# Patient Record
Sex: FEMALE | Race: WHITE | NOT HISPANIC OR LATINO
[De-identification: names, ages, dates, MRNs, and addresses within clinical notes are randomized per-mention and may not be internally consistent; named-entity substitution may affect disease eponyms.]

---

## 2018-12-20 ENCOUNTER — LABORATORY RESULT (OUTPATIENT)
Age: 28
End: 2018-12-20

## 2018-12-27 PROBLEM — E55.9 VITAMIN D INSUFFICIENCY: Status: ACTIVE | Noted: 2018-12-27

## 2018-12-27 PROBLEM — E03.9 SUBCLINICAL HYPOTHYROIDISM: Status: ACTIVE | Noted: 2018-12-27

## 2018-12-28 ENCOUNTER — APPOINTMENT (OUTPATIENT)
Dept: INTERNAL MEDICINE | Facility: CLINIC | Age: 28
End: 2018-12-28
Payer: COMMERCIAL

## 2018-12-28 VITALS
WEIGHT: 191 LBS | SYSTOLIC BLOOD PRESSURE: 110 MMHG | BODY MASS INDEX: 30.7 KG/M2 | OXYGEN SATURATION: 98 % | HEART RATE: 79 BPM | TEMPERATURE: 98.3 F | DIASTOLIC BLOOD PRESSURE: 80 MMHG | HEIGHT: 66.14 IN

## 2018-12-28 DIAGNOSIS — E03.9 HYPOTHYROIDISM, UNSPECIFIED: ICD-10-CM

## 2018-12-28 DIAGNOSIS — E55.9 VITAMIN D DEFICIENCY, UNSPECIFIED: ICD-10-CM

## 2018-12-28 DIAGNOSIS — Z00.00 ENCOUNTER FOR GENERAL ADULT MEDICAL EXAMINATION W/OUT ABNORMAL FINDINGS: ICD-10-CM

## 2018-12-28 DIAGNOSIS — B00.1 HERPESVIRAL VESICULAR DERMATITIS: ICD-10-CM

## 2018-12-28 PROCEDURE — 99385 PREV VISIT NEW AGE 18-39: CPT

## 2018-12-28 PROCEDURE — 99214 OFFICE O/P EST MOD 30 MIN: CPT | Mod: 25

## 2018-12-31 PROBLEM — Z00.00 ENCOUNTER FOR PREVENTIVE HEALTH EXAMINATION: Noted: 2018-10-19

## 2019-01-09 PROBLEM — Z00.00 ENCOUNTER FOR PREVENTIVE HEALTH EXAMINATION: Status: ACTIVE | Noted: 2019-01-09

## 2019-01-22 ENCOUNTER — APPOINTMENT (OUTPATIENT)
Dept: INTERNAL MEDICINE | Facility: CLINIC | Age: 29
End: 2019-01-22

## 2019-08-16 ENCOUNTER — CLINICAL ADVICE (OUTPATIENT)
Age: 29
End: 2019-08-16

## 2019-10-25 ENCOUNTER — RESULT REVIEW (OUTPATIENT)
Age: 29
End: 2019-10-25

## 2019-12-16 ENCOUNTER — MEDICATION RENEWAL (OUTPATIENT)
Age: 29
End: 2019-12-16

## 2019-12-20 ENCOUNTER — MEDICATION RENEWAL (OUTPATIENT)
Age: 29
End: 2019-12-20

## 2020-02-12 ENCOUNTER — TRANSCRIPTION ENCOUNTER (OUTPATIENT)
Age: 30
End: 2020-02-12

## 2020-04-10 ENCOUNTER — APPOINTMENT (OUTPATIENT)
Dept: ANTEPARTUM | Facility: CLINIC | Age: 30
End: 2020-04-10
Payer: COMMERCIAL

## 2020-04-10 ENCOUNTER — ASOB RESULT (OUTPATIENT)
Age: 30
End: 2020-04-10

## 2020-04-10 PROCEDURE — 76811 OB US DETAILED SNGL FETUS: CPT

## 2020-07-07 ENCOUNTER — TRANSCRIPTION ENCOUNTER (OUTPATIENT)
Age: 30
End: 2020-07-07

## 2020-07-08 ENCOUNTER — TRANSCRIPTION ENCOUNTER (OUTPATIENT)
Age: 30
End: 2020-07-08

## 2020-08-23 ENCOUNTER — INPATIENT (INPATIENT)
Facility: HOSPITAL | Age: 30
LOS: 0 days | Discharge: HOME | End: 2020-08-24
Attending: OBSTETRICS & GYNECOLOGY | Admitting: OBSTETRICS & GYNECOLOGY
Payer: COMMERCIAL

## 2020-08-23 VITALS — OXYGEN SATURATION: 99 %

## 2020-08-23 DIAGNOSIS — Z3A.00 WEEKS OF GESTATION OF PREGNANCY NOT SPECIFIED: ICD-10-CM

## 2020-08-23 DIAGNOSIS — O26.899 OTHER SPECIFIED PREGNANCY RELATED CONDITIONS, UNSPECIFIED TRIMESTER: ICD-10-CM

## 2020-08-23 DIAGNOSIS — Z34.80 ENCOUNTER FOR SUPERVISION OF OTHER NORMAL PREGNANCY, UNSPECIFIED TRIMESTER: ICD-10-CM

## 2020-08-23 LAB
BASOPHILS # BLD AUTO: 0.04 K/UL — SIGNIFICANT CHANGE UP (ref 0–0.2)
BASOPHILS NFR BLD AUTO: 0.3 % — SIGNIFICANT CHANGE UP (ref 0–2)
BLD GP AB SCN SERPL QL: NEGATIVE — SIGNIFICANT CHANGE UP
EOSINOPHIL # BLD AUTO: 0.15 K/UL — SIGNIFICANT CHANGE UP (ref 0–0.5)
EOSINOPHIL NFR BLD AUTO: 1.2 % — SIGNIFICANT CHANGE UP (ref 0–6)
HCT VFR BLD CALC: 39.4 % — SIGNIFICANT CHANGE UP (ref 34.5–45)
HGB BLD-MCNC: 13.3 G/DL — SIGNIFICANT CHANGE UP (ref 11.5–15.5)
IMM GRANULOCYTES NFR BLD AUTO: 0.6 % — SIGNIFICANT CHANGE UP (ref 0–1.5)
LYMPHOCYTES # BLD AUTO: 17.6 % — SIGNIFICANT CHANGE UP (ref 13–44)
LYMPHOCYTES # BLD AUTO: 2.14 K/UL — SIGNIFICANT CHANGE UP (ref 1–3.3)
MCHC RBC-ENTMCNC: 30.6 PG — SIGNIFICANT CHANGE UP (ref 27–34)
MCHC RBC-ENTMCNC: 33.8 GM/DL — SIGNIFICANT CHANGE UP (ref 32–36)
MCV RBC AUTO: 90.8 FL — SIGNIFICANT CHANGE UP (ref 80–100)
MONOCYTES # BLD AUTO: 0.74 K/UL — SIGNIFICANT CHANGE UP (ref 0–0.9)
MONOCYTES NFR BLD AUTO: 6.1 % — SIGNIFICANT CHANGE UP (ref 2–14)
NEUTROPHILS # BLD AUTO: 9.04 K/UL — HIGH (ref 1.8–7.4)
NEUTROPHILS NFR BLD AUTO: 74.2 % — SIGNIFICANT CHANGE UP (ref 43–77)
NRBC # BLD: 0 /100 WBCS — SIGNIFICANT CHANGE UP (ref 0–0)
PLATELET # BLD AUTO: 222 K/UL — SIGNIFICANT CHANGE UP (ref 150–400)
RBC # BLD: 4.34 M/UL — SIGNIFICANT CHANGE UP (ref 3.8–5.2)
RBC # FLD: 12.8 % — SIGNIFICANT CHANGE UP (ref 10.3–14.5)
RH IG SCN BLD-IMP: POSITIVE — SIGNIFICANT CHANGE UP
SARS-COV-2 IGG SERPL QL IA: NEGATIVE — SIGNIFICANT CHANGE UP
SARS-COV-2 IGM SERPL IA-ACNC: 0.09 INDEX — SIGNIFICANT CHANGE UP
SARS-COV-2 RNA SPEC QL NAA+PROBE: SIGNIFICANT CHANGE UP
WBC # BLD: 12.18 K/UL — HIGH (ref 3.8–10.5)
WBC # FLD AUTO: 12.18 K/UL — HIGH (ref 3.8–10.5)

## 2020-08-23 RX ORDER — OXYTOCIN 10 UNIT/ML
2 VIAL (ML) INJECTION
Qty: 30 | Refills: 0 | Status: DISCONTINUED | OUTPATIENT
Start: 2020-08-23 | End: 2020-08-24

## 2020-08-23 RX ORDER — SODIUM CHLORIDE 9 MG/ML
1000 INJECTION, SOLUTION INTRAVENOUS
Refills: 0 | Status: DISCONTINUED | OUTPATIENT
Start: 2020-08-23 | End: 2020-08-23

## 2020-08-23 RX ORDER — OXYTOCIN 10 UNIT/ML
10 VIAL (ML) INJECTION ONCE
Refills: 0 | Status: COMPLETED | OUTPATIENT
Start: 2020-08-23 | End: 2020-08-23

## 2020-08-23 RX ORDER — CITRIC ACID/SODIUM CITRATE 300-500 MG
15 SOLUTION, ORAL ORAL EVERY 6 HOURS
Refills: 0 | Status: DISCONTINUED | OUTPATIENT
Start: 2020-08-23 | End: 2020-08-23

## 2020-08-23 RX ORDER — OXYCODONE HYDROCHLORIDE 5 MG/1
5 TABLET ORAL
Refills: 0 | Status: DISCONTINUED | OUTPATIENT
Start: 2020-08-23 | End: 2020-08-24

## 2020-08-23 RX ORDER — IBUPROFEN 200 MG
600 TABLET ORAL EVERY 6 HOURS
Refills: 0 | Status: COMPLETED | OUTPATIENT
Start: 2020-08-23 | End: 2021-07-22

## 2020-08-23 RX ORDER — OXYTOCIN 10 UNIT/ML
333.33 VIAL (ML) INJECTION
Qty: 20 | Refills: 0 | Status: DISCONTINUED | OUTPATIENT
Start: 2020-08-23 | End: 2020-08-24

## 2020-08-23 RX ORDER — SODIUM CHLORIDE 9 MG/ML
1000 INJECTION, SOLUTION INTRAVENOUS
Refills: 0 | Status: DISCONTINUED | OUTPATIENT
Start: 2020-08-23 | End: 2020-08-24

## 2020-08-23 RX ORDER — OXYTOCIN 10 UNIT/ML
2 VIAL (ML) INJECTION
Qty: 30 | Refills: 0 | Status: DISCONTINUED | OUTPATIENT
Start: 2020-08-23 | End: 2020-08-23

## 2020-08-23 RX ORDER — IBUPROFEN 200 MG
600 TABLET ORAL EVERY 6 HOURS
Refills: 0 | Status: DISCONTINUED | OUTPATIENT
Start: 2020-08-23 | End: 2020-08-24

## 2020-08-23 RX ORDER — SODIUM CHLORIDE 9 MG/ML
1000 INJECTION, SOLUTION INTRAVENOUS ONCE
Refills: 0 | Status: COMPLETED | OUTPATIENT
Start: 2020-08-23 | End: 2020-08-23

## 2020-08-23 RX ORDER — ACETAMINOPHEN 500 MG
975 TABLET ORAL
Refills: 0 | Status: DISCONTINUED | OUTPATIENT
Start: 2020-08-23 | End: 2020-08-24

## 2020-08-23 RX ORDER — OXYCODONE HYDROCHLORIDE 5 MG/1
5 TABLET ORAL ONCE
Refills: 0 | Status: DISCONTINUED | OUTPATIENT
Start: 2020-08-23 | End: 2020-08-24

## 2020-08-23 RX ADMIN — Medication 15 MILLILITER(S): at 12:25

## 2020-08-23 RX ADMIN — SODIUM CHLORIDE 125 MILLILITER(S): 9 INJECTION, SOLUTION INTRAVENOUS at 11:49

## 2020-08-23 RX ADMIN — SODIUM CHLORIDE 2000 MILLILITER(S): 9 INJECTION, SOLUTION INTRAVENOUS at 08:19

## 2020-08-23 RX ADMIN — Medication 975 MILLIGRAM(S): at 23:55

## 2020-08-23 RX ADMIN — SODIUM CHLORIDE 125 MILLILITER(S): 9 INJECTION, SOLUTION INTRAVENOUS at 08:19

## 2020-08-23 RX ADMIN — Medication 10 UNIT(S): at 18:01

## 2020-08-23 RX ADMIN — SODIUM CHLORIDE 125 MILLILITER(S): 9 INJECTION, SOLUTION INTRAVENOUS at 08:46

## 2020-08-23 RX ADMIN — Medication 2 MILLIUNIT(S)/MIN: at 11:46

## 2020-08-23 RX ADMIN — Medication 600 MILLIGRAM(S): at 20:05

## 2020-08-24 ENCOUNTER — TRANSCRIPTION ENCOUNTER (OUTPATIENT)
Age: 30
End: 2020-08-24

## 2020-08-24 VITALS
DIASTOLIC BLOOD PRESSURE: 68 MMHG | SYSTOLIC BLOOD PRESSURE: 109 MMHG | TEMPERATURE: 98 F | OXYGEN SATURATION: 66 % | RESPIRATION RATE: 18 BRPM | HEART RATE: 66 BPM

## 2020-08-24 LAB
T PALLIDUM AB TITR SER: NEGATIVE — SIGNIFICANT CHANGE UP

## 2020-08-24 PROCEDURE — 86901 BLOOD TYPING SEROLOGIC RH(D): CPT

## 2020-08-24 PROCEDURE — 59050 FETAL MONITOR W/REPORT: CPT

## 2020-08-24 PROCEDURE — 86769 SARS-COV-2 COVID-19 ANTIBODY: CPT

## 2020-08-24 PROCEDURE — 86900 BLOOD TYPING SEROLOGIC ABO: CPT

## 2020-08-24 PROCEDURE — 86850 RBC ANTIBODY SCREEN: CPT

## 2020-08-24 PROCEDURE — 59025 FETAL NON-STRESS TEST: CPT

## 2020-08-24 PROCEDURE — G0463: CPT

## 2020-08-24 PROCEDURE — 85027 COMPLETE CBC AUTOMATED: CPT

## 2020-08-24 PROCEDURE — 86780 TREPONEMA PALLIDUM: CPT

## 2020-08-24 RX ORDER — OXYTOCIN 10 UNIT/ML
333.33 VIAL (ML) INJECTION
Qty: 20 | Refills: 0 | Status: DISCONTINUED | OUTPATIENT
Start: 2020-08-24 | End: 2020-08-24

## 2020-08-24 RX ORDER — SODIUM CHLORIDE 9 MG/ML
1000 INJECTION, SOLUTION INTRAVENOUS
Refills: 0 | Status: DISCONTINUED | OUTPATIENT
Start: 2020-08-24 | End: 2020-08-24

## 2020-08-24 RX ORDER — SIMETHICONE 80 MG/1
80 TABLET, CHEWABLE ORAL EVERY 4 HOURS
Refills: 0 | Status: DISCONTINUED | OUTPATIENT
Start: 2020-08-24 | End: 2020-08-24

## 2020-08-24 RX ORDER — TETANUS TOXOID, REDUCED DIPHTHERIA TOXOID AND ACELLULAR PERTUSSIS VACCINE, ADSORBED 5; 2.5; 8; 8; 2.5 [IU]/.5ML; [IU]/.5ML; UG/.5ML; UG/.5ML; UG/.5ML
0.5 SUSPENSION INTRAMUSCULAR ONCE
Refills: 0 | Status: DISCONTINUED | OUTPATIENT
Start: 2020-08-24 | End: 2020-08-24

## 2020-08-24 RX ORDER — IBUPROFEN 200 MG
1 TABLET ORAL
Qty: 0 | Refills: 0 | DISCHARGE
Start: 2020-08-24

## 2020-08-24 RX ORDER — LANOLIN
1 OINTMENT (GRAM) TOPICAL EVERY 6 HOURS
Refills: 0 | Status: DISCONTINUED | OUTPATIENT
Start: 2020-08-24 | End: 2020-08-24

## 2020-08-24 RX ORDER — SENNA PLUS 8.6 MG/1
1 TABLET ORAL ONCE
Refills: 0 | Status: COMPLETED | OUTPATIENT
Start: 2020-08-24 | End: 2020-08-24

## 2020-08-24 RX ORDER — ACETAMINOPHEN 500 MG
3 TABLET ORAL
Qty: 0 | Refills: 0 | DISCHARGE
Start: 2020-08-24

## 2020-08-24 RX ORDER — MAGNESIUM HYDROXIDE 400 MG/1
30 TABLET, CHEWABLE ORAL
Refills: 0 | Status: DISCONTINUED | OUTPATIENT
Start: 2020-08-24 | End: 2020-08-24

## 2020-08-24 RX ORDER — DIPHENHYDRAMINE HCL 50 MG
25 CAPSULE ORAL EVERY 6 HOURS
Refills: 0 | Status: DISCONTINUED | OUTPATIENT
Start: 2020-08-24 | End: 2020-08-24

## 2020-08-24 RX ADMIN — Medication 975 MILLIGRAM(S): at 12:29

## 2020-08-24 RX ADMIN — Medication 975 MILLIGRAM(S): at 06:45

## 2020-08-24 RX ADMIN — Medication 975 MILLIGRAM(S): at 13:00

## 2020-08-24 RX ADMIN — Medication 975 MILLIGRAM(S): at 00:30

## 2020-08-24 RX ADMIN — Medication 600 MILLIGRAM(S): at 09:17

## 2020-08-24 RX ADMIN — Medication 600 MILLIGRAM(S): at 09:50

## 2020-08-24 RX ADMIN — Medication 600 MILLIGRAM(S): at 03:55

## 2020-08-24 RX ADMIN — Medication 975 MILLIGRAM(S): at 17:20

## 2020-08-24 RX ADMIN — Medication 975 MILLIGRAM(S): at 06:12

## 2020-08-24 RX ADMIN — Medication 600 MILLIGRAM(S): at 16:00

## 2020-08-24 RX ADMIN — Medication 600 MILLIGRAM(S): at 03:15

## 2020-08-24 RX ADMIN — Medication 600 MILLIGRAM(S): at 15:28

## 2020-08-24 RX ADMIN — SENNA PLUS 1 TABLET(S): 8.6 TABLET ORAL at 17:20

## 2020-08-24 NOTE — DISCHARGE NOTE OB - CARE PLAN
Principal Discharge DX:	 (normal spontaneous vaginal delivery)  Goal:	D/C HOME  Assessment and plan of treatment:	PPV 6 WEEKS

## 2020-08-24 NOTE — DISCHARGE NOTE OB - CARE PROVIDER_API CALL
Lenny Rowan  OBSTETRICS AND GYNECOLOGY  [address], [address]  [place] [address]  Phone: [phone]  Fax: [phone]  Follow Up Time:

## 2020-08-24 NOTE — DISCHARGE NOTE OB - PATIENT PORTAL LINK FT
You can access the FollowMyHealth Patient Portal offered by [facility] by registering at the following website: [email or web address]. By joining [employer]’s FollowMyHealth portal, you will also be able to view your health information using other applications (apps) compatible with our system.

## 2020-08-24 NOTE — PROGRESS NOTE ADULT - SUBJECTIVE AND OBJECTIVE BOX
PA Postpartum Note- PPD#1    Prenatal Labs  Blood type: O Positive  Rubella IgG:   RPR: Negative        S:Patient w/o complaints, pain is controlled.    Pt is OOB, tolerating PO, voiding. Lochia WNL.     O:  Vital Signs Last 24 Hrs  T(C): 36.7 (24 Aug 2020 06:09), Max: 37.1 (23 Aug 2020 20:32)  T(F): 98.1 (24 Aug 2020 06:09), Max: 98.8 (23 Aug 2020 20:32)  HR: 78 (24 Aug 2020 06:09) (72 - 96)  BP: 97/61 (24 Aug 2020 06:09) (97/61 - 118/66)  BP(mean): --  RR: 18 (24 Aug 2020 06:09) (18 - 18)  SpO2: 98% (24 Aug 2020 06:09) (96% - 99%)     Gen: NAD  Abdomen: Soft, nontender, non-distended, fundus firm.  Vaginal: Lochia WNL,    Ext: Neg edema, Neg calf tenderness    LABS:    Hemoglobin: 13.3 g/dL (08-23 @ 09:03)      Hematocrit: 39.4 % (08-23 @ 09:03)

## 2020-09-29 ENCOUNTER — RESULT REVIEW (OUTPATIENT)
Age: 30
End: 2020-09-29

## 2020-10-13 ENCOUNTER — TRANSCRIPTION ENCOUNTER (OUTPATIENT)
Age: 30
End: 2020-10-13

## 2021-04-27 ENCOUNTER — TRANSCRIPTION ENCOUNTER (OUTPATIENT)
Age: 31
End: 2021-04-27

## 2021-06-09 ENCOUNTER — NON-APPOINTMENT (OUTPATIENT)
Age: 31
End: 2021-06-09

## 2021-06-09 ENCOUNTER — APPOINTMENT (OUTPATIENT)
Dept: ORTHOPEDIC SURGERY | Facility: CLINIC | Age: 31
End: 2021-06-09
Payer: COMMERCIAL

## 2021-06-09 VITALS
HEART RATE: 83 BPM | DIASTOLIC BLOOD PRESSURE: 76 MMHG | HEIGHT: 65 IN | WEIGHT: 170 LBS | SYSTOLIC BLOOD PRESSURE: 109 MMHG | BODY MASS INDEX: 28.32 KG/M2

## 2021-06-09 DIAGNOSIS — Z82.49 FAMILY HISTORY OF ISCHEMIC HEART DISEASE AND OTHER DISEASES OF THE CIRCULATORY SYSTEM: ICD-10-CM

## 2021-06-09 DIAGNOSIS — Z83.3 FAMILY HISTORY OF DIABETES MELLITUS: ICD-10-CM

## 2021-06-09 DIAGNOSIS — Z80.9 FAMILY HISTORY OF MALIGNANT NEOPLASM, UNSPECIFIED: ICD-10-CM

## 2021-06-09 DIAGNOSIS — M65.4 RADIAL STYLOID TENOSYNOVITIS [DE QUERVAIN]: ICD-10-CM

## 2021-06-09 DIAGNOSIS — Z72.89 OTHER PROBLEMS RELATED TO LIFESTYLE: ICD-10-CM

## 2021-06-09 DIAGNOSIS — Z78.9 OTHER SPECIFIED HEALTH STATUS: ICD-10-CM

## 2021-06-09 PROCEDURE — 99072 ADDL SUPL MATRL&STAF TM PHE: CPT

## 2021-06-09 PROCEDURE — 73100 X-RAY EXAM OF WRIST: CPT | Mod: 50

## 2021-06-09 PROCEDURE — 99203 OFFICE O/P NEW LOW 30 MIN: CPT

## 2021-06-10 ENCOUNTER — NON-APPOINTMENT (OUTPATIENT)
Age: 31
End: 2021-06-10

## 2021-06-30 ENCOUNTER — APPOINTMENT (OUTPATIENT)
Dept: ORTHOPEDIC SURGERY | Facility: CLINIC | Age: 31
End: 2021-06-30

## 2021-07-24 ENCOUNTER — TRANSCRIPTION ENCOUNTER (OUTPATIENT)
Age: 31
End: 2021-07-24

## 2021-10-28 ENCOUNTER — RESULT REVIEW (OUTPATIENT)
Age: 31
End: 2021-10-28

## 2021-10-29 ENCOUNTER — OUTPATIENT (OUTPATIENT)
Dept: OUTPATIENT SERVICES | Facility: HOSPITAL | Age: 31
LOS: 1 days | End: 2021-10-29

## 2021-10-29 DIAGNOSIS — Z00.8 ENCOUNTER FOR OTHER GENERAL EXAMINATION: ICD-10-CM

## 2021-11-03 ENCOUNTER — APPOINTMENT (OUTPATIENT)
Dept: ULTRASOUND IMAGING | Facility: CLINIC | Age: 31
End: 2021-11-03
Payer: COMMERCIAL

## 2021-11-03 ENCOUNTER — APPOINTMENT (OUTPATIENT)
Dept: MAMMOGRAPHY | Facility: CLINIC | Age: 31
End: 2021-11-03

## 2021-11-03 ENCOUNTER — OUTPATIENT (OUTPATIENT)
Dept: OUTPATIENT SERVICES | Facility: HOSPITAL | Age: 31
LOS: 1 days | End: 2021-11-03
Payer: COMMERCIAL

## 2021-11-03 DIAGNOSIS — Z00.8 ENCOUNTER FOR OTHER GENERAL EXAMINATION: ICD-10-CM

## 2021-11-03 PROCEDURE — 77066 DX MAMMO INCL CAD BI: CPT

## 2021-11-03 PROCEDURE — 76641 ULTRASOUND BREAST COMPLETE: CPT

## 2021-11-03 PROCEDURE — 76641 ULTRASOUND BREAST COMPLETE: CPT | Mod: 26,50

## 2021-11-03 PROCEDURE — G0279: CPT | Mod: 26

## 2021-11-03 PROCEDURE — G0279: CPT

## 2021-11-03 PROCEDURE — 77066 DX MAMMO INCL CAD BI: CPT | Mod: 26

## 2021-11-21 ENCOUNTER — TRANSCRIPTION ENCOUNTER (OUTPATIENT)
Age: 31
End: 2021-11-21

## 2021-12-07 ENCOUNTER — NON-APPOINTMENT (OUTPATIENT)
Age: 31
End: 2021-12-07

## 2021-12-08 ENCOUNTER — NON-APPOINTMENT (OUTPATIENT)
Age: 31
End: 2021-12-08

## 2021-12-08 ENCOUNTER — APPOINTMENT (OUTPATIENT)
Dept: SURGICAL ONCOLOGY | Facility: CLINIC | Age: 31
End: 2021-12-08
Payer: COMMERCIAL

## 2021-12-08 VITALS
SYSTOLIC BLOOD PRESSURE: 115 MMHG | WEIGHT: 170 LBS | DIASTOLIC BLOOD PRESSURE: 80 MMHG | HEIGHT: 65 IN | OXYGEN SATURATION: 98 % | HEART RATE: 66 BPM | TEMPERATURE: 97 F | BODY MASS INDEX: 28.32 KG/M2 | RESPIRATION RATE: 15 BRPM

## 2021-12-08 PROCEDURE — 99204 OFFICE O/P NEW MOD 45 MIN: CPT

## 2022-01-19 ENCOUNTER — APPOINTMENT (OUTPATIENT)
Dept: SURGICAL ONCOLOGY | Facility: CLINIC | Age: 32
End: 2022-01-19

## 2022-05-13 ENCOUNTER — APPOINTMENT (OUTPATIENT)
Dept: ULTRASOUND IMAGING | Facility: CLINIC | Age: 32
End: 2022-05-13
Payer: COMMERCIAL

## 2022-05-13 ENCOUNTER — OUTPATIENT (OUTPATIENT)
Dept: OUTPATIENT SERVICES | Facility: HOSPITAL | Age: 32
LOS: 1 days | End: 2022-05-13
Payer: COMMERCIAL

## 2022-05-13 DIAGNOSIS — Z00.8 ENCOUNTER FOR OTHER GENERAL EXAMINATION: ICD-10-CM

## 2022-05-13 PROCEDURE — 76641 ULTRASOUND BREAST COMPLETE: CPT | Mod: 26,50

## 2022-05-13 PROCEDURE — 76641 ULTRASOUND BREAST COMPLETE: CPT

## 2022-05-14 ENCOUNTER — RESULT REVIEW (OUTPATIENT)
Age: 32
End: 2022-05-14

## 2022-05-14 ENCOUNTER — APPOINTMENT (OUTPATIENT)
Dept: ULTRASOUND IMAGING | Facility: CLINIC | Age: 32
End: 2022-05-14
Payer: COMMERCIAL

## 2022-05-14 ENCOUNTER — OUTPATIENT (OUTPATIENT)
Dept: OUTPATIENT SERVICES | Facility: HOSPITAL | Age: 32
LOS: 1 days | End: 2022-05-14
Payer: COMMERCIAL

## 2022-05-14 DIAGNOSIS — Z00.8 ENCOUNTER FOR OTHER GENERAL EXAMINATION: ICD-10-CM

## 2022-05-14 DIAGNOSIS — R92.2 INCONCLUSIVE MAMMOGRAM: ICD-10-CM

## 2022-05-14 PROCEDURE — 88360 TUMOR IMMUNOHISTOCHEM/MANUAL: CPT | Mod: 26

## 2022-05-14 PROCEDURE — 19084 BX BREAST ADD LESION US IMAG: CPT | Mod: RT

## 2022-05-14 PROCEDURE — 76942 ECHO GUIDE FOR BIOPSY: CPT | Mod: 26

## 2022-05-14 PROCEDURE — 88305 TISSUE EXAM BY PATHOLOGIST: CPT

## 2022-05-14 PROCEDURE — 38505 NEEDLE BIOPSY LYMPH NODES: CPT | Mod: RT

## 2022-05-14 PROCEDURE — 19083 BX BREAST 1ST LESION US IMAG: CPT | Mod: RT

## 2022-05-14 PROCEDURE — 88305 TISSUE EXAM BY PATHOLOGIST: CPT | Mod: 26

## 2022-05-14 PROCEDURE — A4648: CPT

## 2022-05-14 PROCEDURE — 88360 TUMOR IMMUNOHISTOCHEM/MANUAL: CPT

## 2022-05-14 PROCEDURE — 38505 NEEDLE BIOPSY LYMPH NODES: CPT

## 2022-05-14 PROCEDURE — 19084 BX BREAST ADD LESION US IMAG: CPT

## 2022-05-14 PROCEDURE — 76942 ECHO GUIDE FOR BIOPSY: CPT

## 2022-05-14 PROCEDURE — 19083 BX BREAST 1ST LESION US IMAG: CPT

## 2022-05-18 ENCOUNTER — APPOINTMENT (OUTPATIENT)
Dept: SURGICAL ONCOLOGY | Facility: CLINIC | Age: 32
End: 2022-05-18
Payer: COMMERCIAL

## 2022-05-18 VITALS
DIASTOLIC BLOOD PRESSURE: 77 MMHG | HEIGHT: 65 IN | BODY MASS INDEX: 31.65 KG/M2 | RESPIRATION RATE: 16 BRPM | HEART RATE: 90 BPM | TEMPERATURE: 97.7 F | SYSTOLIC BLOOD PRESSURE: 114 MMHG | OXYGEN SATURATION: 100 % | WEIGHT: 190 LBS

## 2022-05-18 PROCEDURE — 99215 OFFICE O/P EST HI 40 MIN: CPT

## 2022-05-19 ENCOUNTER — APPOINTMENT (OUTPATIENT)
Dept: ULTRASOUND IMAGING | Facility: CLINIC | Age: 32
End: 2022-05-19
Payer: COMMERCIAL

## 2022-05-19 ENCOUNTER — APPOINTMENT (OUTPATIENT)
Dept: RADIOLOGY | Facility: CLINIC | Age: 32
End: 2022-05-19
Payer: COMMERCIAL

## 2022-05-19 PROCEDURE — 71046 X-RAY EXAM CHEST 2 VIEWS: CPT

## 2022-05-19 PROCEDURE — 76700 US EXAM ABDOM COMPLETE: CPT

## 2022-05-23 ENCOUNTER — OUTPATIENT (OUTPATIENT)
Dept: OUTPATIENT SERVICES | Facility: HOSPITAL | Age: 32
LOS: 1 days | Discharge: HOME | End: 2022-05-23

## 2022-05-23 DIAGNOSIS — C50.919 MALIGNANT NEOPLASM OF UNSPECIFIED SITE OF UNSPECIFIED FEMALE BREAST: ICD-10-CM

## 2022-05-24 ENCOUNTER — NON-APPOINTMENT (OUTPATIENT)
Age: 32
End: 2022-05-24

## 2022-05-24 RX ORDER — NORETHINDRONE ACETATE AND ETHINYL ESTRADIOL AND FERROUS FUMARATE 1.5-30(21)
1.5-3 KIT ORAL DAILY
Qty: 9 | Refills: 3 | Status: DISCONTINUED | COMMUNITY
Start: 2018-12-28 | End: 2022-05-24

## 2022-05-25 ENCOUNTER — NON-APPOINTMENT (OUTPATIENT)
Age: 32
End: 2022-05-25

## 2022-05-25 ENCOUNTER — APPOINTMENT (OUTPATIENT)
Dept: HEMATOLOGY ONCOLOGY | Facility: CLINIC | Age: 32
End: 2022-05-25
Payer: COMMERCIAL

## 2022-05-25 VITALS
HEART RATE: 65 BPM | HEIGHT: 65 IN | RESPIRATION RATE: 14 BRPM | OXYGEN SATURATION: 98 % | DIASTOLIC BLOOD PRESSURE: 74 MMHG | BODY MASS INDEX: 33.79 KG/M2 | SYSTOLIC BLOOD PRESSURE: 115 MMHG | WEIGHT: 202.83 LBS | TEMPERATURE: 97.2 F

## 2022-05-25 PROCEDURE — 99205 OFFICE O/P NEW HI 60 MIN: CPT

## 2022-05-26 ENCOUNTER — APPOINTMENT (OUTPATIENT)
Dept: PLASTIC SURGERY | Facility: CLINIC | Age: 32
End: 2022-05-26
Payer: COMMERCIAL

## 2022-05-26 ENCOUNTER — NON-APPOINTMENT (OUTPATIENT)
Age: 32
End: 2022-05-26

## 2022-05-26 VITALS
OXYGEN SATURATION: 97 % | HEIGHT: 65 IN | BODY MASS INDEX: 31.65 KG/M2 | HEART RATE: 87 BPM | DIASTOLIC BLOOD PRESSURE: 77 MMHG | SYSTOLIC BLOOD PRESSURE: 114 MMHG | WEIGHT: 190 LBS | TEMPERATURE: 98.5 F

## 2022-05-26 PROCEDURE — 99204 OFFICE O/P NEW MOD 45 MIN: CPT

## 2022-06-01 ENCOUNTER — ASOB RESULT (OUTPATIENT)
Age: 32
End: 2022-06-01

## 2022-06-01 ENCOUNTER — APPOINTMENT (OUTPATIENT)
Dept: MATERNAL FETAL MEDICINE | Facility: CLINIC | Age: 32
End: 2022-06-01
Payer: COMMERCIAL

## 2022-06-01 VITALS
BODY MASS INDEX: 33.99 KG/M2 | DIASTOLIC BLOOD PRESSURE: 76 MMHG | WEIGHT: 204 LBS | HEIGHT: 65 IN | HEART RATE: 76 BPM | SYSTOLIC BLOOD PRESSURE: 111 MMHG | OXYGEN SATURATION: 98 %

## 2022-06-01 PROCEDURE — 99205 OFFICE O/P NEW HI 60 MIN: CPT

## 2022-06-03 ENCOUNTER — NON-APPOINTMENT (OUTPATIENT)
Age: 32
End: 2022-06-03

## 2022-06-06 ENCOUNTER — OUTPATIENT (OUTPATIENT)
Dept: OUTPATIENT SERVICES | Facility: HOSPITAL | Age: 32
LOS: 1 days | End: 2022-06-06

## 2022-06-06 VITALS
OXYGEN SATURATION: 99 % | HEIGHT: 65 IN | RESPIRATION RATE: 16 BRPM | HEART RATE: 85 BPM | WEIGHT: 201.94 LBS | TEMPERATURE: 98 F | DIASTOLIC BLOOD PRESSURE: 75 MMHG | SYSTOLIC BLOOD PRESSURE: 112 MMHG

## 2022-06-06 DIAGNOSIS — Z90.89 ACQUIRED ABSENCE OF OTHER ORGANS: Chronic | ICD-10-CM

## 2022-06-06 DIAGNOSIS — C50.911 MALIGNANT NEOPLASM OF UNSPECIFIED SITE OF RIGHT FEMALE BREAST: ICD-10-CM

## 2022-06-06 LAB
ANION GAP SERPL CALC-SCNC: 16 MMOL/L — HIGH (ref 7–14)
BUN SERPL-MCNC: 6 MG/DL — LOW (ref 7–23)
CALCIUM SERPL-MCNC: 9.4 MG/DL — SIGNIFICANT CHANGE UP (ref 8.4–10.5)
CHLORIDE SERPL-SCNC: 98 MMOL/L — SIGNIFICANT CHANGE UP (ref 98–107)
CO2 SERPL-SCNC: 22 MMOL/L — SIGNIFICANT CHANGE UP (ref 22–31)
CREAT SERPL-MCNC: 0.45 MG/DL — LOW (ref 0.5–1.3)
EGFR: 130 ML/MIN/1.73M2 — SIGNIFICANT CHANGE UP
GLUCOSE SERPL-MCNC: 90 MG/DL — SIGNIFICANT CHANGE UP (ref 70–99)
HCT VFR BLD CALC: 38.4 % — SIGNIFICANT CHANGE UP (ref 34.5–45)
HGB BLD-MCNC: 13.2 G/DL — SIGNIFICANT CHANGE UP (ref 11.5–15.5)
MCHC RBC-ENTMCNC: 30.9 PG — SIGNIFICANT CHANGE UP (ref 27–34)
MCHC RBC-ENTMCNC: 34.4 GM/DL — SIGNIFICANT CHANGE UP (ref 32–36)
MCV RBC AUTO: 89.9 FL — SIGNIFICANT CHANGE UP (ref 80–100)
NRBC # BLD: 0 /100 WBCS — SIGNIFICANT CHANGE UP
NRBC # FLD: 0 K/UL — SIGNIFICANT CHANGE UP
PLATELET # BLD AUTO: 336 K/UL — SIGNIFICANT CHANGE UP (ref 150–400)
POTASSIUM SERPL-MCNC: 3.7 MMOL/L — SIGNIFICANT CHANGE UP (ref 3.5–5.3)
POTASSIUM SERPL-SCNC: 3.7 MMOL/L — SIGNIFICANT CHANGE UP (ref 3.5–5.3)
RBC # BLD: 4.27 M/UL — SIGNIFICANT CHANGE UP (ref 3.8–5.2)
RBC # FLD: 12.1 % — SIGNIFICANT CHANGE UP (ref 10.3–14.5)
SODIUM SERPL-SCNC: 136 MMOL/L — SIGNIFICANT CHANGE UP (ref 135–145)
WBC # BLD: 12 K/UL — HIGH (ref 3.8–10.5)
WBC # FLD AUTO: 12 K/UL — HIGH (ref 3.8–10.5)

## 2022-06-06 RX ORDER — SODIUM CHLORIDE 9 MG/ML
1000 INJECTION, SOLUTION INTRAVENOUS
Refills: 0 | Status: DISCONTINUED | OUTPATIENT
Start: 2022-06-21 | End: 2022-06-22

## 2022-06-06 NOTE — H&P PST ADULT - HISTORY OF PRESENT ILLNESS
32 yo female with preop dx. malignant neoplasm unspecified site right female breast presents to pre surgical testing.  Pt is currently pregnant 11 weeks, ANDI 12/27/22.  Pt reports she experienced right breast pain 3 weeks ago, s/p US biopsy, revealing right breast malignancy.  Pt is scheduled for right modified radical mastectomy magseed localization with reconstruction.   32 yo female with preop dx. malignant neoplasm unspecified site right female breast presents to pre surgical testing.  Pt is currently pregnant 11 weeks, ANDI 12/27/22.  Pt reports she experienced right breast pain 3 weeks ago, s/p US biopsy, revealing right breast malignancy.  Pt is scheduled for right modified radical mastectomy magseed localization with reconstruction(pt states she is having tissue expander placement).

## 2022-06-06 NOTE — H&P PST ADULT - PROBLEM SELECTOR PLAN 1
Pt is scheduled for right modified radical mastectomy magseed localization with reconstruction on 6/21/22.  Verbal and written pre op instructions reviewed with patient and pt able to verbalize understanding.   Verbal and written instructions given with chlorhexidine wash, pt able to verbalize understanding via teach back method.  Pt instructed to follow surgeon's guidelines regarding COVID testing preop. Pt is scheduled for right modified radical mastectomy magseed localization with reconstruction on 6/21/22.  Verbal and written pre op instructions reviewed with patient and pt able to verbalize understanding.   Verbal and written instructions given with chlorhexidine wash, pt able to verbalize understanding via teach back method.  Pt instructed to follow surgeon's guidelines regarding COVID testing preop.  OR management team notified of pt's pregnancy status.

## 2022-06-13 ENCOUNTER — NON-APPOINTMENT (OUTPATIENT)
Age: 32
End: 2022-06-13

## 2022-06-14 ENCOUNTER — OUTPATIENT (OUTPATIENT)
Dept: OUTPATIENT SERVICES | Facility: HOSPITAL | Age: 32
LOS: 1 days | End: 2022-06-14
Payer: COMMERCIAL

## 2022-06-14 ENCOUNTER — ASOB RESULT (OUTPATIENT)
Age: 32
End: 2022-06-14

## 2022-06-14 ENCOUNTER — RESULT REVIEW (OUTPATIENT)
Age: 32
End: 2022-06-14

## 2022-06-14 ENCOUNTER — APPOINTMENT (OUTPATIENT)
Dept: ANTEPARTUM | Facility: CLINIC | Age: 32
End: 2022-06-14
Payer: COMMERCIAL

## 2022-06-14 DIAGNOSIS — Z90.89 ACQUIRED ABSENCE OF OTHER ORGANS: Chronic | ICD-10-CM

## 2022-06-14 DIAGNOSIS — Z00.8 ENCOUNTER FOR OTHER GENERAL EXAMINATION: ICD-10-CM

## 2022-06-14 PROCEDURE — 76882 US LMTD JT/FCL EVL NVASC XTR: CPT | Mod: 26,RT

## 2022-06-14 PROCEDURE — 76813 OB US NUCHAL MEAS 1 GEST: CPT

## 2022-06-14 PROCEDURE — 99212 OFFICE O/P EST SF 10 MIN: CPT | Mod: 25

## 2022-06-14 PROCEDURE — 76801 OB US < 14 WKS SINGLE FETUS: CPT

## 2022-06-14 PROCEDURE — 76882 US LMTD JT/FCL EVL NVASC XTR: CPT

## 2022-06-17 ENCOUNTER — RESULT REVIEW (OUTPATIENT)
Age: 32
End: 2022-06-17

## 2022-06-17 ENCOUNTER — APPOINTMENT (OUTPATIENT)
Dept: ULTRASOUND IMAGING | Facility: IMAGING CENTER | Age: 32
End: 2022-06-17
Payer: COMMERCIAL

## 2022-06-17 ENCOUNTER — OUTPATIENT (OUTPATIENT)
Dept: OUTPATIENT SERVICES | Facility: HOSPITAL | Age: 32
LOS: 1 days | End: 2022-06-17
Payer: COMMERCIAL

## 2022-06-17 DIAGNOSIS — Z90.89 ACQUIRED ABSENCE OF OTHER ORGANS: Chronic | ICD-10-CM

## 2022-06-17 DIAGNOSIS — Z00.8 ENCOUNTER FOR OTHER GENERAL EXAMINATION: ICD-10-CM

## 2022-06-17 PROCEDURE — 19285 PERQ DEV BREAST 1ST US IMAG: CPT | Mod: RT

## 2022-06-17 PROCEDURE — 19286 PERQ DEV BREAST ADD US IMAG: CPT | Mod: RT

## 2022-06-17 PROCEDURE — C1739: CPT

## 2022-06-17 PROCEDURE — 19286 PERQ DEV BREAST ADD US IMAG: CPT

## 2022-06-17 PROCEDURE — 19285 PERQ DEV BREAST 1ST US IMAG: CPT

## 2022-06-20 ENCOUNTER — TRANSCRIPTION ENCOUNTER (OUTPATIENT)
Age: 32
End: 2022-06-20

## 2022-06-20 NOTE — ASU PATIENT PROFILE, ADULT - FALL HARM RISK - UNIVERSAL INTERVENTIONS
Bed in lowest position, wheels locked, appropriate side rails in place/Call bell, personal items and telephone in reach/Instruct patient to call for assistance before getting out of bed or chair/Non-slip footwear when patient is out of bed/[place] to call system/Physically safe environment - no spills, clutter or unnecessary equipment/Purposeful Proactive Rounding/Room/bathroom lighting operational, light cord in reach

## 2022-06-21 ENCOUNTER — NON-APPOINTMENT (OUTPATIENT)
Age: 32
End: 2022-06-21

## 2022-06-21 ENCOUNTER — INPATIENT (INPATIENT)
Facility: HOSPITAL | Age: 32
LOS: 0 days | Discharge: HOME | End: 2022-06-22
Attending: SPECIALIST | Admitting: SPECIALIST
Payer: COMMERCIAL

## 2022-06-21 ENCOUNTER — RESULT REVIEW (OUTPATIENT)
Age: 32
End: 2022-06-21

## 2022-06-21 ENCOUNTER — APPOINTMENT (OUTPATIENT)
Dept: SURGICAL ONCOLOGY | Facility: HOSPITAL | Age: 32
End: 2022-06-21

## 2022-06-21 ENCOUNTER — APPOINTMENT (OUTPATIENT)
Dept: PLASTIC SURGERY | Facility: HOSPITAL | Age: 32
End: 2022-06-21

## 2022-06-21 VITALS
SYSTOLIC BLOOD PRESSURE: 106 MMHG | HEART RATE: 81 BPM | RESPIRATION RATE: 18 BRPM | HEIGHT: 65 IN | WEIGHT: 201.94 LBS | DIASTOLIC BLOOD PRESSURE: 61 MMHG | OXYGEN SATURATION: 98 % | TEMPERATURE: 99 F

## 2022-06-21 DIAGNOSIS — Z90.89 ACQUIRED ABSENCE OF OTHER ORGANS: Chronic | ICD-10-CM

## 2022-06-21 DIAGNOSIS — C50.911 MALIGNANT NEOPLASM OF UNSPECIFIED SITE OF RIGHT FEMALE BREAST: ICD-10-CM

## 2022-06-21 PROCEDURE — 19357 TISS XPNDR PLMT BRST RCNSTJ: CPT | Mod: RT

## 2022-06-21 PROCEDURE — 76098 X-RAY EXAM SURGICAL SPECIMEN: CPT | Mod: 26

## 2022-06-21 PROCEDURE — 13102 CMPLX RPR TRUNK ADDL 5CM/<: CPT

## 2022-06-21 PROCEDURE — 88309 TISSUE EXAM BY PATHOLOGIST: CPT | Mod: 26

## 2022-06-21 PROCEDURE — 88307 TISSUE EXAM BY PATHOLOGIST: CPT | Mod: 26

## 2022-06-21 PROCEDURE — 19307 MAST MOD RAD: CPT

## 2022-06-21 PROCEDURE — 13101 CMPLX RPR TRUNK 2.6-7.5 CM: CPT | Mod: 59

## 2022-06-21 PROCEDURE — 88360 TUMOR IMMUNOHISTOCHEM/MANUAL: CPT | Mod: 26

## 2022-06-21 DEVICE — LIGATING CLIPS WECK HORIZON SMALL-WIDE (RED) 24: Type: IMPLANTABLE DEVICE | Status: FUNCTIONAL

## 2022-06-21 DEVICE — IMPLANTABLE DEVICE: Type: IMPLANTABLE DEVICE | Status: FUNCTIONAL

## 2022-06-21 DEVICE — LIGATING CLIPS WECK HORIZON MEDIUM (BLUE) 24: Type: IMPLANTABLE DEVICE | Status: FUNCTIONAL

## 2022-06-21 RX ORDER — CEFAZOLIN SODIUM 1 G
1000 VIAL (EA) INJECTION EVERY 8 HOURS
Refills: 0 | Status: DISCONTINUED | OUTPATIENT
Start: 2022-06-21 | End: 2022-06-22

## 2022-06-21 RX ORDER — ONDANSETRON 8 MG/1
4 TABLET, FILM COATED ORAL ONCE
Refills: 0 | Status: DISCONTINUED | OUTPATIENT
Start: 2022-06-21 | End: 2022-06-21

## 2022-06-21 RX ORDER — OXYCODONE HYDROCHLORIDE 5 MG/1
10 TABLET ORAL EVERY 4 HOURS
Refills: 0 | Status: DISCONTINUED | OUTPATIENT
Start: 2022-06-21 | End: 2022-06-22

## 2022-06-21 RX ORDER — OXYCODONE HYDROCHLORIDE 5 MG/1
5 TABLET ORAL EVERY 4 HOURS
Refills: 0 | Status: DISCONTINUED | OUTPATIENT
Start: 2022-06-21 | End: 2022-06-22

## 2022-06-21 RX ORDER — HEPARIN SODIUM 5000 [USP'U]/ML
5000 INJECTION INTRAVENOUS; SUBCUTANEOUS EVERY 8 HOURS
Refills: 0 | Status: DISCONTINUED | OUTPATIENT
Start: 2022-06-21 | End: 2022-06-21

## 2022-06-21 RX ORDER — ACETAMINOPHEN 500 MG
1000 TABLET ORAL EVERY 6 HOURS
Refills: 0 | Status: DISCONTINUED | OUTPATIENT
Start: 2022-06-21 | End: 2022-06-22

## 2022-06-21 RX ORDER — FENTANYL CITRATE 50 UG/ML
50 INJECTION INTRAVENOUS
Refills: 0 | Status: DISCONTINUED | OUTPATIENT
Start: 2022-06-21 | End: 2022-06-21

## 2022-06-21 RX ORDER — CEFAZOLIN SODIUM 1 G
1000 VIAL (EA) INJECTION EVERY 8 HOURS
Refills: 0 | Status: DISCONTINUED | OUTPATIENT
Start: 2022-06-21 | End: 2022-06-21

## 2022-06-21 RX ORDER — ACETAMINOPHEN 500 MG
915 TABLET ORAL EVERY 6 HOURS
Refills: 0 | Status: DISCONTINUED | OUTPATIENT
Start: 2022-06-21 | End: 2022-06-21

## 2022-06-21 RX ORDER — ACETAMINOPHEN 500 MG
975 TABLET ORAL EVERY 6 HOURS
Refills: 0 | Status: DISCONTINUED | OUTPATIENT
Start: 2022-06-21 | End: 2022-06-21

## 2022-06-21 RX ORDER — OXYCODONE HYDROCHLORIDE 5 MG/1
5 TABLET ORAL ONCE
Refills: 0 | Status: DISCONTINUED | OUTPATIENT
Start: 2022-06-21 | End: 2022-06-22

## 2022-06-21 RX ORDER — ACETAMINOPHEN 500 MG
1000 TABLET ORAL ONCE
Refills: 0 | Status: COMPLETED | OUTPATIENT
Start: 2022-06-21 | End: 2022-06-21

## 2022-06-21 RX ORDER — HEPARIN SODIUM 5000 [USP'U]/ML
5000 INJECTION INTRAVENOUS; SUBCUTANEOUS EVERY 8 HOURS
Refills: 0 | Status: DISCONTINUED | OUTPATIENT
Start: 2022-06-21 | End: 2022-06-22

## 2022-06-21 RX ADMIN — Medication 100 MILLIGRAM(S): at 21:47

## 2022-06-21 RX ADMIN — Medication 1000 MILLIGRAM(S): at 15:00

## 2022-06-21 RX ADMIN — FENTANYL CITRATE 50 MICROGRAM(S): 50 INJECTION INTRAVENOUS at 13:26

## 2022-06-21 RX ADMIN — Medication 400 MILLIGRAM(S): at 20:14

## 2022-06-21 RX ADMIN — FENTANYL CITRATE 50 MICROGRAM(S): 50 INJECTION INTRAVENOUS at 13:04

## 2022-06-21 RX ADMIN — HEPARIN SODIUM 5000 UNIT(S): 5000 INJECTION INTRAVENOUS; SUBCUTANEOUS at 21:47

## 2022-06-21 RX ADMIN — Medication 400 MILLIGRAM(S): at 14:30

## 2022-06-21 RX ADMIN — OXYCODONE HYDROCHLORIDE 5 MILLIGRAM(S): 5 TABLET ORAL at 16:04

## 2022-06-21 NOTE — CHART NOTE - NSCHARTNOTEFT_GEN_A_CORE
Post Operative Note  Patient: TAMERA RUTLEDGE 31y (1990) Female   MRN: [id number]  Location: [place] [address] A  Visit: 06-21-22 Inpatient  Date: 06-21-22 @ 17:27    Procedure: S/P right modified mastectomy, multple positive lymph nodes, s/p axillary LN dissection, tissue exander filled w/ air, mary x2.     Subjective: Patient seen and examined post operatively. Reports pain as controlled. Denies nausea, vomiting, fever, chills, chest pain, SOB, cough.      Objective:  Vitals: T(F): 99.7 (06-21-22 @ 17:24), Max: 99.7 (06-21-22 @ 17:24)  HR: 52 (06-21-22 @ 17:24)  BP: 101/63 (06-21-22 @ 17:24) (90/76 - 114/72)  RR: 18 (06-21-22 @ 17:24)  SpO2: 98% (06-21-22 @ 17:24)  Vent Settings:     In:   06-21-22 @ 07:01  -  06-21-22 @ 17:27  --------------------------------------------------------  IN: 320 mL      IV Fluids: lactated ringers. 1000 milliLiter(s) (30 mL/Hr) IV Continuous <Continuous>      Out:   06-21-22 @ 07:01  -  06-21-22 @ 17:27  --------------------------------------------------------  OUT: 105 mL      EBL:     Voided Urine:   06-21-22 @ 07:01  -  06-21-22 @ 17:27  --------------------------------------------------------  OUT: 105 mL      06-21-22 @ 07:01  -  06-21-22 @ 17:27  --------------------------------------------------------  OUT: 104 mL        Physical Examination:  General: NAD, resting comfortably in bed  HEENT: Normocephalic atraumatic  CW: R breast with tissue expander, no ST swelling or ecchymosis, incision c/d/i, mary drain x2 with s/s output.  Respiratory: Nonlabored respirations, normal CW expansion.  Cardio: S1S2, regular rate and rhythm.  Abdomen: soft, nontender, nondistended.  Vascular: extremities are warm and well perfused.     Imaging:  No post-op imaging studies    Assessment:  31yFemale 13w4d GA patient S/P right modified mastectomy, multple positive lymph nodes, s/p axillary LN dissection, tissue exander filled w/ air, mary x2.     Plan:  - Pain control PRN  - Diet: regular   - Activity: oob/ambulate as tolerated  - DVT ppx: SCD's &     Date/Time: 06-21-22 @ 17:27 Post Operative Note  Patient: TAMERA RUTLEDGE 31y (1990) Female   MRN: [id number]  Location: [place] [address] A  Visit: 06-21-22 Inpatient  Date: 06-21-22 @ 17:27    Procedure: S/P right modified mastectomy, multple positive lymph nodes, s/p axillary LN dissection, tissue exander filled w/ air, mary x2.     Subjective: Patient seen and examined post operatively. Reports pain as controlled. Denies nausea, vomiting, fever, chills, chest pain, SOB, cough.      Objective:  Vitals: T(F): 99.7 (06-21-22 @ 17:24), Max: 99.7 (06-21-22 @ 17:24)  HR: 52 (06-21-22 @ 17:24)  BP: 101/63 (06-21-22 @ 17:24) (90/76 - 114/72)  RR: 18 (06-21-22 @ 17:24)  SpO2: 98% (06-21-22 @ 17:24)  Vent Settings:     In:   06-21-22 @ 07:01  -  06-21-22 @ 17:27  --------------------------------------------------------  IN: 320 mL      IV Fluids: lactated ringers. 1000 milliLiter(s) (30 mL/Hr) IV Continuous <Continuous>      Out:   06-21-22 @ 07:01  -  06-21-22 @ 17:27  --------------------------------------------------------  OUT: 105 mL      EBL:     Voided Urine:   06-21-22 @ 07:01  -  06-21-22 @ 17:27  --------------------------------------------------------  OUT: 105 mL      06-21-22 @ 07:01  -  06-21-22 @ 17:27  --------------------------------------------------------  OUT: 104 mL        Physical Examination:  General: NAD, resting comfortably in bed  HEENT: Normocephalic atraumatic  CW: R breast with tissue expander, axillary fullness, soft, ecchymosis, incision c/d/i, mary drain x2 with s/s output.  Respiratory: Nonlabored respirations, normal CW expansion  Vascular: extremities are warm and well perfused.     Imaging:  No post-op imaging studies    Assessment:  31yFemale 13w4d GA patient S/P right modified mastectomy, multiple positive lymph nodes, s/p axillary LN dissection, tissue expander filled w/ air, mary x2.     Plan:  - Pain control PRN  - Diet: regular   - Activity: oob/ambulate as tolerated  - DVT ppx: SCD's &     Date/Time: 06-21-22 @ 17:27

## 2022-06-21 NOTE — BRIEF OPERATIVE NOTE - OPERATION/FINDINGS
s/p R mastectomy and axillary dissection with surg onc, insertion of R TE.
Right sided radical mastectomy with axillary lymph node dissection. Specimen marked and sent to pathology. Tissue expander filled with air and sutured in place. Skin closed in multilayered fashion without tension. . JPx2

## 2022-06-21 NOTE — PATIENT PROFILE ADULT - FALL HARM RISK - UNIVERSAL INTERVENTIONS
Bed in lowest position, wheels locked, appropriate side rails in place/Call bell, personal items and telephone in reach/Instruct patient to call for assistance before getting out of bed or chair/Non-slip footwear when patient is out of bed/[place] to call system/Physically safe environment - no spills, clutter or unnecessary equipment/Purposeful Proactive Rounding/Room/bathroom lighting operational, light cord in reach

## 2022-06-21 NOTE — CHART NOTE - NSCHARTNOTEFT_GEN_A_CORE
Gabriella Lui is a 42yo @ 13w4d GA presenting for her scheduled right mastectomy. FH check performed preop - 158bpm.       Zo Reyes MD  OBGYN PGY4

## 2022-06-21 NOTE — BRIEF OPERATIVE NOTE - NSICDXBRIEFPREOP_GEN_ALL_CORE_FT
PRE-OP DIAGNOSIS:  Malignant neoplasm of unspecified site of right female breast 21-Jun-2022 12:46:09  Karis Davison  
PRE-OP DIAGNOSIS:  Malignant neoplasm of unspecified site of right female breast 21-Jun-2022 12:46:09  Karis Davison

## 2022-06-21 NOTE — CHART NOTE - NSCHARTNOTEFT_GEN_A_CORE
Patient seen and evaluated for postoperative FHR check s/p R mastectomy, axillary dissection, R breast tissue expander today. FHR 150bpm, gross fetal movement confirmed on bedside transabdominal ultrasound. No concerns. Patient instructed to f/u with her private OBGYN as scheduled.    Yuko Aguilar PGY3

## 2022-06-21 NOTE — BRIEF OPERATIVE NOTE - NSICDXBRIEFPROCEDURE_GEN_ALL_CORE_FT
PROCEDURES:  Insertion of tissue expander into right breast 21-Jun-2022 12:46:44  Karis Davison  
PROCEDURES:  Modified radical mastectomy of right breast 21-Jun-2022 12:58:17  Willian Chairez

## 2022-06-22 ENCOUNTER — TRANSCRIPTION ENCOUNTER (OUTPATIENT)
Age: 32
End: 2022-06-22

## 2022-06-22 VITALS
HEART RATE: 97 BPM | TEMPERATURE: 98 F | RESPIRATION RATE: 18 BRPM | OXYGEN SATURATION: 97 % | SYSTOLIC BLOOD PRESSURE: 106 MMHG | DIASTOLIC BLOOD PRESSURE: 57 MMHG

## 2022-06-22 LAB
ANION GAP SERPL CALC-SCNC: 11 MMOL/L — SIGNIFICANT CHANGE UP (ref 7–14)
BUN SERPL-MCNC: 3 MG/DL — LOW (ref 7–23)
CALCIUM SERPL-MCNC: 9 MG/DL — SIGNIFICANT CHANGE UP (ref 8.4–10.5)
CHLORIDE SERPL-SCNC: 104 MMOL/L — SIGNIFICANT CHANGE UP (ref 98–107)
CO2 SERPL-SCNC: 21 MMOL/L — LOW (ref 22–31)
CREAT SERPL-MCNC: 0.41 MG/DL — LOW (ref 0.5–1.3)
EGFR: 133 ML/MIN/1.73M2 — SIGNIFICANT CHANGE UP
GLUCOSE SERPL-MCNC: 95 MG/DL — SIGNIFICANT CHANGE UP (ref 70–99)
HCT VFR BLD CALC: 36.3 % — SIGNIFICANT CHANGE UP (ref 34.5–45)
HGB BLD-MCNC: 12.2 G/DL — SIGNIFICANT CHANGE UP (ref 11.5–15.5)
MAGNESIUM SERPL-MCNC: 2 MG/DL — SIGNIFICANT CHANGE UP (ref 1.6–2.6)
MCHC RBC-ENTMCNC: 30.7 PG — SIGNIFICANT CHANGE UP (ref 27–34)
MCHC RBC-ENTMCNC: 33.6 GM/DL — SIGNIFICANT CHANGE UP (ref 32–36)
MCV RBC AUTO: 91.2 FL — SIGNIFICANT CHANGE UP (ref 80–100)
NRBC # BLD: 0 /100 WBCS — SIGNIFICANT CHANGE UP
NRBC # FLD: 0 K/UL — SIGNIFICANT CHANGE UP
PHOSPHATE SERPL-MCNC: 2.9 MG/DL — SIGNIFICANT CHANGE UP (ref 2.5–4.5)
PLATELET # BLD AUTO: 273 K/UL — SIGNIFICANT CHANGE UP (ref 150–400)
POTASSIUM SERPL-MCNC: 3.4 MMOL/L — LOW (ref 3.5–5.3)
POTASSIUM SERPL-SCNC: 3.4 MMOL/L — LOW (ref 3.5–5.3)
RBC # BLD: 3.98 M/UL — SIGNIFICANT CHANGE UP (ref 3.8–5.2)
RBC # FLD: 12 % — SIGNIFICANT CHANGE UP (ref 10.3–14.5)
SODIUM SERPL-SCNC: 136 MMOL/L — SIGNIFICANT CHANGE UP (ref 135–145)
WBC # BLD: 12.27 K/UL — HIGH (ref 3.8–10.5)
WBC # FLD AUTO: 12.27 K/UL — HIGH (ref 3.8–10.5)

## 2022-06-22 RX ORDER — ACETAMINOPHEN 500 MG
975 TABLET ORAL EVERY 6 HOURS
Refills: 0 | Status: DISCONTINUED | OUTPATIENT
Start: 2022-06-22 | End: 2022-06-22

## 2022-06-22 RX ORDER — CEPHALEXIN 500 MG
1 CAPSULE ORAL
Qty: 28 | Refills: 0
Start: 2022-06-22 | End: 2022-06-28

## 2022-06-22 RX ORDER — CEPHALEXIN 500 MG
1 CAPSULE ORAL
Qty: 14 | Refills: 0
Start: 2022-06-22 | End: 2022-06-28

## 2022-06-22 RX ORDER — ACETAMINOPHEN 500 MG
2 TABLET ORAL
Qty: 0 | Refills: 0 | DISCHARGE
Start: 2022-06-22

## 2022-06-22 RX ORDER — SODIUM,POTASSIUM PHOSPHATES 278-250MG
2 POWDER IN PACKET (EA) ORAL ONCE
Refills: 0 | Status: COMPLETED | OUTPATIENT
Start: 2022-06-22 | End: 2022-06-22

## 2022-06-22 RX ORDER — ACETAMINOPHEN 500 MG
1000 TABLET ORAL ONCE
Refills: 0 | Status: COMPLETED | OUTPATIENT
Start: 2022-06-22 | End: 2022-06-22

## 2022-06-22 RX ORDER — ACETAMINOPHEN 500 MG
3 TABLET ORAL
Qty: 0 | Refills: 0 | DISCHARGE
Start: 2022-06-22

## 2022-06-22 RX ADMIN — Medication 400 MILLIGRAM(S): at 01:56

## 2022-06-22 RX ADMIN — Medication 400 MILLIGRAM(S): at 07:59

## 2022-06-22 RX ADMIN — HEPARIN SODIUM 5000 UNIT(S): 5000 INJECTION INTRAVENOUS; SUBCUTANEOUS at 04:59

## 2022-06-22 RX ADMIN — Medication 1000 MILLIGRAM(S): at 08:20

## 2022-06-22 RX ADMIN — Medication 2 PACKET(S): at 10:20

## 2022-06-22 RX ADMIN — Medication 100 MILLIGRAM(S): at 04:59

## 2022-06-22 NOTE — DISCHARGE NOTE PROVIDER - PROVIDER TOKENS
PROVIDER:[TOKEN:[[id number]],FOLLOWUP:[1 week]] PROVIDER:[TOKEN:[[id number]],FOLLOWUP:[2 weeks]],PROVIDER:[TOKEN:[[id number]],FOLLOWUP:[1 week]]

## 2022-06-22 NOTE — DISCHARGE NOTE PROVIDER - NSDCFUADDINST_GEN_ALL_CORE_FT
PAIN CONTROL: Please take Tylenol 975 mg every 6 hours as needed for pain. Please DO NOT take Ibuprofen or NSAIDs during pregnancy as this may harm the fetus. If you have any other questions regarding pain control, please call your surgeons office or OBGYN.   HÉCTOR drain: You will be discharged with 2 HÉCTOR drains from the right breast. Please empty 2x a day or as needed. Document quantity and color of output and bring to your follow up visit. Do not remove the drain by yourself. It will be removed in the outpatient surgical office by a provider when the time is appropriate.  WOUND CARE:  Please keep incisions clean and dry. Please do not Scrub or rub incisions. Do not use lotion or powder on incisions.   BATHING: You may shower and/or sponge bathe. You may use warm soapy water in the shower and rinse, pat dry.  ACTIVITY: No heavy lifting or straining. Otherwise, you may return to your usual level of physical activity. If you are taking narcotic pain medication DO NOT drive a car, operate machinery or make important decisions.  DIET: Return to your usual diet.  NOTIFY YOUR SURGEON IF YOU HAVE: any bleeding that does not stop, any pus draining from your wound(s), any fever (over 100.4 F) persistent nausea/vomiting, or if your pain is not controlled on your discharge pain medications, unable to urinate.  Please follow up with your primary care physician in one week regarding your hospitalization, bring copies of your discharge paperwork.  Please follow up with your surgeon, Dr. Gross. PAIN CONTROL: Please take Tylenol 975 mg every 6 hours as needed for pain. Please DO NOT take Ibuprofen or NSAIDs during pregnancy as this may harm the fetus. If you have any other questions regarding pain control, please call your surgeons office or OBGYN.   HÉCTOR drain: You will be discharged with 2 HÉCTOR drains from the right breast. Please empty 2x a day or as needed. Document quantity and color of output and bring to your follow up visit. Do not remove the drain by yourself. It will be removed in the outpatient surgical office by a provider when the time is appropriate.  WOUND CARE:  Please keep incisions clean and dry. Please do not Scrub or rub incisions. Do not use lotion or powder on incisions.   BATHING: You may shower and/or sponge bathe. You may use warm soapy water in the shower and rinse, pat dry.  ACTIVITY: No heavy lifting or straining. Otherwise, you may return to your usual level of physical activity. If you are taking narcotic pain medication DO NOT drive a car, operate machinery or make important decisions.  DIET: Return to your usual diet.  NOTIFY YOUR SURGEON IF YOU HAVE: any bleeding that does not stop, any pus draining from your wound(s), any fever (over 100.4 F) persistent nausea/vomiting, or if your pain is not controlled on your discharge pain medications, unable to urinate.  Please follow up with your primary care physician in one week regarding your hospitalization, bring copies of your discharge paperwork.  Please follow up with your surgeon, Dr. Gross and Dr. Hugh Siddiqui.

## 2022-06-22 NOTE — PROGRESS NOTE ADULT - ASSESSMENT
31F 13w4d GA patient S/P right modified mastectomy, multiple positive lymph nodes, s/p axillary LN dissection, tissue expander filled w/ air, mary x2.     Plan:  - Pain control PRN  - Diet: regular   - Activity: oob/ambulate as tolerated  - DVT ppx: SCD's & SQH  - Abx: ancef  - Discharge: home today, drain teaching    D team surgery  [id number]     31F 13w4d GA patient S/P right modified mastectomy, multiple positive lymph nodes, s/p axillary LN dissection, tissue expander filled w/ air, mary x2.     Plan:  - Pain control with Tylenol; no NSAIDs during pregnancy  - Diet: regular   - Activity: oob/ambulate as tolerated  - DVT ppx: SCD's & SQH  - Discharge: home today, drain teaching    D team surgery  [id number]

## 2022-06-22 NOTE — PROGRESS NOTE ADULT - SUBJECTIVE AND OBJECTIVE BOX
Pain controlled  AVSS  Skin flaps viable, JPs serosang    POD1  Stable Probable D/C home  F/U Dr. Siddiqui next week  F/U Dr. Gross 2 weeks  HÉCTOR teaching   Tylenol only for pain mgmt

## 2022-06-22 NOTE — DISCHARGE NOTE PROVIDER - NSDCCPCAREPLAN_GEN_ALL_CORE_FT
PRINCIPAL DISCHARGE DIAGNOSIS  Diagnosis: Malignant neoplasm of unspecified site of unspecified female breast  Assessment and Plan of Treatment:

## 2022-06-22 NOTE — DISCHARGE NOTE NURSING/CASE MANAGEMENT/SOCIAL WORK - PATIENT PORTAL LINK FT
You can access the FollowMyHealth Patient Portal offered by [facility] by registering at the following website: [email or web address]. By joining [employer]’s FollowMyHealth portal, you will also be able to view your health information using other applications (apps) compatible with our system.

## 2022-06-22 NOTE — DISCHARGE NOTE PROVIDER - NSDCMRMEDTOKEN_GEN_ALL_CORE_FT
acetaminophen 325 mg oral tablet: 3 tab(s) orally every 6 hours  cephalexin 500 mg oral tablet: 1 tab(s) orally every 6 hours   prenatal vitamin: 1 tab(s) orally once a day. last dose 6/13/22.  Probiotic Formula oral capsule: 1 cap(s) orally once a day  Xyzal: 1 tab(s) orally once a day

## 2022-06-22 NOTE — DISCHARGE NOTE PROVIDER - CARE PROVIDER_API CALL
Shahzad Gross)  Surgery  [address] [place] [address]  Phone: [phone]  Fax: [phone]  Follow Up Time: 1 week   Shahzad Gross)  Surgery  [address] [place] [address]  Phone: [phone]  Fax: [phone]  Follow Up Time: 2 weeks    Hugh Siddiqui)  ColonRectal Surgery; Plastic Surgery; Surgery; Surgery of the Hand  [address] [place] [address], [facility] [address]  [place] [address]  Phone: [phone]  Fax: [phone]  Follow Up Time: 1 week

## 2022-06-22 NOTE — DISCHARGE NOTE PROVIDER - NSDCFUSCHEDAPPT_GEN_ALL_CORE_FT
[facility]  PLASTICSUR [address]  Scheduled Appointment: 06/29/2022    [facility]  ANTEPARTUM [address]  Scheduled Appointment: 07/11/2022    [facility]  MATERNAL [address]  Scheduled Appointment: 07/11/2022    Amy Jason  [facility]  Candice Corcoran  Scheduled Appointment: 07/11/2022

## 2022-06-22 NOTE — PROGRESS NOTE ADULT - SUBJECTIVE AND OBJECTIVE BOX
SURGERY DAILY PROGRESS NOTE:     SUBJECTIVE/ROS: No acute events overnight. Patient seen and examined bedside on AM rounds.    OBJECTIVE:  Vital Signs Last 24 Hrs  T(C): 37 (22 Jun 2022 00:10), Max: 37.6 (21 Jun 2022 17:24)  T(F): 98.6 (22 Jun 2022 00:10), Max: 99.7 (21 Jun 2022 17:24)  HR: 87 (22 Jun 2022 00:10) (52 - 99)  BP: 100/57 (22 Jun 2022 00:10) (90/76 - 114/72)  BP(mean): 75 (21 Jun 2022 16:00) (73 - 87)  RR: 18 (22 Jun 2022 00:10) (12 - 20)  SpO2: 98% (22 Jun 2022 00:10) (93% - 100%)    PHYSICAL EXAM:  General: NAD, resting comfortably in bed  HEENT: Normocephalic atraumatic  CW: R breast with tissue expander, axillary fullness, soft, ecchymosis, incision c/d/i, mary drain x2 with s/s output.  Respiratory: Nonlabored respirations, normal CW expansion  Vascular: extremities are warm and well perfused.          I&O's Detail    21 Jun 2022 07:01  -  22 Jun 2022 00:56  --------------------------------------------------------  IN:    IV PiggyBack: 100 mL    Lactated Ringers: 60 mL    Oral Fluid: 220 mL  Total IN: 380 mL    OUT:    Bulb (mL): 97 mL    Bulb (mL): 57 mL    Voided (mL): 501 mL  Total OUT: 655 mL    Total NET: -275 mL          IMAGING:               SURGERY DAILY PROGRESS NOTE:     SUBJECTIVE/ROS: No acute events overnight. Patient seen and examined bedside on AM rounds. Patient is pain controlled, ambulating, voiding, and tolerating diet. Denies fever, chills, N/V, chest pain, abdominal pain, SOB    OBJECTIVE:  Vital Signs Last 24 Hrs  ICU Vital Signs Last 24 Hrs  T(C): 36.9 (22 Jun 2022 08:00), Max: 37.6 (21 Jun 2022 17:24)  T(F): 98.5 (22 Jun 2022 08:00), Max: 99.7 (21 Jun 2022 17:24)  HR: 97 (22 Jun 2022 08:00) (52 - 99)  BP: 106/57 (22 Jun 2022 08:00) (90/76 - 114/72)  BP(mean): 75 (21 Jun 2022 16:00) (73 - 87)  ABP: --  ABP(mean): --  RR: 18 (22 Jun 2022 08:00) (12 - 20)  SpO2: 97% (22 Jun 2022 08:00) (93% - 100%)    INS AND OUTS  I&O's Detail    21 Jun 2022 07:01  -  22 Jun 2022 07:00  --------------------------------------------------------  IN:    IV PiggyBack: 100 mL    Lactated Ringers: 300 mL    Oral Fluid: 220 mL  Total IN: 620 mL    OUT:    Bulb (mL): 87 mL    Bulb (mL): 117 mL    Voided (mL): 1401 mL  Total OUT: 1605 mL    Total NET: -985 mL      22 Jun 2022 07:01  -  22 Jun 2022 08:38  --------------------------------------------------------  IN:    IV PiggyBack: 100 mL    Oral Fluid: 120 mL  Total IN: 220 mL    OUT:    Bulb (mL): 0 mL    Bulb (mL): 0 mL  Total OUT: 0 mL    Total NET: 220 mL        PHYSICAL EXAM:  General: NAD, resting comfortably in bed  HEENT: Normocephalic atraumatic  CW: R breast with tissue expander, axillary fullness, soft, incision c/d/i, mary drain x2 with s/s output. No palpable hematoma  Respiratory: Nonlabored respirations, normal CW expansion  Vascular: extremities are warm and well perfused.       IMAGING:

## 2022-06-22 NOTE — DISCHARGE NOTE PROVIDER - CARE PROVIDERS DIRECT ADDRESSES
[email or web address]

## 2022-06-22 NOTE — DISCHARGE NOTE PROVIDER - HOSPITAL COURSE
31F 13w4d GA patient S/P right modified mastectomy, multiple positive lymph nodes, s/p axillary LN dissection, tissue expander filled w/ air, mary x2. Fetal heart rate was documented pre and postoperatively. Patient tolerated operation well and there were no post-operative complications identified. Patient remained hemodynamically stable in the PACU and transferred to the surgical floor. Diet was restarted and advanced as tolerated. Pain control was transitioned from IV to PO pain meds. At this time, patient is currently ambulating, voiding, tolerating a regular diet. Pain well controlled on PO pain meds. Patient has been deemed stable for discharge home with follow up as an outpatient.

## 2022-06-22 NOTE — DISCHARGE NOTE NURSING/CASE MANAGEMENT/SOCIAL WORK - NSDCPEFALRISK_GEN_ALL_CORE
For information on Fall & Injury Prevention, visit: [email or web address] [place]  [email or web address] [place]  https://www.cdc.gov/steadi/patient.html

## 2022-06-22 NOTE — DISCHARGE NOTE PROVIDER - NSDCFUADDAPPT_GEN_ALL_CORE_FT
Please follow up with Dr Gross in 1 week after discharge from hospital.  Please follow up with scheduled OBGYN at 16 weeks gestation. If you notice any vaginal bleeding or severe abdominal pain, please seek help immediately.

## 2022-06-23 ENCOUNTER — NON-APPOINTMENT (OUTPATIENT)
Age: 32
End: 2022-06-23

## 2022-06-29 ENCOUNTER — APPOINTMENT (OUTPATIENT)
Dept: PLASTIC SURGERY | Facility: CLINIC | Age: 32
End: 2022-06-29
Payer: COMMERCIAL

## 2022-06-29 VITALS
OXYGEN SATURATION: 99 % | WEIGHT: 200 LBS | HEART RATE: 88 BPM | TEMPERATURE: 98.1 F | BODY MASS INDEX: 33.32 KG/M2 | HEIGHT: 65 IN | SYSTOLIC BLOOD PRESSURE: 116 MMHG | DIASTOLIC BLOOD PRESSURE: 75 MMHG

## 2022-06-29 PROCEDURE — 99024 POSTOP FOLLOW-UP VISIT: CPT

## 2022-07-05 ENCOUNTER — NON-APPOINTMENT (OUTPATIENT)
Age: 32
End: 2022-07-05

## 2022-07-05 ENCOUNTER — APPOINTMENT (OUTPATIENT)
Dept: PLASTIC SURGERY | Facility: CLINIC | Age: 32
End: 2022-07-05

## 2022-07-05 LAB
SURGICAL PATHOLOGY STUDY: SIGNIFICANT CHANGE UP

## 2022-07-06 ENCOUNTER — APPOINTMENT (OUTPATIENT)
Dept: SURGICAL ONCOLOGY | Facility: CLINIC | Age: 32
End: 2022-07-06

## 2022-07-06 VITALS
HEART RATE: 88 BPM | TEMPERATURE: 98.3 F | OXYGEN SATURATION: 100 % | WEIGHT: 200 LBS | HEIGHT: 65 IN | RESPIRATION RATE: 15 BRPM | DIASTOLIC BLOOD PRESSURE: 73 MMHG | SYSTOLIC BLOOD PRESSURE: 107 MMHG | BODY MASS INDEX: 33.32 KG/M2

## 2022-07-06 PROCEDURE — 99024 POSTOP FOLLOW-UP VISIT: CPT

## 2022-07-07 ENCOUNTER — APPOINTMENT (OUTPATIENT)
Dept: PLASTIC SURGERY | Facility: CLINIC | Age: 32
End: 2022-07-07

## 2022-07-07 VITALS
BODY MASS INDEX: 33.32 KG/M2 | SYSTOLIC BLOOD PRESSURE: 115 MMHG | TEMPERATURE: 98.3 F | HEIGHT: 65 IN | DIASTOLIC BLOOD PRESSURE: 77 MMHG | OXYGEN SATURATION: 99 % | HEART RATE: 97 BPM | WEIGHT: 200 LBS

## 2022-07-07 PROCEDURE — 99024 POSTOP FOLLOW-UP VISIT: CPT

## 2022-07-11 ENCOUNTER — ASOB RESULT (OUTPATIENT)
Age: 32
End: 2022-07-11

## 2022-07-11 ENCOUNTER — RESULT REVIEW (OUTPATIENT)
Age: 32
End: 2022-07-11

## 2022-07-11 ENCOUNTER — NON-APPOINTMENT (OUTPATIENT)
Age: 32
End: 2022-07-11

## 2022-07-11 ENCOUNTER — APPOINTMENT (OUTPATIENT)
Dept: ANTEPARTUM | Facility: CLINIC | Age: 32
End: 2022-07-11

## 2022-07-11 ENCOUNTER — APPOINTMENT (OUTPATIENT)
Dept: HEMATOLOGY ONCOLOGY | Facility: CLINIC | Age: 32
End: 2022-07-11

## 2022-07-11 ENCOUNTER — APPOINTMENT (OUTPATIENT)
Dept: MATERNAL FETAL MEDICINE | Facility: CLINIC | Age: 32
End: 2022-07-11

## 2022-07-11 VITALS
BODY MASS INDEX: 33.28 KG/M2 | SYSTOLIC BLOOD PRESSURE: 114 MMHG | WEIGHT: 199.74 LBS | TEMPERATURE: 98.1 F | HEIGHT: 65 IN | HEART RATE: 112 BPM | RESPIRATION RATE: 16 BRPM | OXYGEN SATURATION: 99 % | DIASTOLIC BLOOD PRESSURE: 78 MMHG

## 2022-07-11 VITALS
BODY MASS INDEX: 33.15 KG/M2 | OXYGEN SATURATION: 97 % | DIASTOLIC BLOOD PRESSURE: 75 MMHG | HEART RATE: 101 BPM | WEIGHT: 199 LBS | SYSTOLIC BLOOD PRESSURE: 116 MMHG | HEIGHT: 65 IN

## 2022-07-11 LAB
BASOPHILS # BLD AUTO: 0.02 K/UL — SIGNIFICANT CHANGE UP (ref 0–0.2)
BASOPHILS NFR BLD AUTO: 0.2 % — SIGNIFICANT CHANGE UP (ref 0–2)
EOSINOPHIL # BLD AUTO: 0.16 K/UL — SIGNIFICANT CHANGE UP (ref 0–0.5)
EOSINOPHIL NFR BLD AUTO: 1.2 % — SIGNIFICANT CHANGE UP (ref 0–6)
HCT VFR BLD CALC: 36.5 % — SIGNIFICANT CHANGE UP (ref 34.5–45)
HGB BLD-MCNC: 12.3 G/DL — SIGNIFICANT CHANGE UP (ref 11.5–15.5)
IMM GRANULOCYTES NFR BLD AUTO: 0.7 % — SIGNIFICANT CHANGE UP (ref 0–1.5)
LYMPHOCYTES # BLD AUTO: 23 % — SIGNIFICANT CHANGE UP (ref 13–44)
LYMPHOCYTES # BLD AUTO: 3.06 K/UL — SIGNIFICANT CHANGE UP (ref 1–3.3)
MCHC RBC-ENTMCNC: 30 PG — SIGNIFICANT CHANGE UP (ref 27–34)
MCHC RBC-ENTMCNC: 33.7 G/DL — SIGNIFICANT CHANGE UP (ref 32–36)
MCV RBC AUTO: 89 FL — SIGNIFICANT CHANGE UP (ref 80–100)
MONOCYTES # BLD AUTO: 0.56 K/UL — SIGNIFICANT CHANGE UP (ref 0–0.9)
MONOCYTES NFR BLD AUTO: 4.2 % — SIGNIFICANT CHANGE UP (ref 2–14)
NEUTROPHILS # BLD AUTO: 9.41 K/UL — HIGH (ref 1.8–7.4)
NEUTROPHILS NFR BLD AUTO: 70.7 % — SIGNIFICANT CHANGE UP (ref 43–77)
NRBC # BLD: 0 /100 WBCS — SIGNIFICANT CHANGE UP (ref 0–0)
PLATELET # BLD AUTO: 319 K/UL — SIGNIFICANT CHANGE UP (ref 150–400)
RBC # BLD: 4.1 M/UL — SIGNIFICANT CHANGE UP (ref 3.8–5.2)
RBC # FLD: 11.9 % — SIGNIFICANT CHANGE UP (ref 10.3–14.5)
WBC # BLD: 13.3 K/UL — HIGH (ref 3.8–10.5)
WBC # FLD AUTO: 13.3 K/UL — HIGH (ref 3.8–10.5)

## 2022-07-11 PROCEDURE — 99215 OFFICE O/P EST HI 40 MIN: CPT

## 2022-07-11 PROCEDURE — 99214 OFFICE O/P EST MOD 30 MIN: CPT | Mod: 25

## 2022-07-11 PROCEDURE — 93010 ELECTROCARDIOGRAM REPORT: CPT

## 2022-07-11 PROCEDURE — 76805 OB US >/= 14 WKS SNGL FETUS: CPT

## 2022-07-12 LAB
ALBUMIN SERPL ELPH-MCNC: 4.3 G/DL
ALP BLD-CCNC: 67 U/L
ALT SERPL-CCNC: 15 U/L
ANION GAP SERPL CALC-SCNC: 13 MMOL/L
APTT BLD: 28.6 SEC
AST SERPL-CCNC: 18 U/L
BILIRUB SERPL-MCNC: 0.2 MG/DL
BUN SERPL-MCNC: 6 MG/DL
CALCIUM SERPL-MCNC: 9.8 MG/DL
CHLORIDE SERPL-SCNC: 105 MMOL/L
CO2 SERPL-SCNC: 22 MMOL/L
CREAT SERPL-MCNC: 0.48 MG/DL
EGFR: 128 ML/MIN/1.73M2
GLUCOSE SERPL-MCNC: 93 MG/DL
HAV IGM SER QL: NONREACTIVE
HBV CORE IGM SER QL: NONREACTIVE
HBV SURFACE AG SER QL: NONREACTIVE
HCV AB SER QL: NONREACTIVE
HCV S/CO RATIO: 0.09 S/CO
INR PPP: 1.08 RATIO
POTASSIUM SERPL-SCNC: 3.9 MMOL/L
PROT SERPL-MCNC: 6.4 G/DL
PT BLD: 12.5 SEC
SODIUM SERPL-SCNC: 140 MMOL/L

## 2022-07-14 ENCOUNTER — APPOINTMENT (OUTPATIENT)
Dept: PLASTIC SURGERY | Facility: CLINIC | Age: 32
End: 2022-07-14

## 2022-07-14 VITALS — HEIGHT: 65 IN | WEIGHT: 199 LBS | BODY MASS INDEX: 33.15 KG/M2 | TEMPERATURE: 98.4 F

## 2022-07-14 PROCEDURE — 99024 POSTOP FOLLOW-UP VISIT: CPT

## 2022-07-21 ENCOUNTER — APPOINTMENT (OUTPATIENT)
Dept: PLASTIC SURGERY | Facility: CLINIC | Age: 32
End: 2022-07-21

## 2022-07-21 VITALS
TEMPERATURE: 98.7 F | DIASTOLIC BLOOD PRESSURE: 63 MMHG | HEIGHT: 65 IN | WEIGHT: 199 LBS | SYSTOLIC BLOOD PRESSURE: 133 MMHG | OXYGEN SATURATION: 99 % | BODY MASS INDEX: 33.15 KG/M2 | HEART RATE: 88 BPM

## 2022-07-21 PROCEDURE — 99024 POSTOP FOLLOW-UP VISIT: CPT

## 2022-07-22 ENCOUNTER — RESULT REVIEW (OUTPATIENT)
Age: 32
End: 2022-07-22

## 2022-07-22 ENCOUNTER — OUTPATIENT (OUTPATIENT)
Dept: OUTPATIENT SERVICES | Facility: HOSPITAL | Age: 32
LOS: 1 days | Discharge: HOME | End: 2022-07-22

## 2022-07-22 ENCOUNTER — APPOINTMENT (OUTPATIENT)
Dept: HEMATOLOGY ONCOLOGY | Facility: CLINIC | Age: 32
End: 2022-07-22

## 2022-07-22 DIAGNOSIS — C50.919 MALIGNANT NEOPLASM OF UNSPECIFIED SITE OF UNSPECIFIED FEMALE BREAST: ICD-10-CM

## 2022-07-22 DIAGNOSIS — Z90.89 ACQUIRED ABSENCE OF OTHER ORGANS: Chronic | ICD-10-CM

## 2022-07-22 LAB
BASOPHILS # BLD AUTO: 0.02 K/UL — SIGNIFICANT CHANGE UP (ref 0–0.2)
BASOPHILS NFR BLD AUTO: 0.1 % — SIGNIFICANT CHANGE UP (ref 0–2)
EOSINOPHIL # BLD AUTO: 0.3 K/UL — SIGNIFICANT CHANGE UP (ref 0–0.5)
EOSINOPHIL NFR BLD AUTO: 2.1 % — SIGNIFICANT CHANGE UP (ref 0–6)
HCT VFR BLD CALC: 35.9 % — SIGNIFICANT CHANGE UP (ref 34.5–45)
HGB BLD-MCNC: 12.6 G/DL — SIGNIFICANT CHANGE UP (ref 11.5–15.5)
IMM GRANULOCYTES NFR BLD AUTO: 0.5 % — SIGNIFICANT CHANGE UP (ref 0–1.5)
LYMPHOCYTES # BLD AUTO: 22.9 % — SIGNIFICANT CHANGE UP (ref 13–44)
LYMPHOCYTES # BLD AUTO: 3.35 K/UL — HIGH (ref 1–3.3)
MCHC RBC-ENTMCNC: 30.5 PG — SIGNIFICANT CHANGE UP (ref 27–34)
MCHC RBC-ENTMCNC: 35.1 G/DL — SIGNIFICANT CHANGE UP (ref 32–36)
MCV RBC AUTO: 86.9 FL — SIGNIFICANT CHANGE UP (ref 80–100)
MONOCYTES # BLD AUTO: 0.72 K/UL — SIGNIFICANT CHANGE UP (ref 0–0.9)
MONOCYTES NFR BLD AUTO: 4.9 % — SIGNIFICANT CHANGE UP (ref 2–14)
NEUTROPHILS # BLD AUTO: 10.16 K/UL — HIGH (ref 1.8–7.4)
NEUTROPHILS NFR BLD AUTO: 69.5 % — SIGNIFICANT CHANGE UP (ref 43–77)
NRBC # BLD: 0 /100 WBCS — SIGNIFICANT CHANGE UP (ref 0–0)
PLATELET # BLD AUTO: 290 K/UL — SIGNIFICANT CHANGE UP (ref 150–400)
RBC # BLD: 4.13 M/UL — SIGNIFICANT CHANGE UP (ref 3.8–5.2)
RBC # FLD: 12.2 % — SIGNIFICANT CHANGE UP (ref 10.3–14.5)
WBC # BLD: 14.63 K/UL — HIGH (ref 3.8–10.5)
WBC # FLD AUTO: 14.63 K/UL — HIGH (ref 3.8–10.5)

## 2022-07-25 ENCOUNTER — RESULT REVIEW (OUTPATIENT)
Age: 32
End: 2022-07-25

## 2022-07-25 ENCOUNTER — OUTPATIENT (OUTPATIENT)
Dept: OUTPATIENT SERVICES | Facility: HOSPITAL | Age: 32
LOS: 1 days | End: 2022-07-25
Payer: COMMERCIAL

## 2022-07-25 ENCOUNTER — TRANSCRIPTION ENCOUNTER (OUTPATIENT)
Age: 32
End: 2022-07-25

## 2022-07-25 VITALS
DIASTOLIC BLOOD PRESSURE: 68 MMHG | SYSTOLIC BLOOD PRESSURE: 106 MMHG | HEART RATE: 94 BPM | OXYGEN SATURATION: 97 % | RESPIRATION RATE: 20 BRPM

## 2022-07-25 VITALS
DIASTOLIC BLOOD PRESSURE: 66 MMHG | WEIGHT: 199.96 LBS | HEIGHT: 65 IN | HEART RATE: 84 BPM | OXYGEN SATURATION: 99 % | SYSTOLIC BLOOD PRESSURE: 101 MMHG | TEMPERATURE: 98 F | RESPIRATION RATE: 16 BRPM

## 2022-07-25 DIAGNOSIS — Z90.89 ACQUIRED ABSENCE OF OTHER ORGANS: Chronic | ICD-10-CM

## 2022-07-25 DIAGNOSIS — C50.911 MALIGNANT NEOPLASM OF UNSPECIFIED SITE OF RIGHT FEMALE BREAST: ICD-10-CM

## 2022-07-25 PROCEDURE — 36561 INSERT TUNNELED CV CATH: CPT

## 2022-07-25 PROCEDURE — 77001 FLUOROGUIDE FOR VEIN DEVICE: CPT | Mod: 26

## 2022-07-25 PROCEDURE — 77001 FLUOROGUIDE FOR VEIN DEVICE: CPT

## 2022-07-25 PROCEDURE — C1769: CPT

## 2022-07-25 PROCEDURE — C1894: CPT

## 2022-07-25 PROCEDURE — C1788: CPT

## 2022-07-25 PROCEDURE — 76937 US GUIDE VASCULAR ACCESS: CPT | Mod: 26

## 2022-07-25 PROCEDURE — 76937 US GUIDE VASCULAR ACCESS: CPT

## 2022-07-25 NOTE — PROGRESS NOTE ADULT - SUBJECTIVE AND OBJECTIVE BOX
IR Post Procedure Note    Diagnosis: R Breast Ca    Procedure: Left Chest Port    : Reid Soler MD    Contrast: None    Anesthesia: 1% Lidocaine Subcutaneous    Estimated Blood Loss: Less than 10cc    Specimens: None    Complications: No Immediate Complications    Anticoagulation: Resume in 24 Hours    Findings & Plan: Left IJ Chest port placed under US and fluoroscopic guidance. Incision closed w absorbable sutures and dermabond. Tip at cavoatrial junction, flushed w heparin, functions well and OK to use.

## 2022-07-25 NOTE — ASU DISCHARGE PLAN (ADULT/PEDIATRIC) - ASU DC SPECIAL INSTRUCTIONSFT
Chest Port Placement    Discharge Instructions  - You have had a chest port implanted in your chest.   - The port is ready for use.  - You may shower in 48 hours. No soaking or swimming for 2 weeks or until the site is completely healed.  - Keep the area covered and dry for the next 7 days. It may be removed by a chemotherapy nurse as needed for treatment.  - Do not perform any heavy lifting or put tension on the area for the next week or until the site is healed.  - The skin glue (Dermabond) on your skin will act as a scab, allow it to fall off on its own over the next 1-3 weeks.  - You may resume your normal diet.  - You may resume your normal medications however you should wait 48 hours before restarting aspirin, plavix, or blood thinners.  - It is normal to experience some pain over the site for the next few days. You may take apply ice to the area (20 minutes on, 20 minutes off) and take Tylenol for that pain. Do not take more frequently than every 6 hours and do not exceed more than 3000mg of Tylenol in a 24 hour period.    - You were given conscious sedation which may make you drowsy, therefore you need someone to stay with you until the morning following the procedure.  - Do not drive, engage in heavy lifting or strenuous activity, or drink any alcoholic beverages for the next 24 hours.   - You may resume normal activity in 24 hours.    Notify your primary physician and/or Interventional Radiology IMMEDIATELY if you experience any of the following       - Fever of 101F or 38C       - Chills or Rigors/ Shakes       - Swelling and/or Redness in the area around the port       - Worsening Pain       - Blood soaked bandages or worsening bleeding       - Lightheadedness and/or dizziness upon standing       - Chest Pain/ Tightness       - Shortness of Breath       - Difficulty walking    If you have a problem that you believe requires IMMEDIATE attention, please go to your NEAREST Emergency Room. If you believe your problem can safely wait until you speak to a physician, please call Interventional Radiology for any concerns.    During Normal Weekday Business Hours- You can contact the Interventional Radiology department during normal business hours via telephone.  During Evenings and Weekends- If you need to contact Interventional Radiology during off hours, do so by calling the hospital and requesting to be connected to the Interventional Radiologist on call.

## 2022-07-25 NOTE — ASU DISCHARGE PLAN (ADULT/PEDIATRIC) - NURSING INSTRUCTIONS
Please feel free to contact us at [phone] if any problems arise. After 6PM, Monday through Friday, on weekends and on holidays, please call [phone] and ask for the radiology resident on call to be paged.

## 2022-07-25 NOTE — CHART NOTE - NSCHARTNOTEFT_GEN_A_CORE
R1 GYN Fetal Heart Tones    30yo at 17w6d s/p L IJ chest port. FH check performed post-op. FHR: 145.    Wanda Mata, PGY1

## 2022-07-28 ENCOUNTER — APPOINTMENT (OUTPATIENT)
Dept: PLASTIC SURGERY | Facility: CLINIC | Age: 32
End: 2022-07-28

## 2022-07-28 VITALS
DIASTOLIC BLOOD PRESSURE: 78 MMHG | HEART RATE: 98 BPM | WEIGHT: 199 LBS | SYSTOLIC BLOOD PRESSURE: 112 MMHG | BODY MASS INDEX: 33.15 KG/M2 | TEMPERATURE: 98 F | HEIGHT: 65 IN | OXYGEN SATURATION: 98 %

## 2022-07-28 LAB
BASOPHILS # BLD AUTO: 0.04 K/UL
BASOPHILS NFR BLD AUTO: 0.3 %
EOSINOPHIL # BLD AUTO: 0.23 K/UL
EOSINOPHIL NFR BLD AUTO: 1.7 %
HCT VFR BLD CALC: 39.2 %
HGB BLD-MCNC: 12 G/DL
IMM GRANULOCYTES NFR BLD AUTO: 0.4 %
LYMPHOCYTES # BLD AUTO: 2.99 K/UL
LYMPHOCYTES NFR BLD AUTO: 22.2 %
MAN DIFF?: NORMAL
MCHC RBC-ENTMCNC: 29.9 PG
MCHC RBC-ENTMCNC: 30.6 GM/DL
MCV RBC AUTO: 97.5 FL
MONOCYTES # BLD AUTO: 0.71 K/UL
MONOCYTES NFR BLD AUTO: 5.3 %
NEUTROPHILS # BLD AUTO: 9.42 K/UL
NEUTROPHILS NFR BLD AUTO: 70.1 %
PLATELET # BLD AUTO: 303 K/UL
RBC # BLD: 4.02 M/UL
RBC # FLD: 13.1 %
WBC # FLD AUTO: 13.45 K/UL

## 2022-07-28 PROCEDURE — 99024 POSTOP FOLLOW-UP VISIT: CPT

## 2022-07-28 NOTE — PATIENT PROFILE ADULT - NSPROMEDSBROUGHTTOHOSP_GEN_A_NUR
Refill Decision Note   Africa Jennings  is requesting a refill authorization.  Brief Assessment and Rationale for Refill:  Quick Discontinue    -Medication-Related Problems Identified: Requires labs  Medication Therapy Plan:       Medication Reconciliation Completed: No   Comments:     Provider Staff:     Action is required for this patient.   Please see care gap opportunities below in Care Due Message.     Thanks!  [facility]     Appointments      Date Provider   Last Visit   11/12/2021 Brandi Thompson MD   Next Visit   7/26/2022 Brandi Thompson MD     Note composed:10:32 PM 07/27/2022           Note composed:10:32 PM 07/27/2022               no

## 2022-07-29 ENCOUNTER — APPOINTMENT (OUTPATIENT)
Dept: CV DIAGNOSITCS | Facility: HOSPITAL | Age: 32
End: 2022-07-29

## 2022-07-29 ENCOUNTER — OUTPATIENT (OUTPATIENT)
Dept: OUTPATIENT SERVICES | Facility: HOSPITAL | Age: 32
LOS: 1 days | End: 2022-07-29
Payer: COMMERCIAL

## 2022-07-29 DIAGNOSIS — Z90.89 ACQUIRED ABSENCE OF OTHER ORGANS: Chronic | ICD-10-CM

## 2022-07-29 DIAGNOSIS — C50.911 MALIGNANT NEOPLASM OF UNSPECIFIED SITE OF RIGHT FEMALE BREAST: ICD-10-CM

## 2022-07-29 PROCEDURE — 93306 TTE W/DOPPLER COMPLETE: CPT | Mod: 26

## 2022-07-29 PROCEDURE — 93356 MYOCRD STRAIN IMG SPCKL TRCK: CPT

## 2022-07-29 PROCEDURE — 93306 TTE W/DOPPLER COMPLETE: CPT

## 2022-07-29 PROCEDURE — 76376 3D RENDER W/INTRP POSTPROCES: CPT

## 2022-08-01 RX ORDER — LIDOCAINE AND PRILOCAINE 25; 25 MG/G; MG/G
2.5-2.5 CREAM TOPICAL
Qty: 1 | Refills: 1 | Status: ACTIVE | COMMUNITY
Start: 2022-08-01 | End: 1900-01-01

## 2022-08-02 LAB
MISCELLANEOUS TEST: NORMAL
PROC NAME: NORMAL

## 2022-08-03 ENCOUNTER — RESULT REVIEW (OUTPATIENT)
Age: 32
End: 2022-08-03

## 2022-08-03 ENCOUNTER — APPOINTMENT (OUTPATIENT)
Dept: HEMATOLOGY ONCOLOGY | Facility: CLINIC | Age: 32
End: 2022-08-03

## 2022-08-03 ENCOUNTER — NON-APPOINTMENT (OUTPATIENT)
Age: 32
End: 2022-08-03

## 2022-08-03 ENCOUNTER — APPOINTMENT (OUTPATIENT)
Dept: INFUSION THERAPY | Facility: HOSPITAL | Age: 32
End: 2022-08-03

## 2022-08-03 VITALS
SYSTOLIC BLOOD PRESSURE: 121 MMHG | TEMPERATURE: 98.1 F | HEIGHT: 65 IN | OXYGEN SATURATION: 99 % | HEART RATE: 104 BPM | RESPIRATION RATE: 16 BRPM | WEIGHT: 202.8 LBS | BODY MASS INDEX: 33.79 KG/M2 | DIASTOLIC BLOOD PRESSURE: 80 MMHG

## 2022-08-03 DIAGNOSIS — C50.911 MALIGNANT NEOPLASM OF UNSPECIFIED SITE OF RIGHT FEMALE BREAST: ICD-10-CM

## 2022-08-03 DIAGNOSIS — K12.31 ORAL MUCOSITIS (ULCERATIVE) DUE TO ANTINEOPLASTIC THERAPY: ICD-10-CM

## 2022-08-03 DIAGNOSIS — Z45.2 ENCOUNTER FOR ADJUSTMENT AND MANAGEMENT OF VASCULAR ACCESS DEVICE: ICD-10-CM

## 2022-08-03 LAB
ALBUMIN SERPL ELPH-MCNC: 4.1 G/DL — SIGNIFICANT CHANGE UP (ref 3.3–5)
ALP SERPL-CCNC: 69 U/L — SIGNIFICANT CHANGE UP (ref 40–120)
ALT FLD-CCNC: 7 U/L — LOW (ref 10–45)
ANION GAP SERPL CALC-SCNC: 13 MMOL/L — SIGNIFICANT CHANGE UP (ref 5–17)
AST SERPL-CCNC: 15 U/L — SIGNIFICANT CHANGE UP (ref 10–40)
BASOPHILS # BLD AUTO: 0.04 K/UL — SIGNIFICANT CHANGE UP (ref 0–0.2)
BASOPHILS NFR BLD AUTO: 0.3 % — SIGNIFICANT CHANGE UP (ref 0–2)
BILIRUB SERPL-MCNC: 0.2 MG/DL — SIGNIFICANT CHANGE UP (ref 0.2–1.2)
BUN SERPL-MCNC: 5 MG/DL — LOW (ref 7–23)
CALCIUM SERPL-MCNC: 9.7 MG/DL — SIGNIFICANT CHANGE UP (ref 8.4–10.5)
CHLORIDE SERPL-SCNC: 105 MMOL/L — SIGNIFICANT CHANGE UP (ref 96–108)
CO2 SERPL-SCNC: 19 MMOL/L — LOW (ref 22–31)
CREAT SERPL-MCNC: 0.36 MG/DL — LOW (ref 0.5–1.3)
EGFR: 137 ML/MIN/1.73M2 — SIGNIFICANT CHANGE UP
EOSINOPHIL # BLD AUTO: 0.19 K/UL — SIGNIFICANT CHANGE UP (ref 0–0.5)
EOSINOPHIL NFR BLD AUTO: 1.6 % — SIGNIFICANT CHANGE UP (ref 0–6)
GLUCOSE SERPL-MCNC: 68 MG/DL — LOW (ref 70–99)
HCT VFR BLD CALC: 34.9 % — SIGNIFICANT CHANGE UP (ref 34.5–45)
HGB BLD-MCNC: 12.2 G/DL — SIGNIFICANT CHANGE UP (ref 11.5–15.5)
IMM GRANULOCYTES NFR BLD AUTO: 1.2 % — SIGNIFICANT CHANGE UP (ref 0–1.5)
LYMPHOCYTES # BLD AUTO: 2.63 K/UL — SIGNIFICANT CHANGE UP (ref 1–3.3)
LYMPHOCYTES # BLD AUTO: 22.3 % — SIGNIFICANT CHANGE UP (ref 13–44)
MCHC RBC-ENTMCNC: 30.5 PG — SIGNIFICANT CHANGE UP (ref 27–34)
MCHC RBC-ENTMCNC: 35 G/DL — SIGNIFICANT CHANGE UP (ref 32–36)
MCV RBC AUTO: 87.3 FL — SIGNIFICANT CHANGE UP (ref 80–100)
MONOCYTES # BLD AUTO: 0.59 K/UL — SIGNIFICANT CHANGE UP (ref 0–0.9)
MONOCYTES NFR BLD AUTO: 5 % — SIGNIFICANT CHANGE UP (ref 2–14)
NEUTROPHILS # BLD AUTO: 8.23 K/UL — HIGH (ref 1.8–7.4)
NEUTROPHILS NFR BLD AUTO: 69.6 % — SIGNIFICANT CHANGE UP (ref 43–77)
NRBC # BLD: 0 /100 WBCS — SIGNIFICANT CHANGE UP (ref 0–0)
PLATELET # BLD AUTO: 259 K/UL — SIGNIFICANT CHANGE UP (ref 150–400)
POTASSIUM SERPL-MCNC: 4 MMOL/L — SIGNIFICANT CHANGE UP (ref 3.5–5.3)
POTASSIUM SERPL-SCNC: 4 MMOL/L — SIGNIFICANT CHANGE UP (ref 3.5–5.3)
PROT SERPL-MCNC: 6.4 G/DL — SIGNIFICANT CHANGE UP (ref 6–8.3)
RBC # BLD: 4 M/UL — SIGNIFICANT CHANGE UP (ref 3.8–5.2)
RBC # FLD: 12.3 % — SIGNIFICANT CHANGE UP (ref 10.3–14.5)
SODIUM SERPL-SCNC: 137 MMOL/L — SIGNIFICANT CHANGE UP (ref 135–145)
WBC # BLD: 11.82 K/UL — HIGH (ref 3.8–10.5)
WBC # FLD AUTO: 11.82 K/UL — HIGH (ref 3.8–10.5)

## 2022-08-03 PROCEDURE — 99215 OFFICE O/P EST HI 40 MIN: CPT

## 2022-08-03 RX ORDER — APREPITANT 80 MG/1
80 CAPSULE ORAL DAILY
Qty: 1 | Refills: 3 | Status: DISCONTINUED | COMMUNITY
Start: 2022-08-01 | End: 2022-08-03

## 2022-08-03 RX ORDER — CEPHALEXIN 500 MG/1
500 TABLET ORAL
Qty: 14 | Refills: 0 | Status: DISCONTINUED | COMMUNITY
Start: 2022-06-22

## 2022-08-03 RX ORDER — AMOXICILLIN 875 MG/1
875 TABLET, FILM COATED ORAL
Qty: 20 | Refills: 0 | Status: DISCONTINUED | COMMUNITY
Start: 2022-04-19

## 2022-08-04 ENCOUNTER — NON-APPOINTMENT (OUTPATIENT)
Age: 32
End: 2022-08-04

## 2022-08-04 DIAGNOSIS — Z51.89 ENCOUNTER FOR OTHER SPECIFIED AFTERCARE: ICD-10-CM

## 2022-08-04 DIAGNOSIS — Z51.11 ENCOUNTER FOR ANTINEOPLASTIC CHEMOTHERAPY: ICD-10-CM

## 2022-08-04 DIAGNOSIS — R11.2 NAUSEA WITH VOMITING, UNSPECIFIED: ICD-10-CM

## 2022-08-05 ENCOUNTER — APPOINTMENT (OUTPATIENT)
Dept: HEMATOLOGY ONCOLOGY | Facility: CLINIC | Age: 32
End: 2022-08-05

## 2022-08-05 VITALS
BODY MASS INDEX: 34.15 KG/M2 | RESPIRATION RATE: 16 BRPM | TEMPERATURE: 97.7 F | WEIGHT: 205.23 LBS | SYSTOLIC BLOOD PRESSURE: 113 MMHG | OXYGEN SATURATION: 98 % | HEART RATE: 105 BPM | DIASTOLIC BLOOD PRESSURE: 75 MMHG

## 2022-08-05 PROBLEM — K12.31 ORAL MUCOSITIS DUE TO ANTINEOPLASTIC THERAPY: Status: ACTIVE | Noted: 2022-08-05

## 2022-08-05 PROCEDURE — 99212 OFFICE O/P EST SF 10 MIN: CPT

## 2022-08-08 ENCOUNTER — APPOINTMENT (OUTPATIENT)
Dept: INFUSION THERAPY | Facility: HOSPITAL | Age: 32
End: 2022-08-08

## 2022-08-11 ENCOUNTER — APPOINTMENT (OUTPATIENT)
Dept: MATERNAL FETAL MEDICINE | Facility: CLINIC | Age: 32
End: 2022-08-11

## 2022-08-11 ENCOUNTER — ASOB RESULT (OUTPATIENT)
Age: 32
End: 2022-08-11

## 2022-08-11 ENCOUNTER — LABORATORY RESULT (OUTPATIENT)
Age: 32
End: 2022-08-11

## 2022-08-11 ENCOUNTER — APPOINTMENT (OUTPATIENT)
Dept: ANTEPARTUM | Facility: CLINIC | Age: 32
End: 2022-08-11

## 2022-08-11 VITALS
WEIGHT: 205.25 LBS | OXYGEN SATURATION: 98 % | BODY MASS INDEX: 34.2 KG/M2 | HEIGHT: 65 IN | HEART RATE: 89 BPM | SYSTOLIC BLOOD PRESSURE: 124 MMHG | DIASTOLIC BLOOD PRESSURE: 74 MMHG

## 2022-08-11 PROCEDURE — 99214 OFFICE O/P EST MOD 30 MIN: CPT | Mod: 25

## 2022-08-11 PROCEDURE — 76811 OB US DETAILED SNGL FETUS: CPT

## 2022-08-11 PROCEDURE — 76817 TRANSVAGINAL US OBSTETRIC: CPT

## 2022-08-15 ENCOUNTER — ASOB RESULT (OUTPATIENT)
Age: 32
End: 2022-08-15

## 2022-08-15 ENCOUNTER — APPOINTMENT (OUTPATIENT)
Dept: ANTEPARTUM | Facility: CLINIC | Age: 32
End: 2022-08-15

## 2022-08-15 PROCEDURE — 76827 ECHO EXAM OF FETAL HEART: CPT

## 2022-08-15 PROCEDURE — 76825 ECHO EXAM OF FETAL HEART: CPT

## 2022-08-15 PROCEDURE — 93325 DOPPLER ECHO COLOR FLOW MAPG: CPT

## 2022-08-17 ENCOUNTER — RESULT REVIEW (OUTPATIENT)
Age: 32
End: 2022-08-17

## 2022-08-17 ENCOUNTER — APPOINTMENT (OUTPATIENT)
Dept: HEMATOLOGY ONCOLOGY | Facility: CLINIC | Age: 32
End: 2022-08-17

## 2022-08-17 ENCOUNTER — APPOINTMENT (OUTPATIENT)
Dept: INFUSION THERAPY | Facility: HOSPITAL | Age: 32
End: 2022-08-17

## 2022-08-17 VITALS
DIASTOLIC BLOOD PRESSURE: 77 MMHG | WEIGHT: 207.23 LBS | HEART RATE: 108 BPM | BODY MASS INDEX: 34.49 KG/M2 | RESPIRATION RATE: 16 BRPM | OXYGEN SATURATION: 97 % | TEMPERATURE: 96.6 F | SYSTOLIC BLOOD PRESSURE: 109 MMHG

## 2022-08-17 LAB
BASOPHILS # BLD AUTO: 0 K/UL — SIGNIFICANT CHANGE UP (ref 0–0.2)
BASOPHILS NFR BLD AUTO: 0 % — SIGNIFICANT CHANGE UP (ref 0–2)
EOSINOPHIL # BLD AUTO: 0.14 K/UL — SIGNIFICANT CHANGE UP (ref 0–0.5)
EOSINOPHIL NFR BLD AUTO: 1 % — SIGNIFICANT CHANGE UP (ref 0–6)
HCT VFR BLD CALC: 37.1 % — SIGNIFICANT CHANGE UP (ref 34.5–45)
HGB BLD-MCNC: 12.6 G/DL — SIGNIFICANT CHANGE UP (ref 11.5–15.5)
LYMPHOCYTES # BLD AUTO: 2.99 K/UL — SIGNIFICANT CHANGE UP (ref 1–3.3)
LYMPHOCYTES # BLD AUTO: 21 % — SIGNIFICANT CHANGE UP (ref 13–44)
MCHC RBC-ENTMCNC: 30.3 PG — SIGNIFICANT CHANGE UP (ref 27–34)
MCHC RBC-ENTMCNC: 34 G/DL — SIGNIFICANT CHANGE UP (ref 32–36)
MCV RBC AUTO: 89.2 FL — SIGNIFICANT CHANGE UP (ref 80–100)
MONOCYTES # BLD AUTO: 1 K/UL — HIGH (ref 0–0.9)
MONOCYTES NFR BLD AUTO: 7 % — SIGNIFICANT CHANGE UP (ref 2–14)
NEUTROPHILS # BLD AUTO: 10.1 K/UL — HIGH (ref 1.8–7.4)
NEUTROPHILS NFR BLD AUTO: 70 % — SIGNIFICANT CHANGE UP (ref 43–77)
NEUTS BAND # BLD: 1 % — SIGNIFICANT CHANGE UP (ref 0–8)
NRBC # BLD: 0 /100 — SIGNIFICANT CHANGE UP (ref 0–0)
NRBC # BLD: SIGNIFICANT CHANGE UP /100 WBCS (ref 0–0)
PLAT MORPH BLD: NORMAL — SIGNIFICANT CHANGE UP
PLATELET # BLD AUTO: 204 K/UL — SIGNIFICANT CHANGE UP (ref 150–400)
RBC # BLD: 4.16 M/UL — SIGNIFICANT CHANGE UP (ref 3.8–5.2)
RBC # FLD: 12.6 % — SIGNIFICANT CHANGE UP (ref 10.3–14.5)
RBC BLD AUTO: NORMAL — SIGNIFICANT CHANGE UP
TOXIC GRANULES BLD QL SMEAR: PRESENT — SIGNIFICANT CHANGE UP
WBC # BLD: 14.23 K/UL — HIGH (ref 3.8–10.5)
WBC # FLD AUTO: 14.23 K/UL — HIGH (ref 3.8–10.5)

## 2022-08-17 PROCEDURE — 99215 OFFICE O/P EST HI 40 MIN: CPT

## 2022-08-18 LAB
ALBUMIN SERPL ELPH-MCNC: 3.5 G/DL — SIGNIFICANT CHANGE UP (ref 3.3–5)
ALP SERPL-CCNC: 70 U/L — SIGNIFICANT CHANGE UP (ref 40–120)
ALT FLD-CCNC: 7 U/L — LOW (ref 10–45)
ANION GAP SERPL CALC-SCNC: 16 MMOL/L — SIGNIFICANT CHANGE UP (ref 5–17)
AST SERPL-CCNC: 14 U/L — SIGNIFICANT CHANGE UP (ref 10–40)
BILIRUB SERPL-MCNC: <0.2 MG/DL — SIGNIFICANT CHANGE UP (ref 0.2–1.2)
BUN SERPL-MCNC: 6 MG/DL — LOW (ref 7–23)
CALCIUM SERPL-MCNC: 7.7 MG/DL — LOW (ref 8.4–10.5)
CHLORIDE SERPL-SCNC: 110 MMOL/L — HIGH (ref 96–108)
CO2 SERPL-SCNC: 14 MMOL/L — LOW (ref 22–31)
CREAT SERPL-MCNC: 0.33 MG/DL — LOW (ref 0.5–1.3)
EGFR: 140 ML/MIN/1.73M2 — SIGNIFICANT CHANGE UP
GLUCOSE SERPL-MCNC: 126 MG/DL — HIGH (ref 70–99)
POTASSIUM SERPL-MCNC: 3.1 MMOL/L — LOW (ref 3.5–5.3)
POTASSIUM SERPL-SCNC: 3.1 MMOL/L — LOW (ref 3.5–5.3)
PROT SERPL-MCNC: 5.4 G/DL — LOW (ref 6–8.3)
SODIUM SERPL-SCNC: 140 MMOL/L — SIGNIFICANT CHANGE UP (ref 135–145)

## 2022-08-19 ENCOUNTER — LABORATORY RESULT (OUTPATIENT)
Age: 32
End: 2022-08-19

## 2022-08-23 LAB
ALBUMIN SERPL ELPH-MCNC: 4 G/DL
ALP BLD-CCNC: 99 U/L
ALT SERPL-CCNC: 9 U/L
ANION GAP SERPL CALC-SCNC: 22 MMOL/L
AST SERPL-CCNC: 15 U/L
BASOPHILS # BLD AUTO: 0 K/UL
BASOPHILS NFR BLD AUTO: 0 %
BILIRUB SERPL-MCNC: 0.2 MG/DL
BUN SERPL-MCNC: 7 MG/DL
CALCIUM SERPL-MCNC: 9.1 MG/DL
CHLORIDE SERPL-SCNC: 103 MMOL/L
CO2 SERPL-SCNC: 17 MMOL/L
CREAT SERPL-MCNC: 0.36 MG/DL
EGFR: 137 ML/MIN/1.73M2
EOSINOPHIL # BLD AUTO: 0 K/UL
EOSINOPHIL NFR BLD AUTO: 0 %
GLUCOSE SERPL-MCNC: NORMAL MG/DL
HCT VFR BLD CALC: 35.2 %
HGB BLD-MCNC: 11.2 G/DL
LYMPHOCYTES # BLD AUTO: 0.75 K/UL
LYMPHOCYTES NFR BLD AUTO: 1.7 %
MAN DIFF?: NORMAL
MCHC RBC-ENTMCNC: 30.4 PG
MCHC RBC-ENTMCNC: 31.8 GM/DL
MCV RBC AUTO: 95.7 FL
MONOCYTES # BLD AUTO: 0.35 K/UL
MONOCYTES NFR BLD AUTO: 0.8 %
NEUTROPHILS # BLD AUTO: 43 K/UL
NEUTROPHILS NFR BLD AUTO: 97.5 %
PLATELET # BLD AUTO: 237 K/UL
POTASSIUM SERPL-SCNC: 3.5 MMOL/L
PROT SERPL-MCNC: 6 G/DL
RBC # BLD: 3.68 M/UL
RBC # FLD: 13.2 %
SODIUM SERPL-SCNC: 141 MMOL/L
WBC # FLD AUTO: 44.1 K/UL

## 2022-08-30 ENCOUNTER — NON-APPOINTMENT (OUTPATIENT)
Age: 32
End: 2022-08-30

## 2022-08-31 ENCOUNTER — APPOINTMENT (OUTPATIENT)
Dept: HEMATOLOGY ONCOLOGY | Facility: CLINIC | Age: 32
End: 2022-08-31

## 2022-09-01 ENCOUNTER — APPOINTMENT (OUTPATIENT)
Dept: RADIATION ONCOLOGY | Facility: CLINIC | Age: 32
End: 2022-09-01

## 2022-09-01 VITALS
HEIGHT: 65 IN | HEART RATE: 95 BPM | SYSTOLIC BLOOD PRESSURE: 116 MMHG | RESPIRATION RATE: 18 BRPM | BODY MASS INDEX: 35.32 KG/M2 | DIASTOLIC BLOOD PRESSURE: 77 MMHG | OXYGEN SATURATION: 100 % | TEMPERATURE: 95.18 F | WEIGHT: 211.97 LBS

## 2022-09-01 PROCEDURE — 99204 OFFICE O/P NEW MOD 45 MIN: CPT | Mod: 25

## 2022-09-02 ENCOUNTER — APPOINTMENT (OUTPATIENT)
Dept: INFUSION THERAPY | Facility: HOSPITAL | Age: 32
End: 2022-09-02

## 2022-09-02 ENCOUNTER — APPOINTMENT (OUTPATIENT)
Dept: HEMATOLOGY ONCOLOGY | Facility: CLINIC | Age: 32
End: 2022-09-02

## 2022-09-02 ENCOUNTER — RESULT REVIEW (OUTPATIENT)
Age: 32
End: 2022-09-02

## 2022-09-02 VITALS
OXYGEN SATURATION: 100 % | TEMPERATURE: 207.32 F | BODY MASS INDEX: 35.11 KG/M2 | DIASTOLIC BLOOD PRESSURE: 77 MMHG | RESPIRATION RATE: 16 BRPM | SYSTOLIC BLOOD PRESSURE: 108 MMHG | HEART RATE: 113 BPM | HEIGHT: 65 IN | WEIGHT: 210.76 LBS

## 2022-09-02 LAB
ALBUMIN SERPL ELPH-MCNC: 4.2 G/DL — SIGNIFICANT CHANGE UP (ref 3.3–5)
ALP SERPL-CCNC: 78 U/L — SIGNIFICANT CHANGE UP (ref 40–120)
ALT FLD-CCNC: 8 U/L — LOW (ref 10–45)
ANION GAP SERPL CALC-SCNC: 16 MMOL/L — SIGNIFICANT CHANGE UP (ref 5–17)
AST SERPL-CCNC: 12 U/L — SIGNIFICANT CHANGE UP (ref 10–40)
BASOPHILS # BLD AUTO: 0 K/UL — SIGNIFICANT CHANGE UP (ref 0–0.2)
BASOPHILS NFR BLD AUTO: 0 % — SIGNIFICANT CHANGE UP (ref 0–2)
BILIRUB SERPL-MCNC: 0.2 MG/DL — SIGNIFICANT CHANGE UP (ref 0.2–1.2)
BUN SERPL-MCNC: 4 MG/DL — LOW (ref 7–23)
CALCIUM SERPL-MCNC: 9.2 MG/DL — SIGNIFICANT CHANGE UP (ref 8.4–10.5)
CHLORIDE SERPL-SCNC: 104 MMOL/L — SIGNIFICANT CHANGE UP (ref 96–108)
CO2 SERPL-SCNC: 18 MMOL/L — LOW (ref 22–31)
CREAT SERPL-MCNC: 0.37 MG/DL — LOW (ref 0.5–1.3)
EGFR: 136 ML/MIN/1.73M2 — SIGNIFICANT CHANGE UP
EOSINOPHIL # BLD AUTO: 0 K/UL — SIGNIFICANT CHANGE UP (ref 0–0.5)
EOSINOPHIL NFR BLD AUTO: 0 % — SIGNIFICANT CHANGE UP (ref 0–6)
GLUCOSE SERPL-MCNC: 61 MG/DL — LOW (ref 70–99)
HCT VFR BLD CALC: 32.6 % — LOW (ref 34.5–45)
HGB BLD-MCNC: 11.3 G/DL — LOW (ref 11.5–15.5)
LYMPHOCYTES # BLD AUTO: 1.76 K/UL — SIGNIFICANT CHANGE UP (ref 1–3.3)
LYMPHOCYTES # BLD AUTO: 11 % — LOW (ref 13–44)
MCHC RBC-ENTMCNC: 31 PG — SIGNIFICANT CHANGE UP (ref 27–34)
MCHC RBC-ENTMCNC: 34.7 G/DL — SIGNIFICANT CHANGE UP (ref 32–36)
MCV RBC AUTO: 89.6 FL — SIGNIFICANT CHANGE UP (ref 80–100)
METAMYELOCYTES # FLD: 3 % — HIGH (ref 0–0)
MONOCYTES # BLD AUTO: 0.32 K/UL — SIGNIFICANT CHANGE UP (ref 0–0.9)
MONOCYTES NFR BLD AUTO: 2 % — SIGNIFICANT CHANGE UP (ref 2–14)
MYELOCYTES NFR BLD: 5 % — HIGH (ref 0–0)
NEUTROPHILS # BLD AUTO: 12.63 K/UL — HIGH (ref 1.8–7.4)
NEUTROPHILS NFR BLD AUTO: 79 % — HIGH (ref 43–77)
NRBC # BLD: 0 /100 — SIGNIFICANT CHANGE UP (ref 0–0)
NRBC # BLD: SIGNIFICANT CHANGE UP /100 WBCS (ref 0–0)
PLAT MORPH BLD: NORMAL — SIGNIFICANT CHANGE UP
PLATELET # BLD AUTO: 190 K/UL — SIGNIFICANT CHANGE UP (ref 150–400)
POTASSIUM SERPL-MCNC: 3.9 MMOL/L — SIGNIFICANT CHANGE UP (ref 3.5–5.3)
POTASSIUM SERPL-SCNC: 3.9 MMOL/L — SIGNIFICANT CHANGE UP (ref 3.5–5.3)
PROT SERPL-MCNC: 6.5 G/DL — SIGNIFICANT CHANGE UP (ref 6–8.3)
RBC # BLD: 3.64 M/UL — LOW (ref 3.8–5.2)
RBC # FLD: 13.6 % — SIGNIFICANT CHANGE UP (ref 10.3–14.5)
RBC BLD AUTO: SIGNIFICANT CHANGE UP
SODIUM SERPL-SCNC: 138 MMOL/L — SIGNIFICANT CHANGE UP (ref 135–145)
TOXIC GRANULES BLD QL SMEAR: PRESENT — SIGNIFICANT CHANGE UP
WBC # BLD: 15.99 K/UL — HIGH (ref 3.8–10.5)
WBC # FLD AUTO: 15.99 K/UL — HIGH (ref 3.8–10.5)

## 2022-09-02 PROCEDURE — 99215 OFFICE O/P EST HI 40 MIN: CPT

## 2022-09-16 ENCOUNTER — APPOINTMENT (OUTPATIENT)
Dept: INFUSION THERAPY | Facility: HOSPITAL | Age: 32
End: 2022-09-16

## 2022-09-16 ENCOUNTER — RESULT REVIEW (OUTPATIENT)
Age: 32
End: 2022-09-16

## 2022-09-16 ENCOUNTER — APPOINTMENT (OUTPATIENT)
Dept: HEMATOLOGY ONCOLOGY | Facility: CLINIC | Age: 32
End: 2022-09-16

## 2022-09-16 VITALS
RESPIRATION RATE: 16 BRPM | OXYGEN SATURATION: 97 % | SYSTOLIC BLOOD PRESSURE: 104 MMHG | BODY MASS INDEX: 35.63 KG/M2 | HEIGHT: 65 IN | DIASTOLIC BLOOD PRESSURE: 73 MMHG | TEMPERATURE: 208.4 F | WEIGHT: 213.85 LBS | HEART RATE: 100 BPM

## 2022-09-16 LAB
ALBUMIN SERPL ELPH-MCNC: 3.8 G/DL — SIGNIFICANT CHANGE UP (ref 3.3–5)
ALP SERPL-CCNC: 81 U/L — SIGNIFICANT CHANGE UP (ref 40–120)
ALT FLD-CCNC: 6 U/L — LOW (ref 10–45)
ANION GAP SERPL CALC-SCNC: 15 MMOL/L — SIGNIFICANT CHANGE UP (ref 5–17)
AST SERPL-CCNC: 12 U/L — SIGNIFICANT CHANGE UP (ref 10–40)
BASOPHILS # BLD AUTO: 0 K/UL — SIGNIFICANT CHANGE UP (ref 0–0.2)
BASOPHILS NFR BLD AUTO: 0 % — SIGNIFICANT CHANGE UP (ref 0–2)
BILIRUB SERPL-MCNC: <0.2 MG/DL — SIGNIFICANT CHANGE UP (ref 0.2–1.2)
BUN SERPL-MCNC: 4 MG/DL — LOW (ref 7–23)
CALCIUM SERPL-MCNC: 9.1 MG/DL — SIGNIFICANT CHANGE UP (ref 8.4–10.5)
CHLORIDE SERPL-SCNC: 103 MMOL/L — SIGNIFICANT CHANGE UP (ref 96–108)
CO2 SERPL-SCNC: 18 MMOL/L — LOW (ref 22–31)
CREAT SERPL-MCNC: 0.31 MG/DL — LOW (ref 0.5–1.3)
EGFR: 142 ML/MIN/1.73M2 — SIGNIFICANT CHANGE UP
EOSINOPHIL # BLD AUTO: 0 K/UL — SIGNIFICANT CHANGE UP (ref 0–0.5)
EOSINOPHIL NFR BLD AUTO: 0 % — SIGNIFICANT CHANGE UP (ref 0–6)
GLUCOSE SERPL-MCNC: 123 MG/DL — HIGH (ref 70–99)
HCT VFR BLD CALC: 32.6 % — LOW (ref 34.5–45)
HGB BLD-MCNC: 11.6 G/DL — SIGNIFICANT CHANGE UP (ref 11.5–15.5)
LYMPHOCYTES # BLD AUTO: 1.83 K/UL — SIGNIFICANT CHANGE UP (ref 1–3.3)
LYMPHOCYTES # BLD AUTO: 12 % — LOW (ref 13–44)
MCHC RBC-ENTMCNC: 31.4 PG — SIGNIFICANT CHANGE UP (ref 27–34)
MCHC RBC-ENTMCNC: 35.6 G/DL — SIGNIFICANT CHANGE UP (ref 32–36)
MCV RBC AUTO: 88.1 FL — SIGNIFICANT CHANGE UP (ref 80–100)
METAMYELOCYTES # FLD: 2 % — HIGH (ref 0–0)
MONOCYTES # BLD AUTO: 0.61 K/UL — SIGNIFICANT CHANGE UP (ref 0–0.9)
MONOCYTES NFR BLD AUTO: 4 % — SIGNIFICANT CHANGE UP (ref 2–14)
MYELOCYTES NFR BLD: 4 % — HIGH (ref 0–0)
NEUTROPHILS # BLD AUTO: 11.9 K/UL — HIGH (ref 1.8–7.4)
NEUTROPHILS NFR BLD AUTO: 78 % — HIGH (ref 43–77)
NRBC # BLD: 0 /100 — SIGNIFICANT CHANGE UP (ref 0–0)
NRBC # BLD: SIGNIFICANT CHANGE UP /100 WBCS (ref 0–0)
PLAT MORPH BLD: NORMAL — SIGNIFICANT CHANGE UP
PLATELET # BLD AUTO: 195 K/UL — SIGNIFICANT CHANGE UP (ref 150–400)
POLYCHROMASIA BLD QL SMEAR: SLIGHT — SIGNIFICANT CHANGE UP
POTASSIUM SERPL-MCNC: 3.5 MMOL/L — SIGNIFICANT CHANGE UP (ref 3.5–5.3)
POTASSIUM SERPL-SCNC: 3.5 MMOL/L — SIGNIFICANT CHANGE UP (ref 3.5–5.3)
PROT SERPL-MCNC: 6 G/DL — SIGNIFICANT CHANGE UP (ref 6–8.3)
RBC # BLD: 3.7 M/UL — LOW (ref 3.8–5.2)
RBC # FLD: 14.9 % — HIGH (ref 10.3–14.5)
RBC BLD AUTO: ABNORMAL
SODIUM SERPL-SCNC: 137 MMOL/L — SIGNIFICANT CHANGE UP (ref 135–145)
TOXIC GRANULES BLD QL SMEAR: PRESENT — SIGNIFICANT CHANGE UP
WBC # BLD: 15.25 K/UL — HIGH (ref 3.8–10.5)
WBC # FLD AUTO: 15.25 K/UL — HIGH (ref 3.8–10.5)

## 2022-09-16 PROCEDURE — 99215 OFFICE O/P EST HI 40 MIN: CPT

## 2022-09-20 ENCOUNTER — ASOB RESULT (OUTPATIENT)
Age: 32
End: 2022-09-20

## 2022-09-20 ENCOUNTER — APPOINTMENT (OUTPATIENT)
Dept: INFUSION THERAPY | Facility: HOSPITAL | Age: 32
End: 2022-09-20

## 2022-09-20 ENCOUNTER — APPOINTMENT (OUTPATIENT)
Dept: ANTEPARTUM | Facility: CLINIC | Age: 32
End: 2022-09-20

## 2022-09-20 ENCOUNTER — OUTPATIENT (OUTPATIENT)
Dept: OUTPATIENT SERVICES | Facility: HOSPITAL | Age: 32
LOS: 1 days | Discharge: HOME | End: 2022-09-20

## 2022-09-20 ENCOUNTER — NON-APPOINTMENT (OUTPATIENT)
Age: 32
End: 2022-09-20

## 2022-09-20 DIAGNOSIS — Z90.11 ACQUIRED ABSENCE OF RIGHT BREAST AND NIPPLE: Chronic | ICD-10-CM

## 2022-09-20 DIAGNOSIS — C50.919 MALIGNANT NEOPLASM OF UNSPECIFIED SITE OF UNSPECIFIED FEMALE BREAST: ICD-10-CM

## 2022-09-20 DIAGNOSIS — Z90.89 ACQUIRED ABSENCE OF OTHER ORGANS: Chronic | ICD-10-CM

## 2022-09-20 PROCEDURE — 76816 OB US FOLLOW-UP PER FETUS: CPT

## 2022-09-21 ENCOUNTER — APPOINTMENT (OUTPATIENT)
Dept: INFUSION THERAPY | Facility: HOSPITAL | Age: 32
End: 2022-09-21

## 2022-09-21 DIAGNOSIS — E86.0 DEHYDRATION: ICD-10-CM

## 2022-09-22 ENCOUNTER — NON-APPOINTMENT (OUTPATIENT)
Age: 32
End: 2022-09-22

## 2022-09-27 ENCOUNTER — RX RENEWAL (OUTPATIENT)
Age: 32
End: 2022-09-27

## 2022-09-30 ENCOUNTER — RESULT REVIEW (OUTPATIENT)
Age: 32
End: 2022-09-30

## 2022-09-30 ENCOUNTER — APPOINTMENT (OUTPATIENT)
Dept: INFUSION THERAPY | Facility: HOSPITAL | Age: 32
End: 2022-09-30

## 2022-09-30 LAB
BASOPHILS # BLD AUTO: 0 K/UL — SIGNIFICANT CHANGE UP (ref 0–0.2)
BASOPHILS NFR BLD AUTO: 0 % — SIGNIFICANT CHANGE UP (ref 0–2)
EOSINOPHIL # BLD AUTO: 0 K/UL — SIGNIFICANT CHANGE UP (ref 0–0.5)
EOSINOPHIL NFR BLD AUTO: 0 % — SIGNIFICANT CHANGE UP (ref 0–6)
HCT VFR BLD CALC: 32 % — LOW (ref 34.5–45)
HGB BLD-MCNC: 11.2 G/DL — LOW (ref 11.5–15.5)
LYMPHOCYTES # BLD AUTO: 1.65 K/UL — SIGNIFICANT CHANGE UP (ref 1–3.3)
LYMPHOCYTES # BLD AUTO: 6 % — LOW (ref 13–44)
MCHC RBC-ENTMCNC: 31.5 PG — SIGNIFICANT CHANGE UP (ref 27–34)
MCHC RBC-ENTMCNC: 35 G/DL — SIGNIFICANT CHANGE UP (ref 32–36)
MCV RBC AUTO: 90.1 FL — SIGNIFICANT CHANGE UP (ref 80–100)
METAMYELOCYTES # FLD: 4 % — HIGH (ref 0–0)
MONOCYTES # BLD AUTO: 0.27 K/UL — SIGNIFICANT CHANGE UP (ref 0–0.9)
MONOCYTES NFR BLD AUTO: 1 % — LOW (ref 2–14)
MYELOCYTES NFR BLD: 3 % — HIGH (ref 0–0)
NEUTROPHILS # BLD AUTO: 23.58 K/UL — HIGH (ref 1.8–7.4)
NEUTROPHILS NFR BLD AUTO: 85 % — HIGH (ref 43–77)
NEUTS BAND # BLD: 1 % — SIGNIFICANT CHANGE UP (ref 0–8)
NRBC # BLD: 0 /100 — SIGNIFICANT CHANGE UP (ref 0–0)
NRBC # BLD: SIGNIFICANT CHANGE UP /100 WBCS (ref 0–0)
PLAT MORPH BLD: NORMAL — SIGNIFICANT CHANGE UP
PLATELET # BLD AUTO: 201 K/UL — SIGNIFICANT CHANGE UP (ref 150–400)
RBC # BLD: 3.55 M/UL — LOW (ref 3.8–5.2)
RBC # FLD: 15.6 % — HIGH (ref 10.3–14.5)
RBC BLD AUTO: SIGNIFICANT CHANGE UP
TOXIC GRANULES BLD QL SMEAR: PRESENT — SIGNIFICANT CHANGE UP
WBC # BLD: 27.42 K/UL — HIGH (ref 3.8–10.5)
WBC # FLD AUTO: 27.42 K/UL — HIGH (ref 3.8–10.5)

## 2022-10-03 ENCOUNTER — NON-APPOINTMENT (OUTPATIENT)
Age: 32
End: 2022-10-03

## 2022-10-03 DIAGNOSIS — R11.2 NAUSEA WITH VOMITING, UNSPECIFIED: ICD-10-CM

## 2022-10-03 DIAGNOSIS — Z51.11 ENCOUNTER FOR ANTINEOPLASTIC CHEMOTHERAPY: ICD-10-CM

## 2022-10-07 ENCOUNTER — RESULT REVIEW (OUTPATIENT)
Age: 32
End: 2022-10-07

## 2022-10-07 ENCOUNTER — APPOINTMENT (OUTPATIENT)
Dept: HEMATOLOGY ONCOLOGY | Facility: CLINIC | Age: 32
End: 2022-10-07

## 2022-10-07 ENCOUNTER — APPOINTMENT (OUTPATIENT)
Dept: INFUSION THERAPY | Facility: HOSPITAL | Age: 32
End: 2022-10-07

## 2022-10-07 VITALS
RESPIRATION RATE: 16 BRPM | DIASTOLIC BLOOD PRESSURE: 80 MMHG | BODY MASS INDEX: 36 KG/M2 | TEMPERATURE: 207.86 F | SYSTOLIC BLOOD PRESSURE: 119 MMHG | HEART RATE: 100 BPM | WEIGHT: 216.05 LBS | HEIGHT: 65 IN | OXYGEN SATURATION: 98 %

## 2022-10-07 DIAGNOSIS — M89.8X9 OTHER SPECIFIED DISORDERS OF BONE, UNSPECIFIED SITE: ICD-10-CM

## 2022-10-07 LAB
ALBUMIN SERPL ELPH-MCNC: 4 G/DL — SIGNIFICANT CHANGE UP (ref 3.3–5)
ALP SERPL-CCNC: 77 U/L — SIGNIFICANT CHANGE UP (ref 40–120)
ALT FLD-CCNC: 15 U/L — SIGNIFICANT CHANGE UP (ref 10–45)
ANION GAP SERPL CALC-SCNC: 16 MMOL/L — SIGNIFICANT CHANGE UP (ref 5–17)
AST SERPL-CCNC: 19 U/L — SIGNIFICANT CHANGE UP (ref 10–40)
BASOPHILS # BLD AUTO: 0.06 K/UL — SIGNIFICANT CHANGE UP (ref 0–0.2)
BASOPHILS NFR BLD AUTO: 0.4 % — SIGNIFICANT CHANGE UP (ref 0–2)
BILIRUB SERPL-MCNC: 0.3 MG/DL — SIGNIFICANT CHANGE UP (ref 0.2–1.2)
BUN SERPL-MCNC: 5 MG/DL — LOW (ref 7–23)
CALCIUM SERPL-MCNC: 9.2 MG/DL — SIGNIFICANT CHANGE UP (ref 8.4–10.5)
CHLORIDE SERPL-SCNC: 102 MMOL/L — SIGNIFICANT CHANGE UP (ref 96–108)
CO2 SERPL-SCNC: 19 MMOL/L — LOW (ref 22–31)
CREAT SERPL-MCNC: 0.36 MG/DL — LOW (ref 0.5–1.3)
EGFR: 137 ML/MIN/1.73M2 — SIGNIFICANT CHANGE UP
EOSINOPHIL # BLD AUTO: 0.04 K/UL — SIGNIFICANT CHANGE UP (ref 0–0.5)
EOSINOPHIL NFR BLD AUTO: 0.3 % — SIGNIFICANT CHANGE UP (ref 0–6)
GLUCOSE SERPL-MCNC: 68 MG/DL — LOW (ref 70–99)
HCT VFR BLD CALC: 29.8 % — LOW (ref 34.5–45)
HGB BLD-MCNC: 10.5 G/DL — LOW (ref 11.5–15.5)
IMM GRANULOCYTES NFR BLD AUTO: 1.9 % — HIGH (ref 0–0.9)
LYMPHOCYTES # BLD AUTO: 1.37 K/UL — SIGNIFICANT CHANGE UP (ref 1–3.3)
LYMPHOCYTES # BLD AUTO: 10.1 % — LOW (ref 13–44)
MCHC RBC-ENTMCNC: 31.7 PG — SIGNIFICANT CHANGE UP (ref 27–34)
MCHC RBC-ENTMCNC: 35.2 G/DL — SIGNIFICANT CHANGE UP (ref 32–36)
MCV RBC AUTO: 90 FL — SIGNIFICANT CHANGE UP (ref 80–100)
MONOCYTES # BLD AUTO: 1.04 K/UL — HIGH (ref 0–0.9)
MONOCYTES NFR BLD AUTO: 7.7 % — SIGNIFICANT CHANGE UP (ref 2–14)
NEUTROPHILS # BLD AUTO: 10.74 K/UL — HIGH (ref 1.8–7.4)
NEUTROPHILS NFR BLD AUTO: 79.6 % — HIGH (ref 43–77)
NRBC # BLD: 0 /100 WBCS — SIGNIFICANT CHANGE UP (ref 0–0)
PLATELET # BLD AUTO: 277 K/UL — SIGNIFICANT CHANGE UP (ref 150–400)
POTASSIUM SERPL-MCNC: 4 MMOL/L — SIGNIFICANT CHANGE UP (ref 3.5–5.3)
POTASSIUM SERPL-SCNC: 4 MMOL/L — SIGNIFICANT CHANGE UP (ref 3.5–5.3)
PROT SERPL-MCNC: 6.1 G/DL — SIGNIFICANT CHANGE UP (ref 6–8.3)
RBC # BLD: 3.31 M/UL — LOW (ref 3.8–5.2)
RBC # FLD: 16.9 % — HIGH (ref 10.3–14.5)
SODIUM SERPL-SCNC: 136 MMOL/L — SIGNIFICANT CHANGE UP (ref 135–145)
WBC # BLD: 13.5 K/UL — HIGH (ref 3.8–10.5)
WBC # FLD AUTO: 13.5 K/UL — HIGH (ref 3.8–10.5)

## 2022-10-07 PROCEDURE — 99215 OFFICE O/P EST HI 40 MIN: CPT

## 2022-10-14 ENCOUNTER — RESULT REVIEW (OUTPATIENT)
Age: 32
End: 2022-10-14

## 2022-10-14 ENCOUNTER — APPOINTMENT (OUTPATIENT)
Dept: INFUSION THERAPY | Facility: HOSPITAL | Age: 32
End: 2022-10-14

## 2022-10-14 LAB
BASOPHILS # BLD AUTO: 0.02 K/UL — SIGNIFICANT CHANGE UP (ref 0–0.2)
BASOPHILS NFR BLD AUTO: 0.2 % — SIGNIFICANT CHANGE UP (ref 0–2)
EOSINOPHIL # BLD AUTO: 0.07 K/UL — SIGNIFICANT CHANGE UP (ref 0–0.5)
EOSINOPHIL NFR BLD AUTO: 0.8 % — SIGNIFICANT CHANGE UP (ref 0–6)
HCT VFR BLD CALC: 30.8 % — LOW (ref 34.5–45)
HGB BLD-MCNC: 10.6 G/DL — LOW (ref 11.5–15.5)
IMM GRANULOCYTES NFR BLD AUTO: 1.1 % — HIGH (ref 0–0.9)
LYMPHOCYTES # BLD AUTO: 1.32 K/UL — SIGNIFICANT CHANGE UP (ref 1–3.3)
LYMPHOCYTES # BLD AUTO: 15.8 % — SIGNIFICANT CHANGE UP (ref 13–44)
MCHC RBC-ENTMCNC: 32 PG — SIGNIFICANT CHANGE UP (ref 27–34)
MCHC RBC-ENTMCNC: 34.4 G/DL — SIGNIFICANT CHANGE UP (ref 32–36)
MCV RBC AUTO: 93.1 FL — SIGNIFICANT CHANGE UP (ref 80–100)
MONOCYTES # BLD AUTO: 0.5 K/UL — SIGNIFICANT CHANGE UP (ref 0–0.9)
MONOCYTES NFR BLD AUTO: 6 % — SIGNIFICANT CHANGE UP (ref 2–14)
NEUTROPHILS # BLD AUTO: 6.34 K/UL — SIGNIFICANT CHANGE UP (ref 1.8–7.4)
NEUTROPHILS NFR BLD AUTO: 76.1 % — SIGNIFICANT CHANGE UP (ref 43–77)
NRBC # BLD: 0 /100 WBCS — SIGNIFICANT CHANGE UP (ref 0–0)
PLATELET # BLD AUTO: 240 K/UL — SIGNIFICANT CHANGE UP (ref 150–400)
RBC # BLD: 3.31 M/UL — LOW (ref 3.8–5.2)
RBC # FLD: 17.2 % — HIGH (ref 10.3–14.5)
WBC # BLD: 8.34 K/UL — SIGNIFICANT CHANGE UP (ref 3.8–10.5)
WBC # FLD AUTO: 8.34 K/UL — SIGNIFICANT CHANGE UP (ref 3.8–10.5)

## 2022-10-18 ENCOUNTER — APPOINTMENT (OUTPATIENT)
Dept: ANTEPARTUM | Facility: CLINIC | Age: 32
End: 2022-10-18

## 2022-10-18 ENCOUNTER — ASOB RESULT (OUTPATIENT)
Age: 32
End: 2022-10-18

## 2022-10-18 ENCOUNTER — APPOINTMENT (OUTPATIENT)
Dept: MATERNAL FETAL MEDICINE | Facility: CLINIC | Age: 32
End: 2022-10-18

## 2022-10-18 PROCEDURE — 76819 FETAL BIOPHYS PROFIL W/O NST: CPT | Mod: 59

## 2022-10-18 PROCEDURE — 76816 OB US FOLLOW-UP PER FETUS: CPT

## 2022-10-19 LAB
APPEARANCE: CLEAR
BACTERIA: ABNORMAL
BILIRUBIN URINE: NEGATIVE
BLOOD URINE: NEGATIVE
COLOR: NORMAL
GLUCOSE QUALITATIVE U: NEGATIVE
HYALINE CASTS: 4 /LPF
KETONES URINE: NEGATIVE
LEUKOCYTE ESTERASE URINE: ABNORMAL
MICROSCOPIC-UA: NORMAL
NITRITE URINE: NEGATIVE
PH URINE: 7.5
PROTEIN URINE: NEGATIVE
RED BLOOD CELLS URINE: 7 /HPF
SPECIFIC GRAVITY URINE: 1.01
SQUAMOUS EPITHELIAL CELLS: 4 /HPF
UROBILINOGEN URINE: NORMAL
WHITE BLOOD CELLS URINE: 3 /HPF

## 2022-10-20 LAB — BACTERIA UR CULT: NORMAL

## 2022-10-21 ENCOUNTER — APPOINTMENT (OUTPATIENT)
Dept: HEMATOLOGY ONCOLOGY | Facility: CLINIC | Age: 32
End: 2022-10-21

## 2022-10-21 ENCOUNTER — RESULT REVIEW (OUTPATIENT)
Age: 32
End: 2022-10-21

## 2022-10-21 ENCOUNTER — APPOINTMENT (OUTPATIENT)
Dept: INFUSION THERAPY | Facility: HOSPITAL | Age: 32
End: 2022-10-21

## 2022-10-21 VITALS
WEIGHT: 218.26 LBS | TEMPERATURE: 97.2 F | BODY MASS INDEX: 36.32 KG/M2 | HEART RATE: 105 BPM | OXYGEN SATURATION: 99 % | SYSTOLIC BLOOD PRESSURE: 111 MMHG | RESPIRATION RATE: 16 BRPM | DIASTOLIC BLOOD PRESSURE: 71 MMHG

## 2022-10-21 PROBLEM — M89.8X9 BONE PAIN DUE TO G-CSF: Status: ACTIVE | Noted: 2022-08-05

## 2022-10-21 LAB
ALBUMIN SERPL ELPH-MCNC: 3.8 G/DL — SIGNIFICANT CHANGE UP (ref 3.3–5)
ALP SERPL-CCNC: 82 U/L — SIGNIFICANT CHANGE UP (ref 40–120)
ALT FLD-CCNC: 15 U/L — SIGNIFICANT CHANGE UP (ref 10–45)
ANION GAP SERPL CALC-SCNC: 14 MMOL/L — SIGNIFICANT CHANGE UP (ref 5–17)
AST SERPL-CCNC: 16 U/L — SIGNIFICANT CHANGE UP (ref 10–40)
BASOPHILS # BLD AUTO: 0.03 K/UL — SIGNIFICANT CHANGE UP (ref 0–0.2)
BASOPHILS NFR BLD AUTO: 0.3 % — SIGNIFICANT CHANGE UP (ref 0–2)
BILIRUB SERPL-MCNC: 0.2 MG/DL — SIGNIFICANT CHANGE UP (ref 0.2–1.2)
BUN SERPL-MCNC: 5 MG/DL — LOW (ref 7–23)
CALCIUM SERPL-MCNC: 9.6 MG/DL — SIGNIFICANT CHANGE UP (ref 8.4–10.5)
CHLORIDE SERPL-SCNC: 104 MMOL/L — SIGNIFICANT CHANGE UP (ref 96–108)
CO2 SERPL-SCNC: 20 MMOL/L — LOW (ref 22–31)
CREAT SERPL-MCNC: 0.38 MG/DL — LOW (ref 0.5–1.3)
EGFR: 135 ML/MIN/1.73M2 — SIGNIFICANT CHANGE UP
EOSINOPHIL # BLD AUTO: 0.18 K/UL — SIGNIFICANT CHANGE UP (ref 0–0.5)
EOSINOPHIL NFR BLD AUTO: 1.9 % — SIGNIFICANT CHANGE UP (ref 0–6)
GLUCOSE SERPL-MCNC: 93 MG/DL — SIGNIFICANT CHANGE UP (ref 70–99)
HCT VFR BLD CALC: 33.3 % — LOW (ref 34.5–45)
HGB BLD-MCNC: 11.3 G/DL — LOW (ref 11.5–15.5)
IMM GRANULOCYTES NFR BLD AUTO: 1.1 % — HIGH (ref 0–0.9)
LYMPHOCYTES # BLD AUTO: 1.44 K/UL — SIGNIFICANT CHANGE UP (ref 1–3.3)
LYMPHOCYTES # BLD AUTO: 15 % — SIGNIFICANT CHANGE UP (ref 13–44)
MCHC RBC-ENTMCNC: 31.7 PG — SIGNIFICANT CHANGE UP (ref 27–34)
MCHC RBC-ENTMCNC: 33.9 G/DL — SIGNIFICANT CHANGE UP (ref 32–36)
MCV RBC AUTO: 93.5 FL — SIGNIFICANT CHANGE UP (ref 80–100)
MONOCYTES # BLD AUTO: 0.66 K/UL — SIGNIFICANT CHANGE UP (ref 0–0.9)
MONOCYTES NFR BLD AUTO: 6.9 % — SIGNIFICANT CHANGE UP (ref 2–14)
NEUTROPHILS # BLD AUTO: 7.2 K/UL — SIGNIFICANT CHANGE UP (ref 1.8–7.4)
NEUTROPHILS NFR BLD AUTO: 74.8 % — SIGNIFICANT CHANGE UP (ref 43–77)
NRBC # BLD: 0 /100 WBCS — SIGNIFICANT CHANGE UP (ref 0–0)
PLATELET # BLD AUTO: 285 K/UL — SIGNIFICANT CHANGE UP (ref 150–400)
POTASSIUM SERPL-MCNC: 4 MMOL/L — SIGNIFICANT CHANGE UP (ref 3.5–5.3)
POTASSIUM SERPL-SCNC: 4 MMOL/L — SIGNIFICANT CHANGE UP (ref 3.5–5.3)
PROT SERPL-MCNC: 5.9 G/DL — LOW (ref 6–8.3)
RBC # BLD: 3.56 M/UL — LOW (ref 3.8–5.2)
RBC # FLD: 16.8 % — HIGH (ref 10.3–14.5)
SODIUM SERPL-SCNC: 137 MMOL/L — SIGNIFICANT CHANGE UP (ref 135–145)
WBC # BLD: 9.62 K/UL — SIGNIFICANT CHANGE UP (ref 3.8–10.5)
WBC # FLD AUTO: 9.62 K/UL — SIGNIFICANT CHANGE UP (ref 3.8–10.5)

## 2022-10-21 PROCEDURE — 99215 OFFICE O/P EST HI 40 MIN: CPT

## 2022-10-28 ENCOUNTER — RESULT REVIEW (OUTPATIENT)
Age: 32
End: 2022-10-28

## 2022-10-28 ENCOUNTER — APPOINTMENT (OUTPATIENT)
Dept: INFUSION THERAPY | Facility: HOSPITAL | Age: 32
End: 2022-10-28

## 2022-10-28 ENCOUNTER — NON-APPOINTMENT (OUTPATIENT)
Age: 32
End: 2022-10-28

## 2022-10-28 LAB
ALBUMIN SERPL ELPH-MCNC: 3.8 G/DL — SIGNIFICANT CHANGE UP (ref 3.3–5)
ALP SERPL-CCNC: 92 U/L — SIGNIFICANT CHANGE UP (ref 40–120)
ALT FLD-CCNC: 12 U/L — SIGNIFICANT CHANGE UP (ref 10–45)
ANION GAP SERPL CALC-SCNC: 15 MMOL/L — SIGNIFICANT CHANGE UP (ref 5–17)
AST SERPL-CCNC: 17 U/L — SIGNIFICANT CHANGE UP (ref 10–40)
BASOPHILS # BLD AUTO: 0.05 K/UL — SIGNIFICANT CHANGE UP (ref 0–0.2)
BASOPHILS NFR BLD AUTO: 0.4 % — SIGNIFICANT CHANGE UP (ref 0–2)
BILIRUB SERPL-MCNC: 0.3 MG/DL — SIGNIFICANT CHANGE UP (ref 0.2–1.2)
BUN SERPL-MCNC: 4 MG/DL — LOW (ref 7–23)
CALCIUM SERPL-MCNC: 9.7 MG/DL — SIGNIFICANT CHANGE UP (ref 8.4–10.5)
CHLORIDE SERPL-SCNC: 103 MMOL/L — SIGNIFICANT CHANGE UP (ref 96–108)
CO2 SERPL-SCNC: 20 MMOL/L — LOW (ref 22–31)
CREAT SERPL-MCNC: 0.34 MG/DL — LOW (ref 0.5–1.3)
EGFR: 138 ML/MIN/1.73M2 — SIGNIFICANT CHANGE UP
EOSINOPHIL # BLD AUTO: 0.23 K/UL — SIGNIFICANT CHANGE UP (ref 0–0.5)
EOSINOPHIL NFR BLD AUTO: 1.8 % — SIGNIFICANT CHANGE UP (ref 0–6)
GLUCOSE SERPL-MCNC: 90 MG/DL — SIGNIFICANT CHANGE UP (ref 70–99)
HCT VFR BLD CALC: 35.1 % — SIGNIFICANT CHANGE UP (ref 34.5–45)
HGB BLD-MCNC: 12 G/DL — SIGNIFICANT CHANGE UP (ref 11.5–15.5)
IMM GRANULOCYTES NFR BLD AUTO: 0.9 % — SIGNIFICANT CHANGE UP (ref 0–0.9)
LYMPHOCYTES # BLD AUTO: 1.67 K/UL — SIGNIFICANT CHANGE UP (ref 1–3.3)
LYMPHOCYTES # BLD AUTO: 13 % — SIGNIFICANT CHANGE UP (ref 13–44)
MCHC RBC-ENTMCNC: 31.6 PG — SIGNIFICANT CHANGE UP (ref 27–34)
MCHC RBC-ENTMCNC: 34.2 G/DL — SIGNIFICANT CHANGE UP (ref 32–36)
MCV RBC AUTO: 92.4 FL — SIGNIFICANT CHANGE UP (ref 80–100)
MONOCYTES # BLD AUTO: 0.84 K/UL — SIGNIFICANT CHANGE UP (ref 0–0.9)
MONOCYTES NFR BLD AUTO: 6.6 % — SIGNIFICANT CHANGE UP (ref 2–14)
NEUTROPHILS # BLD AUTO: 9.9 K/UL — HIGH (ref 1.8–7.4)
NEUTROPHILS NFR BLD AUTO: 77.3 % — HIGH (ref 43–77)
NRBC # BLD: 0 /100 WBCS — SIGNIFICANT CHANGE UP (ref 0–0)
PLATELET # BLD AUTO: 285 K/UL — SIGNIFICANT CHANGE UP (ref 150–400)
POTASSIUM SERPL-MCNC: 3.8 MMOL/L — SIGNIFICANT CHANGE UP (ref 3.5–5.3)
POTASSIUM SERPL-SCNC: 3.8 MMOL/L — SIGNIFICANT CHANGE UP (ref 3.5–5.3)
PROT SERPL-MCNC: 6.4 G/DL — SIGNIFICANT CHANGE UP (ref 6–8.3)
RBC # BLD: 3.8 M/UL — SIGNIFICANT CHANGE UP (ref 3.8–5.2)
RBC # FLD: 15.9 % — HIGH (ref 10.3–14.5)
SODIUM SERPL-SCNC: 137 MMOL/L — SIGNIFICANT CHANGE UP (ref 135–145)
WBC # BLD: 12.81 K/UL — HIGH (ref 3.8–10.5)
WBC # FLD AUTO: 12.81 K/UL — HIGH (ref 3.8–10.5)

## 2022-11-04 ENCOUNTER — APPOINTMENT (OUTPATIENT)
Dept: HEMATOLOGY ONCOLOGY | Facility: CLINIC | Age: 32
End: 2022-11-04

## 2022-11-04 ENCOUNTER — RESULT REVIEW (OUTPATIENT)
Age: 32
End: 2022-11-04

## 2022-11-04 ENCOUNTER — APPOINTMENT (OUTPATIENT)
Dept: INFUSION THERAPY | Facility: HOSPITAL | Age: 32
End: 2022-11-04

## 2022-11-04 VITALS
SYSTOLIC BLOOD PRESSURE: 107 MMHG | TEMPERATURE: 96.7 F | HEART RATE: 107 BPM | RESPIRATION RATE: 16 BRPM | BODY MASS INDEX: 36.69 KG/M2 | OXYGEN SATURATION: 99 % | DIASTOLIC BLOOD PRESSURE: 75 MMHG | WEIGHT: 220.46 LBS

## 2022-11-04 DIAGNOSIS — Z34.90 ENCOUNTER FOR SUPERVISION OF NORMAL PREGNANCY, UNSPECIFIED, UNSPECIFIED TRIMESTER: ICD-10-CM

## 2022-11-04 LAB
BASOPHILS # BLD AUTO: 0.05 K/UL — SIGNIFICANT CHANGE UP (ref 0–0.2)
BASOPHILS NFR BLD AUTO: 0.6 % — SIGNIFICANT CHANGE UP (ref 0–2)
EOSINOPHIL # BLD AUTO: 0.12 K/UL — SIGNIFICANT CHANGE UP (ref 0–0.5)
EOSINOPHIL NFR BLD AUTO: 1.4 % — SIGNIFICANT CHANGE UP (ref 0–6)
HCT VFR BLD CALC: 31.8 % — LOW (ref 34.5–45)
HGB BLD-MCNC: 11.2 G/DL — LOW (ref 11.5–15.5)
IMM GRANULOCYTES NFR BLD AUTO: 1.7 % — HIGH (ref 0–0.9)
LYMPHOCYTES # BLD AUTO: 1.41 K/UL — SIGNIFICANT CHANGE UP (ref 1–3.3)
LYMPHOCYTES # BLD AUTO: 16.4 % — SIGNIFICANT CHANGE UP (ref 13–44)
MCHC RBC-ENTMCNC: 32.3 PG — SIGNIFICANT CHANGE UP (ref 27–34)
MCHC RBC-ENTMCNC: 35.2 G/DL — SIGNIFICANT CHANGE UP (ref 32–36)
MCV RBC AUTO: 91.6 FL — SIGNIFICANT CHANGE UP (ref 80–100)
MONOCYTES # BLD AUTO: 0.53 K/UL — SIGNIFICANT CHANGE UP (ref 0–0.9)
MONOCYTES NFR BLD AUTO: 6.1 % — SIGNIFICANT CHANGE UP (ref 2–14)
NEUTROPHILS # BLD AUTO: 6.36 K/UL — SIGNIFICANT CHANGE UP (ref 1.8–7.4)
NEUTROPHILS NFR BLD AUTO: 73.8 % — SIGNIFICANT CHANGE UP (ref 43–77)
NRBC # BLD: 0 /100 WBCS — SIGNIFICANT CHANGE UP (ref 0–0)
PLATELET # BLD AUTO: 266 K/UL — SIGNIFICANT CHANGE UP (ref 150–400)
RBC # BLD: 3.47 M/UL — LOW (ref 3.8–5.2)
RBC # FLD: 15 % — HIGH (ref 10.3–14.5)
WBC # BLD: 8.62 K/UL — SIGNIFICANT CHANGE UP (ref 3.8–10.5)
WBC # FLD AUTO: 8.62 K/UL — SIGNIFICANT CHANGE UP (ref 3.8–10.5)

## 2022-11-04 PROCEDURE — 99215 OFFICE O/P EST HI 40 MIN: CPT

## 2022-11-21 ENCOUNTER — APPOINTMENT (OUTPATIENT)
Dept: MATERNAL FETAL MEDICINE | Facility: CLINIC | Age: 32
End: 2022-11-21

## 2022-11-21 ENCOUNTER — ASOB RESULT (OUTPATIENT)
Age: 32
End: 2022-11-21

## 2022-11-21 ENCOUNTER — APPOINTMENT (OUTPATIENT)
Dept: ANTEPARTUM | Facility: CLINIC | Age: 32
End: 2022-11-21

## 2022-11-21 VITALS
HEIGHT: 65 IN | SYSTOLIC BLOOD PRESSURE: 108 MMHG | OXYGEN SATURATION: 99 % | BODY MASS INDEX: 37.32 KG/M2 | TEMPERATURE: 97.6 F | DIASTOLIC BLOOD PRESSURE: 77 MMHG | HEART RATE: 101 BPM | WEIGHT: 224 LBS

## 2022-11-21 PROCEDURE — 76818 FETAL BIOPHYS PROFILE W/NST: CPT | Mod: 59

## 2022-11-21 PROCEDURE — 76816 OB US FOLLOW-UP PER FETUS: CPT

## 2022-11-21 PROCEDURE — 99214 OFFICE O/P EST MOD 30 MIN: CPT | Mod: 25

## 2022-11-22 ENCOUNTER — APPOINTMENT (OUTPATIENT)
Dept: OTHER | Facility: CLINIC | Age: 32
End: 2022-11-22
Payer: COMMERCIAL

## 2022-11-22 PROCEDURE — ZZZZZ: CPT | Mod: 1L

## 2022-12-07 ENCOUNTER — OUTPATIENT (OUTPATIENT)
Dept: OUTPATIENT SERVICES | Facility: HOSPITAL | Age: 32
LOS: 1 days | End: 2022-12-07
Payer: COMMERCIAL

## 2022-12-07 DIAGNOSIS — Z11.52 ENCOUNTER FOR SCREENING FOR COVID-19: ICD-10-CM

## 2022-12-07 DIAGNOSIS — Z90.89 ACQUIRED ABSENCE OF OTHER ORGANS: Chronic | ICD-10-CM

## 2022-12-07 DIAGNOSIS — Z90.11 ACQUIRED ABSENCE OF RIGHT BREAST AND NIPPLE: Chronic | ICD-10-CM

## 2022-12-07 DIAGNOSIS — O9A.119: ICD-10-CM

## 2022-12-07 LAB
SARS-COV-2 RNA SPEC QL NAA+PROBE: SIGNIFICANT CHANGE UP

## 2022-12-07 PROCEDURE — U0003: CPT

## 2022-12-07 PROCEDURE — U0005: CPT

## 2022-12-07 PROCEDURE — C9803: CPT

## 2022-12-09 ENCOUNTER — INPATIENT (INPATIENT)
Facility: HOSPITAL | Age: 32
LOS: 0 days | Discharge: HOME | End: 2022-12-10
Attending: OBSTETRICS & GYNECOLOGY | Admitting: OBSTETRICS & GYNECOLOGY
Payer: COMMERCIAL

## 2022-12-09 ENCOUNTER — TRANSCRIPTION ENCOUNTER (OUTPATIENT)
Age: 32
End: 2022-12-09

## 2022-12-09 VITALS — OXYGEN SATURATION: 99 % | HEART RATE: 88 BPM

## 2022-12-09 DIAGNOSIS — Z90.89 ACQUIRED ABSENCE OF OTHER ORGANS: Chronic | ICD-10-CM

## 2022-12-09 DIAGNOSIS — O9A.119: ICD-10-CM

## 2022-12-09 DIAGNOSIS — Z90.11 ACQUIRED ABSENCE OF RIGHT BREAST AND NIPPLE: Chronic | ICD-10-CM

## 2022-12-09 LAB
ALBUMIN SERPL ELPH-MCNC: 3.4 G/DL — SIGNIFICANT CHANGE UP (ref 3.3–5)
ALP SERPL-CCNC: 125 U/L — HIGH (ref 40–120)
ALT FLD-CCNC: 12 U/L — SIGNIFICANT CHANGE UP (ref 10–45)
ANION GAP SERPL CALC-SCNC: 12 MMOL/L — SIGNIFICANT CHANGE UP (ref 5–17)
APPEARANCE UR: ABNORMAL
APTT BLD: 27.1 SEC — LOW (ref 27.5–35.5)
AST SERPL-CCNC: 17 U/L — SIGNIFICANT CHANGE UP (ref 10–40)
BASOPHILS # BLD AUTO: 0.01 K/UL — SIGNIFICANT CHANGE UP (ref 0–0.2)
BASOPHILS NFR BLD AUTO: 0.1 % — SIGNIFICANT CHANGE UP (ref 0–2)
BILIRUB SERPL-MCNC: 0.2 MG/DL — SIGNIFICANT CHANGE UP (ref 0.2–1.2)
BILIRUB UR-MCNC: NEGATIVE — SIGNIFICANT CHANGE UP
BUN SERPL-MCNC: 7 MG/DL — SIGNIFICANT CHANGE UP (ref 7–23)
CALCIUM SERPL-MCNC: 9.6 MG/DL — SIGNIFICANT CHANGE UP (ref 8.4–10.5)
CHLORIDE SERPL-SCNC: 104 MMOL/L — SIGNIFICANT CHANGE UP (ref 96–108)
CO2 SERPL-SCNC: 21 MMOL/L — LOW (ref 22–31)
COLOR SPEC: SIGNIFICANT CHANGE UP
COVID-19 SPIKE DOMAIN AB INTERP: POSITIVE
COVID-19 SPIKE DOMAIN ANTIBODY RESULT: >250 U/ML — HIGH
CREAT ?TM UR-MCNC: 27 MG/DL — SIGNIFICANT CHANGE UP
CREAT SERPL-MCNC: 0.47 MG/DL — LOW (ref 0.5–1.3)
DIFF PNL FLD: NEGATIVE — SIGNIFICANT CHANGE UP
EGFR: 128 ML/MIN/1.73M2 — SIGNIFICANT CHANGE UP
EOSINOPHIL # BLD AUTO: 0.18 K/UL — SIGNIFICANT CHANGE UP (ref 0–0.5)
EOSINOPHIL NFR BLD AUTO: 1.9 % — SIGNIFICANT CHANGE UP (ref 0–6)
GLUCOSE SERPL-MCNC: 107 MG/DL — HIGH (ref 70–99)
GLUCOSE UR QL: NEGATIVE — SIGNIFICANT CHANGE UP
HCT VFR BLD CALC: 34.4 % — LOW (ref 34.5–45)
HGB BLD-MCNC: 11.2 G/DL — LOW (ref 11.5–15.5)
IMM GRANULOCYTES NFR BLD AUTO: 0.5 % — SIGNIFICANT CHANGE UP (ref 0–0.9)
INR BLD: 1.02 RATIO — SIGNIFICANT CHANGE UP (ref 0.88–1.16)
KETONES UR-MCNC: NEGATIVE — SIGNIFICANT CHANGE UP
LDH SERPL L TO P-CCNC: 181 U/L — SIGNIFICANT CHANGE UP (ref 50–242)
LEUKOCYTE ESTERASE UR-ACNC: ABNORMAL
LYMPHOCYTES # BLD AUTO: 1.58 K/UL — SIGNIFICANT CHANGE UP (ref 1–3.3)
LYMPHOCYTES # BLD AUTO: 17.1 % — SIGNIFICANT CHANGE UP (ref 13–44)
MCHC RBC-ENTMCNC: 29.2 PG — SIGNIFICANT CHANGE UP (ref 27–34)
MCHC RBC-ENTMCNC: 32.6 GM/DL — SIGNIFICANT CHANGE UP (ref 32–36)
MCV RBC AUTO: 89.8 FL — SIGNIFICANT CHANGE UP (ref 80–100)
MONOCYTES # BLD AUTO: 0.63 K/UL — SIGNIFICANT CHANGE UP (ref 0–0.9)
MONOCYTES NFR BLD AUTO: 6.8 % — SIGNIFICANT CHANGE UP (ref 2–14)
NEUTROPHILS # BLD AUTO: 6.81 K/UL — SIGNIFICANT CHANGE UP (ref 1.8–7.4)
NEUTROPHILS NFR BLD AUTO: 73.6 % — SIGNIFICANT CHANGE UP (ref 43–77)
NITRITE UR-MCNC: NEGATIVE — SIGNIFICANT CHANGE UP
NRBC # BLD: 0 /100 WBCS — SIGNIFICANT CHANGE UP (ref 0–0)
PH UR: 6.5 — SIGNIFICANT CHANGE UP (ref 5–8)
PLATELET # BLD AUTO: 259 K/UL — SIGNIFICANT CHANGE UP (ref 150–400)
POTASSIUM SERPL-MCNC: 3.7 MMOL/L — SIGNIFICANT CHANGE UP (ref 3.5–5.3)
POTASSIUM SERPL-SCNC: 3.7 MMOL/L — SIGNIFICANT CHANGE UP (ref 3.5–5.3)
PROT ?TM UR-MCNC: <7 MG/DL — SIGNIFICANT CHANGE UP (ref 0–12)
PROT SERPL-MCNC: 6.2 G/DL — SIGNIFICANT CHANGE UP (ref 6–8.3)
PROT UR-MCNC: NEGATIVE — SIGNIFICANT CHANGE UP
PROT/CREAT UR-RTO: SIGNIFICANT CHANGE UP (ref 0–0.2)
PROTHROM AB SERPL-ACNC: 11.7 SEC — SIGNIFICANT CHANGE UP (ref 10.5–13.4)
RBC # BLD: 3.83 M/UL — SIGNIFICANT CHANGE UP (ref 3.8–5.2)
RBC # FLD: 13.4 % — SIGNIFICANT CHANGE UP (ref 10.3–14.5)
SARS-COV-2 IGG+IGM SERPL QL IA: >250 U/ML — HIGH
SARS-COV-2 IGG+IGM SERPL QL IA: POSITIVE
SODIUM SERPL-SCNC: 137 MMOL/L — SIGNIFICANT CHANGE UP (ref 135–145)
SP GR SPEC: 1.01 — LOW (ref 1.01–1.02)
T PALLIDUM AB TITR SER: NEGATIVE — SIGNIFICANT CHANGE UP
URATE SERPL-MCNC: 4.9 MG/DL — SIGNIFICANT CHANGE UP (ref 2.5–7)
UROBILINOGEN FLD QL: NEGATIVE — SIGNIFICANT CHANGE UP
WBC # BLD: 9.26 K/UL — SIGNIFICANT CHANGE UP (ref 3.8–10.5)
WBC # FLD AUTO: 9.26 K/UL — SIGNIFICANT CHANGE UP (ref 3.8–10.5)

## 2022-12-09 PROCEDURE — 74177 CT ABD & PELVIS W/CONTRAST: CPT | Mod: 26

## 2022-12-09 PROCEDURE — 88307 TISSUE EXAM BY PATHOLOGIST: CPT | Mod: 26

## 2022-12-09 PROCEDURE — 71260 CT THORAX DX C+: CPT | Mod: 26

## 2022-12-09 PROCEDURE — 99223 1ST HOSP IP/OBS HIGH 75: CPT | Mod: GC

## 2022-12-09 RX ORDER — CEFAZOLIN SODIUM 1 G
1000 VIAL (EA) INJECTION EVERY 8 HOURS
Refills: 0 | Status: COMPLETED | OUTPATIENT
Start: 2022-12-09 | End: 2022-12-10

## 2022-12-09 RX ORDER — IBUPROFEN 200 MG
600 TABLET ORAL EVERY 6 HOURS
Refills: 0 | Status: COMPLETED | OUTPATIENT
Start: 2022-12-09 | End: 2023-11-07

## 2022-12-09 RX ORDER — IBUPROFEN 200 MG
600 TABLET ORAL EVERY 6 HOURS
Refills: 0 | Status: DISCONTINUED | OUTPATIENT
Start: 2022-12-09 | End: 2022-12-10

## 2022-12-09 RX ORDER — OXYTOCIN 10 UNIT/ML
4 VIAL (ML) INJECTION
Qty: 30 | Refills: 0 | Status: DISCONTINUED | OUTPATIENT
Start: 2022-12-09 | End: 2022-12-09

## 2022-12-09 RX ORDER — OXYTOCIN 10 UNIT/ML
10 VIAL (ML) INJECTION ONCE
Refills: 0 | Status: COMPLETED | OUTPATIENT
Start: 2022-12-09 | End: 2022-12-09

## 2022-12-09 RX ORDER — KETOROLAC TROMETHAMINE 30 MG/ML
30 SYRINGE (ML) INJECTION ONCE
Refills: 0 | Status: DISCONTINUED | OUTPATIENT
Start: 2022-12-09 | End: 2022-12-09

## 2022-12-09 RX ORDER — OXYCODONE HYDROCHLORIDE 5 MG/1
5 TABLET ORAL ONCE
Refills: 0 | Status: DISCONTINUED | OUTPATIENT
Start: 2022-12-09 | End: 2022-12-10

## 2022-12-09 RX ORDER — OXYTOCIN 10 UNIT/ML
333.33 VIAL (ML) INJECTION
Qty: 20 | Refills: 0 | Status: COMPLETED | OUTPATIENT
Start: 2022-12-09 | End: 2022-12-09

## 2022-12-09 RX ORDER — OXYCODONE HYDROCHLORIDE 5 MG/1
5 TABLET ORAL
Refills: 0 | Status: DISCONTINUED | OUTPATIENT
Start: 2022-12-09 | End: 2022-12-10

## 2022-12-09 RX ORDER — SODIUM CHLORIDE 9 MG/ML
1000 INJECTION, SOLUTION INTRAVENOUS
Refills: 0 | Status: DISCONTINUED | OUTPATIENT
Start: 2022-12-09 | End: 2022-12-09

## 2022-12-09 RX ORDER — OXYTOCIN 10 UNIT/ML
41.67 VIAL (ML) INJECTION
Qty: 20 | Refills: 0 | Status: DISCONTINUED | OUTPATIENT
Start: 2022-12-09 | End: 2022-12-10

## 2022-12-09 RX ORDER — AER TRAVELER 0.5 G/1
1 SOLUTION RECTAL; TOPICAL EVERY 4 HOURS
Refills: 0 | Status: DISCONTINUED | OUTPATIENT
Start: 2022-12-09 | End: 2022-12-10

## 2022-12-09 RX ORDER — DIBUCAINE 1 %
1 OINTMENT (GRAM) RECTAL EVERY 6 HOURS
Refills: 0 | Status: DISCONTINUED | OUTPATIENT
Start: 2022-12-09 | End: 2022-12-10

## 2022-12-09 RX ORDER — MAGNESIUM HYDROXIDE 400 MG/1
30 TABLET, CHEWABLE ORAL
Refills: 0 | Status: DISCONTINUED | OUTPATIENT
Start: 2022-12-09 | End: 2022-12-10

## 2022-12-09 RX ORDER — BENZOCAINE 10 %
1 GEL (GRAM) MUCOUS MEMBRANE EVERY 6 HOURS
Refills: 0 | Status: DISCONTINUED | OUTPATIENT
Start: 2022-12-09 | End: 2022-12-10

## 2022-12-09 RX ORDER — ACETAMINOPHEN 500 MG
975 TABLET ORAL
Refills: 0 | Status: DISCONTINUED | OUTPATIENT
Start: 2022-12-09 | End: 2022-12-10

## 2022-12-09 RX ORDER — SODIUM CHLORIDE 9 MG/ML
3 INJECTION INTRAMUSCULAR; INTRAVENOUS; SUBCUTANEOUS EVERY 8 HOURS
Refills: 0 | Status: DISCONTINUED | OUTPATIENT
Start: 2022-12-09 | End: 2022-12-10

## 2022-12-09 RX ORDER — SODIUM CHLORIDE 0.65 %
1 AEROSOL, SPRAY (ML) NASAL EVERY 4 HOURS
Refills: 0 | Status: DISCONTINUED | OUTPATIENT
Start: 2022-12-09 | End: 2022-12-10

## 2022-12-09 RX ORDER — CHLORHEXIDINE GLUCONATE 213 G/1000ML
1 SOLUTION TOPICAL ONCE
Refills: 0 | Status: DISCONTINUED | OUTPATIENT
Start: 2022-12-09 | End: 2022-12-09

## 2022-12-09 RX ORDER — HYDROCORTISONE 1 %
1 OINTMENT (GRAM) TOPICAL EVERY 6 HOURS
Refills: 0 | Status: DISCONTINUED | OUTPATIENT
Start: 2022-12-09 | End: 2022-12-10

## 2022-12-09 RX ORDER — DIPHENHYDRAMINE HCL 50 MG
25 CAPSULE ORAL EVERY 6 HOURS
Refills: 0 | Status: DISCONTINUED | OUTPATIENT
Start: 2022-12-09 | End: 2022-12-10

## 2022-12-09 RX ORDER — CITRIC ACID/SODIUM CITRATE 300-500 MG
15 SOLUTION, ORAL ORAL EVERY 6 HOURS
Refills: 0 | Status: DISCONTINUED | OUTPATIENT
Start: 2022-12-09 | End: 2022-12-09

## 2022-12-09 RX ORDER — PRAMOXINE HYDROCHLORIDE 150 MG/15G
1 AEROSOL, FOAM RECTAL EVERY 4 HOURS
Refills: 0 | Status: DISCONTINUED | OUTPATIENT
Start: 2022-12-09 | End: 2022-12-10

## 2022-12-09 RX ORDER — TETANUS TOXOID, REDUCED DIPHTHERIA TOXOID AND ACELLULAR PERTUSSIS VACCINE, ADSORBED 5; 2.5; 8; 8; 2.5 [IU]/.5ML; [IU]/.5ML; UG/.5ML; UG/.5ML; UG/.5ML
0.5 SUSPENSION INTRAMUSCULAR ONCE
Refills: 0 | Status: DISCONTINUED | OUTPATIENT
Start: 2022-12-09 | End: 2022-12-10

## 2022-12-09 RX ORDER — LANOLIN
1 OINTMENT (GRAM) TOPICAL EVERY 6 HOURS
Refills: 0 | Status: DISCONTINUED | OUTPATIENT
Start: 2022-12-09 | End: 2022-12-10

## 2022-12-09 RX ORDER — SIMETHICONE 80 MG/1
80 TABLET, CHEWABLE ORAL EVERY 4 HOURS
Refills: 0 | Status: DISCONTINUED | OUTPATIENT
Start: 2022-12-09 | End: 2022-12-10

## 2022-12-09 RX ADMIN — Medication 100 MILLIGRAM(S): at 23:32

## 2022-12-09 RX ADMIN — Medication 1000 MILLIUNIT(S)/MIN: at 14:55

## 2022-12-09 RX ADMIN — Medication 975 MILLIGRAM(S): at 21:08

## 2022-12-09 RX ADMIN — Medication 975 MILLIGRAM(S): at 20:08

## 2022-12-09 RX ADMIN — Medication 1 SPRAY(S): at 13:46

## 2022-12-09 RX ADMIN — Medication 600 MILLIGRAM(S): at 23:33

## 2022-12-09 RX ADMIN — Medication 100 MILLIGRAM(S): at 15:41

## 2022-12-09 RX ADMIN — Medication 10 UNIT(S): at 14:58

## 2022-12-09 RX ADMIN — SODIUM CHLORIDE 3 MILLILITER(S): 9 INJECTION INTRAMUSCULAR; INTRAVENOUS; SUBCUTANEOUS at 20:37

## 2022-12-09 RX ADMIN — Medication 4 MILLIUNIT(S)/MIN: at 06:00

## 2022-12-09 RX ADMIN — Medication 30 MILLIGRAM(S): at 15:55

## 2022-12-09 NOTE — CONSULT NOTE ADULT - ATTENDING COMMENTS
32yoF with stage 3 breast ca, s/p mastectomy and chemo during 2nd/3rd trimester, admitted for elective induction. Delivered healthy baby boy, APGAR . Rec CT C/A/P and bone scan for staging. Pt will resume chemo as outpt

## 2022-12-09 NOTE — OB RN PATIENT PROFILE - WILL THE PATIENT ACCEPT THE PFIZER COVID-19 VACCINE IF ELIGIBLE AND IT IS AVAILABLE?
RN contacted pt. Pt states this must have been sent in error by [facility]. She is not on methotrexate.     Pt reports she only needed a refills of amlodipine and HCTZ. Per chart review, amlodipine was sent by our office on 1/30/20 to [facility] pharmacy. RN re-sent that prescription to [facility] on [facility] as requested. Per chart review, HCTZ was prescribed on 11/22/19 for #30 tablets with 5 refills. Notified pt that she should still have refills on file at [facility]. Pt verbalized understanding and had no further questions at this time.   No

## 2022-12-09 NOTE — CONSULT NOTE ADULT - SUBJECTIVE AND OBJECTIVE BOX
Hematology Oncology Consult Note    HPI:  R3 Admission H&P    Subjective  HPI: 32yoF  at 37w3d gestational age presents for IOL 2/2 Stage IIIC poorly differentiated ductal carcinoma of R breast  ER/IA/HER2 POSITIVE , s/p Rt mastectomy+ALD and ddAC+T (started on 22 at 19 weeks gestation last cycle , follows with Dr. Jason)    Oncology is following for continuity of care      #Stage IIIC IDC +ER, +IA, +HER2    -Follows with Dr. Jason, at [facility]     -Dx in 2022 with right breast mass and axillary burning pain, right breast US on 22 (BIRADS 4C) which showed a suspicious palpable mass in the right breast 9:00 and an additional suspicious mass in the right axillary tail, as well as axillary adenopathy.     -S/p  right breast biopsy ( 2022) with Invasive poorly differentiated ductal carcinoma, [place] score 8/9, measuring 1.0 cm ER+ >90%, IA+ >90%, HER 2 POSITIVE.  The right axillary lymph node core biopsy was positive for metastasis measuring 1.0 cm.      -S/p  right mastectomy with Right axillary lymph node dissection on 2022. Pathology from right mastectomy showed multifocal moderate to poorly differentiated ductal carcinoma with chronic inflammatory infiltrate, measuring 5.5 cm and 2.3 cm. High-grade DCIS was removed. 10 out of 16 lymph nodes were positive for metastatic disease (largest size 2.2 cm with extranodal extension)       Stage IIIC pT3 pN3a ER/IA/HER2/sasha POSITIVE on both tumors     -S/p  ddAC+T on 22 at 19 weeks gestation. Last chemo .     -Plan for THP x1 year after delivery; Plan for PMR T after delivery             PAST MEDICAL & SURGICAL HISTORY:  Malignant neoplasm of unspecified site of right female breast      Breast cancer, right      History of tonsillectomy      History of right total mastectomy          FAMILY HISTORY:  No pertinent family history in first degree relatives        MEDICATIONS  (STANDING):  chlorhexidine 2% Cloths 1 Application(s) Topical once  citric acid/sodium citrate Solution 15 milliLiter(s) Oral every 6 hours  dextrose 5% + lactated ringers. 1000 milliLiter(s) (125 mL/Hr) IV Continuous <Continuous>  lactated ringers. 1000 milliLiter(s) (125 mL/Hr) IV Continuous <Continuous>  oxytocin Infusion 333.333 milliUNIT(s)/Min (1000 mL/Hr) IV Continuous <Continuous>  oxytocin Infusion. 4 milliUNIT(s)/Min (4 mL/Hr) IV Continuous <Continuous>    MEDICATIONS  (PRN):      Allergies    No Known Allergies    Intolerances        SOCIAL HISTORY: No EtOH, no tobacco      Height (cm): 165.1 ( @ 08:25)    T(F): 98.06 (22 @ 09:57), Max: 98.2 (22 @ 06:23)  HR: 68 (22 @ 11:01)  BP: 110/57 (22 @ 10:58)  RR: 18 (22 @ 08:25)  SpO2: 99% (22 @ 11:01)  Wt(kg): --    PHYSICAL EXAM:    Constitutional: NAD  Respiratory: CTA b/l, symmetric chest rise, with normal respiratory effort  Cardiovascular: RRR  Gastrointestinal: soft, NTND  Extremities:  no edema  MSK: no obvious abnormalities  Neurological: Grossly intact  Skin: no rash, no echymoses, no petichiae  Psych: normal affect                          11.2   9.26  )-----------( 259      ( 09 Dec 2022 05:58 )             34.4           137  |  104  |  7   ----------------------------<  107<H>  3.7   |  21<L>  |  0.47<L>    Ca    9.6      09 Dec 2022 05:58    TPro  6.2  /  Alb  3.4  /  TBili  0.2  /  DBili  x   /  AST  17  /  ALT  12  /  AlkPhos  125<H>        Uric Acid, Serum: 4.9 mg/dL ( @ 05:58)  Lactate Dehydrogenase, Serum: 181 U/L ( @ 05:58)       Hematology Oncology Consult Note    HPI:  R3 Admission H&P    Subjective  HPI: 32yoF  at 37w3d gestational age presents for IOL 2/2 Stage IIIC poorly differentiated ductal carcinoma of R breast  ER/ID/HER2 POSITIVE , s/p Rt mastectomy+ALD and ddAC+T (started on 22 at 19 weeks gestation last cycle , follows with Dr. Jason)    Oncology is following for continuity of care      #Stage IIIC IDC +ER, +ID, +HER2    -Follows with Dr. Jason, at [facility]     -Dx in 2022 with right breast mass and axillary burning pain, right breast US on 22 (BIRADS 4C) which showed a suspicious palpable mass in the right breast 9:00 and an additional suspicious mass in the right axillary tail, as well as axillary adenopathy.     -S/p  right breast biopsy ( 2022) with Invasive poorly differentiated ductal carcinoma, [place] score 8/9, measuring 1.0 cm ER+ >90%, ID+ >90%, HER 2 POSITIVE.  The right axillary lymph node core biopsy was positive for metastasis measuring 1.0 cm.      -S/p  right mastectomy with Right axillary lymph node dissection on 2022. Pathology from right mastectomy showed multifocal moderate to poorly differentiated ductal carcinoma with chronic inflammatory infiltrate, measuring 5.5 cm and 2.3 cm. High-grade DCIS was removed. 10 out of 16 lymph nodes were positive for metastatic disease (largest size 2.2 cm with extranodal extension)       Stage IIIC pT3 pN3a ER/ID/HER2/sasha POSITIVE on both tumors     -S/p  ddAC+T on 22 at 19 weeks gestation. Last chemo .     -Plan for THP x1 year after delivery; Plan for PMR T after delivery             PAST MEDICAL & SURGICAL HISTORY:  Malignant neoplasm of unspecified site of right female breast      Breast cancer, right      History of tonsillectomy      History of right total mastectomy          FAMILY HISTORY:  No pertinent family history in first degree relatives        MEDICATIONS  (STANDING):  chlorhexidine 2% Cloths 1 Application(s) Topical once  citric acid/sodium citrate Solution 15 milliLiter(s) Oral every 6 hours  dextrose 5% + lactated ringers. 1000 milliLiter(s) (125 mL/Hr) IV Continuous <Continuous>  lactated ringers. 1000 milliLiter(s) (125 mL/Hr) IV Continuous <Continuous>  oxytocin Infusion 333.333 milliUNIT(s)/Min (1000 mL/Hr) IV Continuous <Continuous>  oxytocin Infusion. 4 milliUNIT(s)/Min (4 mL/Hr) IV Continuous <Continuous>    MEDICATIONS  (PRN):      Allergies    No Known Allergies    Intolerances        SOCIAL HISTORY: No EtOH, no tobacco      Height (cm): 165.1 ( @ 08:25)    T(F): 98.06 (22 @ 09:57), Max: 98.2 (22 @ 06:23)  HR: 68 (22 @ 11:01)  BP: 110/57 (22 @ 10:58)  RR: 18 (22 @ 08:25)  SpO2: 99% (22 @ 11:01)  Wt(kg): --    PHYSICAL EXAM:  Unable to complete examination. Patient just delivered.                           11.2   9.26  )-----------( 259      ( 09 Dec 2022 05:58 )             34.4           137  |  104  |  7   ----------------------------<  107<H>  3.7   |  21<L>  |  0.47<L>    Ca    9.6      09 Dec 2022 05:58    TPro  6.2  /  Alb  3.4  /  TBili  0.2  /  DBili  x   /  AST  17  /  ALT  12  /  AlkPhos  125<H>        Uric Acid, Serum: 4.9 mg/dL ( @ 05:58)  Lactate Dehydrogenase, Serum: 181 U/L ( @ 05:58)

## 2022-12-09 NOTE — OB PROVIDER H&P - ATTENDING COMMENTS
Patient admitted for scheduled induction of labor with history of breast CA. Patient P1 at 37wks. Denies obstetrical complaints and reports fetal movement.   Chart reviewed.   Resident exam reviewed. Port in place.   SVE 3/50/-3   Reassuring fetal status with irregular contractions  GBS neg   Plan for pitocin augmentation   -continue EFM and toco   -pain control PRN   -anesthesia for epidural -> CBC prior to placement   -IV fluids      JOYCELYN Kaplan

## 2022-12-09 NOTE — OB PROVIDER DELIVERY SUMMARY - NSPROVIDERDELIVERYNOTE_OBGYN_ALL_OB_FT
Patient pushing over intact perineum.  Live male  delivered from DANIEL position.  Infant placed on mother.  Delayed cord clamping x 30 sec.  Placenta did not spontaneously deliver, manual extraction performed after 30 min, uterus explored post extraction, no evidence of retained placenta, ultrasound use to also confirm thin EMS.  EBL 700cc.  Cervix and sulci intact.  Vaginal laceration repaired in usual fashion with vicryl rapide.

## 2022-12-09 NOTE — OB PROVIDER H&P - NSMATERNALFETALCONCERNS_OBGYN_ALL_OB_FT
Maternal/Fetal Alert  10/24/22 - Invasive poortly differentiated ductal carcinoma on the right breast, Stage IIIc. Had modified radical mastectomy with reconstruction / placement of tissue expanders on 22.  Received adriamycian, cytoxan, and taxol during pregnancy. Oncologist - Dr. Amy Jason. -Janell Khanna RN  22 - Maternal MDM held.  See detailed minutes.  Labor induction planned for 37-38 weeks.  Plan for 4 weeks between last chemotherapy and delivery.  Patient will have CT chest/abdomen/pelvis with IV contrast postpartum.  Chemotherapy will resume approximately 2-3 weeks postpartum.  -Janell Khanna RN  22 - Per NICU consult, obtain CBC/diff on  at birth.  Placental pathology to assess for metastasis.  Outpatient  echo within a few weeks of birth. -Janell Khanna RN

## 2022-12-09 NOTE — DISCHARGE NOTE OB - NS MD DC FALL RISK RISK
For information on Fall & Injury Prevention, visit: [email or web address] OR  [email or web address] OR  https://www.cdc.gov/steadi/patient.html

## 2022-12-09 NOTE — OB PROVIDER DELIVERY SUMMARY - NSSELHIDDEN_OBGYN_ALL_OB_FT
[NS_DeliveryAttending2_OBGYN_ALL_OB_FT:[id number]],[NS_DeliveryAttending1_OBGYN_ALL_OB_FT:[id number]=]

## 2022-12-09 NOTE — OB PROVIDER H&P - ASSESSMENT
33yo  at 37w3d admitted for IOL for maternal breast cancer.       - admit to L&D  - admission labs  - IOL w/ pitocin   - GBS neg  - covid swab     Regine Merchant MD PGY3   Patient d/w Dr. Kaplan

## 2022-12-09 NOTE — DISCHARGE NOTE OB - WILL THE PATIENT ACCEPT THE PFIZER COVID-19 VACCINE IF ELIGIBLE AND IT IS AVAILABLE?
Vertigo: Care Instructions  Your Care Instructions    Vertigo is the feeling that you or your surroundings are moving when there is no actual movement. It is often described as a feeling of spinning, whirling, falling, or tilting. Vertigo may make you vomit or feel nauseated. You may have trouble standing or walking and may lose your balance. Vertigo is often related to an inner ear problem, but it can have other more serious causes. If vertigo continues, you may need more tests to find its cause. Follow-up care is a key part of your treatment and safety. Be sure to make and go to all appointments, and call your doctor if you are having problems. It's also a good idea to know your test results and keep a list of the medicines you take. How can you care for yourself at home? · Do not lie flat on your back. Prop yourself up slightly. This may reduce the spinning feeling. Keep your eyes open. · Move slowly so that you do not fall. · If your doctor recommends medicine, take it exactly as directed. · Do not drive while you are having vertigo. Certain exercises, called Bermudez-Daroff exercises, can help decrease vertigo. To do Bermudez-Daroff exercises:  · Sit on the edge of a bed or sofa and quickly lie down on the side that causes the worst vertigo. Lie on your side with your ear down. · Stay in this position for at least 30 seconds or until the vertigo goes away. · Sit up. If this causes vertigo, wait for it to stop. · Repeat the procedure on the other side. · Repeat this 10 times. Do these exercises 2 times a day until the vertigo is gone. When should you call for help? Call 911 anytime you think you may need emergency care. For example, call if:  ? · You passed out (lost consciousness). ? · You have symptoms of a stroke. These may include:  ¨ Sudden numbness, tingling, weakness, or loss of movement in your face, arm, or leg, especially on only one side of your body.   ¨ Sudden vision changes. ¨ Sudden trouble speaking. ¨ Sudden confusion or trouble understanding simple statements. ¨ Sudden problems with walking or balance. ¨ A sudden, severe headache that is different from past headaches. ?Call your doctor now or seek immediate medical care if:  ? · Vertigo occurs with a fever, a headache, or ringing in your ears. ? · You have new or increased nausea and vomiting. ? Watch closely for changes in your health, and be sure to contact your doctor if:  ? · Vertigo gets worse or happens more often. ? · Vertigo has not gotten better after 2 weeks. Where can you learn more? Go to http://madeleine-bina.info/. Enter A084 in the search box to learn more about \"Vertigo: Care Instructions. \"  Current as of: May 12, 2017  Content Version: 11.4  © 8153-5425 [employer]. Care instructions adapted under license by [employer] (which disclaims liability or warranty for this information). If you have questions about a medical condition or this instruction, always ask your healthcare professional. [place] [address] any warranty or liability for your use of this information. Vertigo: Exercises  Your Care Instructions  Here are some examples of typical rehabilitation exercises for your condition. Start each exercise slowly. Ease off the exercise if you start to have pain. Your doctor or physical therapist will tell you when you can start these exercises and which ones will work best for you. How to do the exercises  Exercise 1    1. Stand with a chair in front of you and a wall behind you. If you begin to fall, you may use them for support. 2. Stand with your feet together and your arms at your sides. 3. Move your head up and down 10 times. Exercise 2    1. Move your head side to side 10 times. Exercise 3    1. Move your head diagonally up and down 10 times. Exercise 4    1.  Move your head diagonally up and down 10 times on the other side.  Follow-up care is a key part of your treatment and safety. Be sure to make and go to all appointments, and call your doctor if you are having problems. It's also a good idea to know your test results and keep a list of the medicines you take. Where can you learn more? Go to http://madeleine-bina.info/. Enter F349 in the search box to learn more about \"Vertigo: Exercises. \"  Current as of: May 4, 2017  Content Version: 11.4  © 0866-2070 [employer], [employer]. Care instructions adapted under license by [employer] (which disclaims liability or warranty for this information). If you have questions about a medical condition or this instruction, always ask your healthcare professional. [address] any warranty or liability for your use of this information. No

## 2022-12-09 NOTE — CHART NOTE - NSCHARTNOTEFT_GEN_A_CORE
R3 Chart Note     Called and spoke to Radiation Oncology , notifying them that patient Gabriella Lui is currently admitted to the hospital for scheduled IOL. From their standpoint patient does not require any additional labs or imaging. Patient will be contacted by the team next week for radiation planning.     Sherita Durant, PGY3

## 2022-12-09 NOTE — OB PROVIDER LABOR PROGRESS NOTE - ASSESSMENT
A/P: IOL for hx of breast cancer   -s/p arom, reassess 2 h, on pitocin   -erin in place  -cat 1 tracing  -gbs neg     Emily Eid MD 
Plan  -peanut ball   -reassess 30 min    Emily Eid MD 
33yo  at 37w3d admitted for IOL for maternal breast cancer.   -continue pitocin   -epidural in place, will get top off   -gbs neg  -cat 1 tracing     Emily Eid MD   
Plan:   continue pitocin   set up table

## 2022-12-09 NOTE — DISCHARGE NOTE OB - HOSPITAL COURSE
Patient admitted  for induction of labor due to history of breast cancer diagnosed this pregnancy.  Patient had vaginal delivery complicated by manual extraction of placenta; one dose of Ancef given.  Patient had consultation with Hematology Oncology during this admission, recommendation was for patient to have CT chest, abdomen and pelvis and a bone scan.  Patient had an unremarkable postpartum course and was discharged home in stable condition to follow-up in OB office in 6 weeks. Patient admitted  for induction of labor due to history of breast cancer diagnosed this pregnancy.  Patient had vaginal delivery complicated by manual extraction of placenta; one dose of Ancef given.  Patient had consultation with Hematology Oncology during this admission, recommendation was for patient to have CT chest, abdomen and pelvis and a bone scan.  Patient had an unremarkable postpartum course and was discharged home in stable condition to follow-up in OB office in 6 weeks.     Patient to be contacted by Dr. Jason to schedule NM Bone scan

## 2022-12-09 NOTE — DISCHARGE NOTE OB - PATIENT PORTAL LINK FT
You can access the FollowMyHealth Patient Portal offered by [facility] by registering at the following website: [email or web address]. By joining [employer]’s FollowMyHealth portal, you will also be able to view your health information using other applications (apps) compatible with our system.

## 2022-12-09 NOTE — DISCHARGE NOTE OB - PROVIDER TOKENS
Most likely secondary to acute on chronic pancreatitis  CT abdomen pelvis reveals -- Apparent mild bowel wall thickening involving the distal stomach, duodenum and proximal jejunum, as described above  Clinical correlation regarding the possibility of gastritis, duodenitis and/or enteritis is suggested  There is no evidence of bowel   obstruction  There is mild colonic diverticulosis without evidence of acute diverticulitis  Findings suggesting hepatic cirrhosis, as described above  Please see discussion  Clinical and laboratory correlation is recommended  This appearance is similar to the prior study  The pancreatic head and proximal to mid pancreatic body appear somewhat atrophic  There is dilatation of the pancreatic duct in the region of the distal pancreatic body and tail  These findings are similar to the prior study  Keep NPO, IV fluids, IV pain management, IV antiemetics  Consult GI  PROVIDER:[TOKEN:[[id number]],FOLLOWUP:[Routine]]

## 2022-12-09 NOTE — OB PROVIDER LABOR PROGRESS NOTE - NS_SUBJECTIVE/OBJECTIVE_OBGYN_ALL_OB_FT
OB attending     Pt examined for change
OB attending     patient feels urge to push
OB attending     Patient examined for cervical change    Vital Signs Last 24 Hrs  T(C): 36.6 (09 Dec 2022 12:11), Max: 36.8 (09 Dec 2022 06:23)  T(F): 97.88 (09 Dec 2022 12:11), Max: 98.2 (09 Dec 2022 06:23)  HR: 98 (09 Dec 2022 13:01) (65 - 101)  BP: 139/81 (09 Dec 2022 12:59) (103/59 - 150/70)  BP(mean): --  RR: 20 (09 Dec 2022 12:11) (18 - 20)  SpO2: 99% (09 Dec 2022 13:01) (86% - 100%)    Parameters below as of 09 Dec 2022 06:23  Patient On (Oxygen Delivery Method): room air
OB attending progress note    Vital Signs Last 24 Hrs  T(C): 36.7 (09 Dec 2022 09:57), Max: 36.8 (09 Dec 2022 06:23)  T(F): 98.06 (09 Dec 2022 09:57), Max: 98.2 (09 Dec 2022 06:23)  HR: 83 (09 Dec 2022 10:06) (68 - 101)  BP: 109/71 (09 Dec 2022 09:58) (105/69 - 132/69)  BP(mean): --  RR: 18 (09 Dec 2022 08:25) (18 - 18)  SpO2: 100% (09 Dec 2022 10:06) (86% - 100%)    Parameters below as of 09 Dec 2022 06:23  Patient On (Oxygen Delivery Method): room air                              11.2   9.26  )-----------( 259      ( 09 Dec 2022 05:58 )             34.4

## 2022-12-09 NOTE — PRE-ANESTHESIA EVALUATION ADULT - NSANTHPMHFT_GEN_ALL_CORE
32yoF  at 37w3d gestational age presents for IOL 2/2 maternal breast cancer - Stage IIIC poorly differentiated ductal carcinoma of R breast, s/p mastectomy and Chemo(ACT - last cycle ). She is due for RT postpartum.

## 2022-12-09 NOTE — OB RN DELIVERY SUMMARY - NSSELHIDDEN_OBGYN_ALL_OB_FT
[NS_DeliveryAttending2_OBGYN_ALL_OB_FT:[id number]],[NS_DeliveryAttending1_OBGYN_ALL_OB_FT:[id number]]

## 2022-12-09 NOTE — PRE-ANESTHESIA EVALUATION ADULT - NSANTHADDINFOFT_GEN_ALL_CORE
Indications and risks of neuraxial anesthesia were discussed including but not limited to infection, bleeding in neuraxial space and nerve damage. These risks were acknowledged and accepted by patient, all of her questions were answered as well.

## 2022-12-09 NOTE — DISCHARGE NOTE OB - CARE PLAN
1 Principal Discharge DX:	Normal vaginal delivery  Assessment and plan of treatment:	After discharge, please stay on pelvic rest for 6 weeks, meaning no sexual intercourse, no tampons and no douching.  No driving for 2 weeks as women can loose a lot of blood during delivery and there is a possibility of being lightheaded/fainting.  No lifting objects heavier than baby for two weeks.  Expect to have vaginal bleeding/spotting for up to six weeks.  The bleeding should get lighter and more white/light brown with time.  For bleeding soaking more than a pad an hour or passing clots greater than the size of your fist, come in to the emergency department.

## 2022-12-09 NOTE — CONSULT NOTE ADULT - ASSESSMENT
32yoF  at 37w3d gestational age presents for IOL 2/2 Stage IIIC poorly differentiated ductal carcinoma of R breast  ER/NM/HER2 POSITIVE , s/p Rt mastectomy+ALD and ddAC+T (started on 22 at 19 weeks gestation last cycle , follows with Dr. Jason)       #Stage IIIC IDC +ER, +NM, +HER2    -Dx in 2022 with right breast mass and axillary burning pain, right breast US on 22 (BIRADS 4C) which showed a suspicious palpable mass in the right breast 9:00 and an additional suspicious mass in the right axillary tail, as well as axillary adenopathy.     -S/p  right breast biopsy ( 2022) with Invasive poorly differentiated ductal carcinoma, Lorraine score 8/9, measuring 1.0 cm ER+ >90%, NM+ >90%, HER 2 POSITIVE.  The right axillary lymph node core biopsy was positive for metastasis measuring 1.0 cm.      -S/p  right mastectomy with Right axillary lymph node dissection on 2022. Pathology from right mastectomy showed multifocal moderate to poorly differentiated ductal carcinoma with chronic inflammatory infiltrate, measuring 5.5 cm and 2.3 cm. High-grade DCIS was removed. 10 out of 16 lymph nodes were positive for metastatic disease (largest size 2.2 cm with extranodal extension)       -Stage IIIC pT3 pN3a ER/NM/HER2/sasha POSITIVE on both tumors     -S/p  ddAC+T on 22 at 19 weeks gestation. Last chemo .     -Plan for THP x1 year after delivery; Plan for PMR T after delivery      -Please obtain CT C/A/P, and bone scan after delivery      -Patient to follow up with Dr. Jason  32yoF  at 37w3d gestational age presents for IOL 2/2 Stage IIIC poorly differentiated ductal carcinoma of R breast  ER/ND/HER2 POSITIVE , s/p Rt mastectomy+ALD and ddAC+T (started on 22 at 19 weeks gestation last cycle , follows with Dr. Jason)       #Stage IIIC IDC +ER, +ND, +HER2    -Dx in 2022 with right breast mass and axillary burning pain, right breast US on 22 (BIRADS 4C) which showed a suspicious palpable mass in the right breast 9:00 and an additional suspicious mass in the right axillary tail, as well as axillary adenopathy.     -S/p  right breast biopsy ( 2022) with Invasive poorly differentiated ductal carcinoma, Lorraine score 8/9, measuring 1.0 cm ER+ >90%, ND+ >90%, HER 2 POSITIVE.  The right axillary lymph node core biopsy was positive for metastasis measuring 1.0 cm.      -S/p  right mastectomy with Right axillary lymph node dissection on 2022. Pathology from right mastectomy showed multifocal moderate to poorly differentiated ductal carcinoma with chronic inflammatory infiltrate, measuring 5.5 cm and 2.3 cm. High-grade DCIS was removed. 10 out of 16 lymph nodes were positive for metastatic disease (largest size 2.2 cm with extranodal extension)       -Stage IIIC pT3 pN3a ER/ND/HER2/sasha POSITIVE on both tumors     -S/p  ddAC+T on 22 at 19 weeks gestation. Last chemo .     -Plan for THP x1 year after delivery; Plan for PMR T after delivery      -Please obtain CT C/A/P, and bone scan after delivery      -Patient to follow up with Dr. Jason

## 2022-12-09 NOTE — OB RN DELIVERY SUMMARY - NS_SEPSISRSKCALC_OBGYN_ALL_OB_FT
EOS calculated successfully. EOS Risk Factor: 0.5/1000 live births ([facility] national incidence); GA=37w3d; Temp=98.2; ROM=4.667; GBS='Negative'; Antibiotics='No antibiotics or any antibiotics < 2 hrs prior to birth'

## 2022-12-09 NOTE — OB RN DELIVERY SUMMARY - NS_SEROLOGYDONE_OBGYN_ALL_OB
Ian Shafer. Elyssa has pretty advanced OA in medial compartment along with torn MM. These types of tears concern me because scope can sometimes lead to more rapid progression of OA. Despite her needle phobia, I gave her an injection today. This will help her with her upcoming trip. Judicious consideration of scope only if this doesn't help. Thanks Ronnie  Yes

## 2022-12-09 NOTE — OB RN DELIVERY SUMMARY - NSDELIVERYTYPEA_OBGYN_ALL_OB
Vaginal Delivery
I will STOP taking the medications listed below when I get home from the hospital:  None

## 2022-12-09 NOTE — OB PROVIDER H&P - NSLOWPPHRISK_OBGYN_A_OB
No previous uterine incision/Diop Pregnancy/Less than or equal to 4 previous vaginal births/No known bleeding disorder/No history of postpartum hemorrhage/No other PPH risks indicated

## 2022-12-09 NOTE — DISCHARGE NOTE OB - MEDICATION SUMMARY - MEDICATIONS TO TAKE
I will START or STAY ON the medications listed below when I get home from the hospital:    prenatal vitamin  -- 1 tab(s) by mouth once a day. last dose 6/13/22.  -- Indication: For Encounter for full-term uncomplicated delivery    ibuprofen 600 mg oral tablet  -- 1 tab(s) by mouth every 6 hours  -- Indication: For Encounter for full-term uncomplicated delivery    acetaminophen 325 mg oral tablet  -- 2 tab(s) by mouth every 6 hours  -- Indication: For Encounter for full-term uncomplicated delivery    omeprazole 40 mg oral delayed release capsule  -- 1 cap(s) by mouth once a day  -- Indication: For GERD

## 2022-12-09 NOTE — DISCHARGE NOTE OB - CARE PROVIDER_API CALL
Emily Eid)  Obstetrics and Gynecology  [address], [address]  [place] [address]  Phone: [phone]  Fax: [phone]  Follow Up Time: Routine

## 2022-12-10 VITALS
SYSTOLIC BLOOD PRESSURE: 103 MMHG | HEART RATE: 90 BPM | TEMPERATURE: 98 F | RESPIRATION RATE: 18 BRPM | DIASTOLIC BLOOD PRESSURE: 70 MMHG | OXYGEN SATURATION: 98 %

## 2022-12-10 LAB
BASOPHILS # BLD AUTO: 0.01 K/UL — SIGNIFICANT CHANGE UP (ref 0–0.2)
BASOPHILS NFR BLD AUTO: 0.1 % — SIGNIFICANT CHANGE UP (ref 0–2)
EOSINOPHIL # BLD AUTO: 0.18 K/UL — SIGNIFICANT CHANGE UP (ref 0–0.5)
EOSINOPHIL NFR BLD AUTO: 1.5 % — SIGNIFICANT CHANGE UP (ref 0–6)
HCT VFR BLD CALC: 29.7 % — LOW (ref 34.5–45)
HGB BLD-MCNC: 9.6 G/DL — LOW (ref 11.5–15.5)
IMM GRANULOCYTES NFR BLD AUTO: 0.6 % — SIGNIFICANT CHANGE UP (ref 0–0.9)
LYMPHOCYTES # BLD AUTO: 16.8 % — SIGNIFICANT CHANGE UP (ref 13–44)
LYMPHOCYTES # BLD AUTO: 2.01 K/UL — SIGNIFICANT CHANGE UP (ref 1–3.3)
MCHC RBC-ENTMCNC: 28.7 PG — SIGNIFICANT CHANGE UP (ref 27–34)
MCHC RBC-ENTMCNC: 32.3 GM/DL — SIGNIFICANT CHANGE UP (ref 32–36)
MCV RBC AUTO: 88.9 FL — SIGNIFICANT CHANGE UP (ref 80–100)
MONOCYTES # BLD AUTO: 0.69 K/UL — SIGNIFICANT CHANGE UP (ref 0–0.9)
MONOCYTES NFR BLD AUTO: 5.8 % — SIGNIFICANT CHANGE UP (ref 2–14)
NEUTROPHILS # BLD AUTO: 9.02 K/UL — HIGH (ref 1.8–7.4)
NEUTROPHILS NFR BLD AUTO: 75.2 % — SIGNIFICANT CHANGE UP (ref 43–77)
NRBC # BLD: 0 /100 WBCS — SIGNIFICANT CHANGE UP (ref 0–0)
PLATELET # BLD AUTO: 255 K/UL — SIGNIFICANT CHANGE UP (ref 150–400)
RBC # BLD: 3.34 M/UL — LOW (ref 3.8–5.2)
RBC # FLD: 13.5 % — SIGNIFICANT CHANGE UP (ref 10.3–14.5)
WBC # BLD: 11.98 K/UL — HIGH (ref 3.8–10.5)
WBC # FLD AUTO: 11.98 K/UL — HIGH (ref 3.8–10.5)

## 2022-12-10 PROCEDURE — 81001 URINALYSIS AUTO W/SCOPE: CPT

## 2022-12-10 PROCEDURE — 86900 BLOOD TYPING SEROLOGIC ABO: CPT

## 2022-12-10 PROCEDURE — 86901 BLOOD TYPING SEROLOGIC RH(D): CPT

## 2022-12-10 PROCEDURE — 36415 COLL VENOUS BLD VENIPUNCTURE: CPT

## 2022-12-10 PROCEDURE — 86780 TREPONEMA PALLIDUM: CPT

## 2022-12-10 PROCEDURE — 71260 CT THORAX DX C+: CPT

## 2022-12-10 PROCEDURE — 85025 COMPLETE CBC W/AUTO DIFF WBC: CPT

## 2022-12-10 PROCEDURE — 86850 RBC ANTIBODY SCREEN: CPT

## 2022-12-10 PROCEDURE — 88307 TISSUE EXAM BY PATHOLOGIST: CPT

## 2022-12-10 PROCEDURE — 74177 CT ABD & PELVIS W/CONTRAST: CPT

## 2022-12-10 PROCEDURE — 84156 ASSAY OF PROTEIN URINE: CPT

## 2022-12-10 PROCEDURE — 82570 ASSAY OF URINE CREATININE: CPT

## 2022-12-10 PROCEDURE — 59050 FETAL MONITOR W/REPORT: CPT

## 2022-12-10 PROCEDURE — 85384 FIBRINOGEN ACTIVITY: CPT

## 2022-12-10 PROCEDURE — 84550 ASSAY OF BLOOD/URIC ACID: CPT

## 2022-12-10 PROCEDURE — 85730 THROMBOPLASTIN TIME PARTIAL: CPT

## 2022-12-10 PROCEDURE — 86769 SARS-COV-2 COVID-19 ANTIBODY: CPT

## 2022-12-10 PROCEDURE — 83615 LACTATE (LD) (LDH) ENZYME: CPT

## 2022-12-10 PROCEDURE — 80053 COMPREHEN METABOLIC PANEL: CPT

## 2022-12-10 PROCEDURE — 59025 FETAL NON-STRESS TEST: CPT

## 2022-12-10 PROCEDURE — 85610 PROTHROMBIN TIME: CPT

## 2022-12-10 RX ORDER — FERROUS SULFATE 325(65) MG
325 TABLET ORAL THREE TIMES A DAY
Refills: 0 | Status: DISCONTINUED | OUTPATIENT
Start: 2022-12-10 | End: 2022-12-10

## 2022-12-10 RX ORDER — ASCORBIC ACID 60 MG
500 TABLET,CHEWABLE ORAL THREE TIMES A DAY
Refills: 0 | Status: DISCONTINUED | OUTPATIENT
Start: 2022-12-10 | End: 2022-12-10

## 2022-12-10 RX ORDER — IBUPROFEN 200 MG
1 TABLET ORAL
Qty: 0 | Refills: 0 | DISCHARGE
Start: 2022-12-10

## 2022-12-10 RX ADMIN — Medication 600 MILLIGRAM(S): at 00:33

## 2022-12-10 RX ADMIN — Medication 600 MILLIGRAM(S): at 06:34

## 2022-12-10 RX ADMIN — Medication 975 MILLIGRAM(S): at 03:40

## 2022-12-10 RX ADMIN — Medication 975 MILLIGRAM(S): at 17:20

## 2022-12-10 RX ADMIN — Medication 975 MILLIGRAM(S): at 04:40

## 2022-12-10 RX ADMIN — SODIUM CHLORIDE 3 MILLILITER(S): 9 INJECTION INTRAMUSCULAR; INTRAVENOUS; SUBCUTANEOUS at 06:05

## 2022-12-10 RX ADMIN — Medication 600 MILLIGRAM(S): at 13:00

## 2022-12-10 RX ADMIN — Medication 975 MILLIGRAM(S): at 17:50

## 2022-12-10 RX ADMIN — Medication 100 MILLIGRAM(S): at 06:33

## 2022-12-10 RX ADMIN — Medication 975 MILLIGRAM(S): at 09:44

## 2022-12-10 RX ADMIN — Medication 600 MILLIGRAM(S): at 12:26

## 2022-12-10 RX ADMIN — Medication 1 TABLET(S): at 12:26

## 2022-12-10 RX ADMIN — Medication 975 MILLIGRAM(S): at 09:14

## 2022-12-10 NOTE — CHART NOTE - NSCHARTNOTEFT_GEN_A_CORE
R3 Chart Note     Spoke to heme onc in regards to imaging needed for restaging of cancer.   As requested by Heme Onc, patient received CT chest / Abd and pelvis which showed no metastatic disease     NM Bone scan pending. Imaging unable to be performed over the weekend.     As per Heme Onc , Dr. Jason will be notified and NM Bone Scan can be performed as an outpatient early next week.     Patient cleared for discharge     d/w Dr. Oswaldo Durant, PGY3

## 2022-12-10 NOTE — PROGRESS NOTE ADULT - ASSESSMENT
A/P: 31yo PPD#1 s/p .  Patient is stable and doing well post-partum.    #Breast Cancer  - s/p mastectomy, chemo  - plan for further treatment PP  - Radonc and hemeonc following, appreciate recs  - CTAP performed, f/u results  - NM Bone scan pending   - f/u Placental Path    #Postpartum   - Pain well controlled, continue current pain regimen  - Increase ambulation, SCDs when not ambulating  - Continue regular diet    Regine Merchant MD PGY3

## 2022-12-10 NOTE — PROGRESS NOTE ADULT - SUBJECTIVE AND OBJECTIVE BOX
OB Progress Note:  PPD#1    S: 31yo  PPD#1 s/p . Patient feels well. Pain is well controlled, tolerating regular diet, passing flatus, voiding spontaneously, ambulating without difficulty. Denies heavy vaginal bleeding, CP/SOB, N/V, lightheadedness/dizziness.     O:  Vitals:  Vital Signs Last 24 Hrs  T(C): 36.6 (10 Dec 2022 05:07), Max: 37.1 (09 Dec 2022 17:20)  T(F): 97.9 (10 Dec 2022 05:07), Max: 98.8 (09 Dec 2022 17:20)  HR: 85 (10 Dec 2022 05:07) (65 - 111)  BP: 120/72 (10 Dec 2022 05:07) (103/59 - 159/74)  BP(mean): --  RR: 17 (10 Dec 2022 05:07) (16 - 20)  SpO2: 98% (10 Dec 2022 05:07) (78% - 100%)    Parameters below as of 10 Dec 2022 05:07  Patient On (Oxygen Delivery Method): room air        MEDICATIONS  (STANDING):  acetaminophen     Tablet .. 975 milliGRAM(s) Oral <User Schedule>  diphtheria/tetanus/pertussis (acellular) Vaccine (Adacel) 0.5 milliLiter(s) IntraMuscular once  ibuprofen  Tablet. 600 milliGRAM(s) Oral every 6 hours  oxytocin Infusion 41.667 milliUNIT(s)/Min (125 mL/Hr) IV Continuous <Continuous>  prenatal multivitamin 1 Tablet(s) Oral daily  sodium chloride 0.9% lock flush 3 milliLiter(s) IV Push every 8 hours      Labs:  Blood type: O Positive  Rubella IgG: RPR: Negative                          9.6<L>   11.98<H> >-----------< 255    ( 12-10 @ 06:03 )             29.7<L>                        11.2<L>   9.26 >-----------< 259    (  @ 05:58 )             34.4<L>    22 @ 05:58      137  |  104  |  7   ----------------------------<  107<H>  3.7   |  21<L>  |  0.47<L>        Ca    9.6      09 Dec 2022 05:58    TPro  6.2  /  Alb  3.4  /  TBili  0.2  /  DBili  x   /  AST  17  /  ALT  12  /  AlkPhos  125<H>  22 @ 05:58          Physical Exam:  General: NAD  CV: RRR  Resp: CTAB  Abdomen: soft, non-tender, non-distended, fundus firm  : Nl lochia  Extremities: No erythema/edema    
OB Attending Note    S: Patient doing well. Minimal lochia. Pain controlled.    O: Vital Signs Last 24 Hrs  T(C): 36.7 (10 Dec 2022 09:10), Max: 37.1 (09 Dec 2022 17:20)  T(F): 98.1 (10 Dec 2022 09:10), Max: 98.8 (09 Dec 2022 17:20)  HR: 98 (10 Dec 2022 09:10) (65 - 111)  BP: 103/71 (10 Dec 2022 09:10) (103/59 - 159/74)          Gen: NAD  Abd: soft, NT, ND, fundus firm below umbilicus  Lochia: min  Perineum healing well  Ext: no tenderness    Labs:                        9.6    11.98 )-----------( 255      ( 10 Dec 2022 06:03 )             29.7       A: 32y PPD# 1 s/p  doing well.    Plan:   Discharge instructions reviewed, pain control with NSAIDS/continue PNVs, nothing per vagina x6 weeks  f/u 6 weeks for pp check, call with issues or concerns    Circ counseling done, risks and benefits reviewed, Risks include not limited to bleeding, infection, organ damage, permanent cosmetic changes and need for repeat circ

## 2022-12-15 ENCOUNTER — NON-APPOINTMENT (OUTPATIENT)
Age: 32
End: 2022-12-15

## 2022-12-16 ENCOUNTER — OUTPATIENT (OUTPATIENT)
Dept: OUTPATIENT SERVICES | Facility: HOSPITAL | Age: 32
LOS: 1 days | Discharge: HOME | End: 2022-12-16

## 2022-12-16 DIAGNOSIS — Z90.89 ACQUIRED ABSENCE OF OTHER ORGANS: Chronic | ICD-10-CM

## 2022-12-16 DIAGNOSIS — Z90.11 ACQUIRED ABSENCE OF RIGHT BREAST AND NIPPLE: Chronic | ICD-10-CM

## 2022-12-16 DIAGNOSIS — C50.919 MALIGNANT NEOPLASM OF UNSPECIFIED SITE OF UNSPECIFIED FEMALE BREAST: ICD-10-CM

## 2022-12-23 ENCOUNTER — APPOINTMENT (OUTPATIENT)
Dept: HEMATOLOGY ONCOLOGY | Facility: CLINIC | Age: 32
End: 2022-12-23

## 2022-12-23 ENCOUNTER — RESULT REVIEW (OUTPATIENT)
Age: 32
End: 2022-12-23

## 2022-12-23 ENCOUNTER — APPOINTMENT (OUTPATIENT)
Dept: INFUSION THERAPY | Facility: HOSPITAL | Age: 32
End: 2022-12-23

## 2022-12-23 VITALS
WEIGHT: 206.79 LBS | BODY MASS INDEX: 34.45 KG/M2 | SYSTOLIC BLOOD PRESSURE: 123 MMHG | HEIGHT: 65 IN | HEART RATE: 75 BPM | OXYGEN SATURATION: 98 % | RESPIRATION RATE: 16 BRPM | DIASTOLIC BLOOD PRESSURE: 83 MMHG | TEMPERATURE: 97.3 F

## 2022-12-23 LAB
BASOPHILS # BLD AUTO: 0.03 K/UL — SIGNIFICANT CHANGE UP (ref 0–0.2)
BASOPHILS NFR BLD AUTO: 0.5 % — SIGNIFICANT CHANGE UP (ref 0–2)
EOSINOPHIL # BLD AUTO: 0.16 K/UL — SIGNIFICANT CHANGE UP (ref 0–0.5)
EOSINOPHIL NFR BLD AUTO: 2.6 % — SIGNIFICANT CHANGE UP (ref 0–6)
HCT VFR BLD CALC: 33.1 % — LOW (ref 34.5–45)
HGB BLD-MCNC: 10.8 G/DL — LOW (ref 11.5–15.5)
IMM GRANULOCYTES NFR BLD AUTO: 1 % — HIGH (ref 0–0.9)
LYMPHOCYTES # BLD AUTO: 1.9 K/UL — SIGNIFICANT CHANGE UP (ref 1–3.3)
LYMPHOCYTES # BLD AUTO: 31.5 % — SIGNIFICANT CHANGE UP (ref 13–44)
MCHC RBC-ENTMCNC: 28.3 PG — SIGNIFICANT CHANGE UP (ref 27–34)
MCHC RBC-ENTMCNC: 32.6 G/DL — SIGNIFICANT CHANGE UP (ref 32–36)
MCV RBC AUTO: 86.6 FL — SIGNIFICANT CHANGE UP (ref 80–100)
MONOCYTES # BLD AUTO: 0.36 K/UL — SIGNIFICANT CHANGE UP (ref 0–0.9)
MONOCYTES NFR BLD AUTO: 6 % — SIGNIFICANT CHANGE UP (ref 2–14)
NEUTROPHILS # BLD AUTO: 3.53 K/UL — SIGNIFICANT CHANGE UP (ref 1.8–7.4)
NEUTROPHILS NFR BLD AUTO: 58.4 % — SIGNIFICANT CHANGE UP (ref 43–77)
NRBC # BLD: 0 /100 WBCS — SIGNIFICANT CHANGE UP (ref 0–0)
PLATELET # BLD AUTO: 399 K/UL — SIGNIFICANT CHANGE UP (ref 150–400)
RBC # BLD: 3.82 M/UL — SIGNIFICANT CHANGE UP (ref 3.8–5.2)
RBC # FLD: 13.7 % — SIGNIFICANT CHANGE UP (ref 10.3–14.5)
WBC # BLD: 6.04 K/UL — SIGNIFICANT CHANGE UP (ref 3.8–10.5)
WBC # FLD AUTO: 6.04 K/UL — SIGNIFICANT CHANGE UP (ref 3.8–10.5)

## 2022-12-23 PROCEDURE — 99215 OFFICE O/P EST HI 40 MIN: CPT

## 2022-12-24 LAB
SURGICAL PATHOLOGY STUDY: SIGNIFICANT CHANGE UP

## 2022-12-27 ENCOUNTER — OUTPATIENT (OUTPATIENT)
Dept: OUTPATIENT SERVICES | Facility: HOSPITAL | Age: 32
LOS: 1 days | Discharge: HOME | End: 2022-12-27
Payer: COMMERCIAL

## 2022-12-27 DIAGNOSIS — Z51.11 ENCOUNTER FOR ANTINEOPLASTIC CHEMOTHERAPY: ICD-10-CM

## 2022-12-27 DIAGNOSIS — R11.2 NAUSEA WITH VOMITING, UNSPECIFIED: ICD-10-CM

## 2022-12-27 DIAGNOSIS — Z90.89 ACQUIRED ABSENCE OF OTHER ORGANS: Chronic | ICD-10-CM

## 2022-12-27 DIAGNOSIS — Z90.11 ACQUIRED ABSENCE OF RIGHT BREAST AND NIPPLE: Chronic | ICD-10-CM

## 2022-12-27 PROCEDURE — 77263 THER RADIOLOGY TX PLNG CPLX: CPT

## 2022-12-30 ENCOUNTER — RESULT REVIEW (OUTPATIENT)
Age: 32
End: 2022-12-30

## 2022-12-30 ENCOUNTER — APPOINTMENT (OUTPATIENT)
Dept: INFUSION THERAPY | Facility: HOSPITAL | Age: 32
End: 2022-12-30

## 2022-12-30 LAB
BASOPHILS # BLD AUTO: 0.03 K/UL — SIGNIFICANT CHANGE UP (ref 0–0.2)
BASOPHILS NFR BLD AUTO: 0.6 % — SIGNIFICANT CHANGE UP (ref 0–2)
EOSINOPHIL # BLD AUTO: 0.17 K/UL — SIGNIFICANT CHANGE UP (ref 0–0.5)
EOSINOPHIL NFR BLD AUTO: 3.2 % — SIGNIFICANT CHANGE UP (ref 0–6)
HCT VFR BLD CALC: 31.5 % — LOW (ref 34.5–45)
HGB BLD-MCNC: 10.3 G/DL — LOW (ref 11.5–15.5)
IMM GRANULOCYTES NFR BLD AUTO: 0.7 % — SIGNIFICANT CHANGE UP (ref 0–0.9)
LYMPHOCYTES # BLD AUTO: 2.15 K/UL — SIGNIFICANT CHANGE UP (ref 1–3.3)
LYMPHOCYTES # BLD AUTO: 39.9 % — SIGNIFICANT CHANGE UP (ref 13–44)
MCHC RBC-ENTMCNC: 27.8 PG — SIGNIFICANT CHANGE UP (ref 27–34)
MCHC RBC-ENTMCNC: 32.7 G/DL — SIGNIFICANT CHANGE UP (ref 32–36)
MCV RBC AUTO: 84.9 FL — SIGNIFICANT CHANGE UP (ref 80–100)
MONOCYTES # BLD AUTO: 0.33 K/UL — SIGNIFICANT CHANGE UP (ref 0–0.9)
MONOCYTES NFR BLD AUTO: 6.1 % — SIGNIFICANT CHANGE UP (ref 2–14)
NEUTROPHILS # BLD AUTO: 2.67 K/UL — SIGNIFICANT CHANGE UP (ref 1.8–7.4)
NEUTROPHILS NFR BLD AUTO: 49.5 % — SIGNIFICANT CHANGE UP (ref 43–77)
NRBC # BLD: 0 /100 WBCS — SIGNIFICANT CHANGE UP (ref 0–0)
PLATELET # BLD AUTO: 311 K/UL — SIGNIFICANT CHANGE UP (ref 150–400)
RBC # BLD: 3.71 M/UL — LOW (ref 3.8–5.2)
RBC # FLD: 13.7 % — SIGNIFICANT CHANGE UP (ref 10.3–14.5)
WBC # BLD: 5.39 K/UL — SIGNIFICANT CHANGE UP (ref 3.8–10.5)
WBC # FLD AUTO: 5.39 K/UL — SIGNIFICANT CHANGE UP (ref 3.8–10.5)

## 2022-12-31 LAB
ALBUMIN SERPL ELPH-MCNC: 4.3 G/DL — SIGNIFICANT CHANGE UP (ref 3.3–5)
ALP SERPL-CCNC: 98 U/L — SIGNIFICANT CHANGE UP (ref 40–120)
ALT FLD-CCNC: 18 U/L — SIGNIFICANT CHANGE UP (ref 10–45)
ANION GAP SERPL CALC-SCNC: 13 MMOL/L — SIGNIFICANT CHANGE UP (ref 5–17)
AST SERPL-CCNC: 18 U/L — SIGNIFICANT CHANGE UP (ref 10–40)
BILIRUB SERPL-MCNC: 0.2 MG/DL — SIGNIFICANT CHANGE UP (ref 0.2–1.2)
BUN SERPL-MCNC: 13 MG/DL — SIGNIFICANT CHANGE UP (ref 7–23)
CALCIUM SERPL-MCNC: 9.1 MG/DL — SIGNIFICANT CHANGE UP (ref 8.4–10.5)
CHLORIDE SERPL-SCNC: 104 MMOL/L — SIGNIFICANT CHANGE UP (ref 96–108)
CO2 SERPL-SCNC: 22 MMOL/L — SIGNIFICANT CHANGE UP (ref 22–31)
CREAT SERPL-MCNC: 0.65 MG/DL — SIGNIFICANT CHANGE UP (ref 0.5–1.3)
EGFR: 118 ML/MIN/1.73M2 — SIGNIFICANT CHANGE UP
GLUCOSE SERPL-MCNC: 69 MG/DL — LOW (ref 70–99)
MAGNESIUM SERPL-MCNC: 1.9 MG/DL — SIGNIFICANT CHANGE UP (ref 1.6–2.6)
POTASSIUM SERPL-MCNC: 4.5 MMOL/L — SIGNIFICANT CHANGE UP (ref 3.5–5.3)
POTASSIUM SERPL-SCNC: 4.5 MMOL/L — SIGNIFICANT CHANGE UP (ref 3.5–5.3)
PROT SERPL-MCNC: 6.4 G/DL — SIGNIFICANT CHANGE UP (ref 6–8.3)
SODIUM SERPL-SCNC: 139 MMOL/L — SIGNIFICANT CHANGE UP (ref 135–145)

## 2023-01-06 ENCOUNTER — RESULT REVIEW (OUTPATIENT)
Age: 33
End: 2023-01-06

## 2023-01-06 ENCOUNTER — APPOINTMENT (OUTPATIENT)
Dept: INFUSION THERAPY | Facility: HOSPITAL | Age: 33
End: 2023-01-06

## 2023-01-06 ENCOUNTER — APPOINTMENT (OUTPATIENT)
Dept: HEMATOLOGY ONCOLOGY | Facility: CLINIC | Age: 33
End: 2023-01-06
Payer: COMMERCIAL

## 2023-01-06 ENCOUNTER — APPOINTMENT (OUTPATIENT)
Dept: NUCLEAR MEDICINE | Facility: IMAGING CENTER | Age: 33
End: 2023-01-06
Payer: COMMERCIAL

## 2023-01-06 ENCOUNTER — OUTPATIENT (OUTPATIENT)
Dept: OUTPATIENT SERVICES | Facility: HOSPITAL | Age: 33
LOS: 1 days | End: 2023-01-06
Payer: COMMERCIAL

## 2023-01-06 VITALS
HEIGHT: 65 IN | HEART RATE: 80 BPM | WEIGHT: 207.43 LBS | OXYGEN SATURATION: 97 % | TEMPERATURE: 97.2 F | SYSTOLIC BLOOD PRESSURE: 133 MMHG | RESPIRATION RATE: 16 BRPM | DIASTOLIC BLOOD PRESSURE: 80 MMHG | BODY MASS INDEX: 34.56 KG/M2

## 2023-01-06 DIAGNOSIS — Z00.8 ENCOUNTER FOR OTHER GENERAL EXAMINATION: ICD-10-CM

## 2023-01-06 DIAGNOSIS — C50.911 MALIGNANT NEOPLASM OF UNSPECIFIED SITE OF RIGHT FEMALE BREAST: ICD-10-CM

## 2023-01-06 DIAGNOSIS — Z90.89 ACQUIRED ABSENCE OF OTHER ORGANS: Chronic | ICD-10-CM

## 2023-01-06 DIAGNOSIS — Z90.11 ACQUIRED ABSENCE OF RIGHT BREAST AND NIPPLE: Chronic | ICD-10-CM

## 2023-01-06 LAB
BASOPHILS # BLD AUTO: 0.04 K/UL — SIGNIFICANT CHANGE UP (ref 0–0.2)
BASOPHILS NFR BLD AUTO: 0.7 % — SIGNIFICANT CHANGE UP (ref 0–2)
EOSINOPHIL # BLD AUTO: 0.12 K/UL — SIGNIFICANT CHANGE UP (ref 0–0.5)
EOSINOPHIL NFR BLD AUTO: 2.2 % — SIGNIFICANT CHANGE UP (ref 0–6)
HCT VFR BLD CALC: 31 % — LOW (ref 34.5–45)
HGB BLD-MCNC: 10 G/DL — LOW (ref 11.5–15.5)
IMM GRANULOCYTES NFR BLD AUTO: 0.9 % — SIGNIFICANT CHANGE UP (ref 0–0.9)
LYMPHOCYTES # BLD AUTO: 1.93 K/UL — SIGNIFICANT CHANGE UP (ref 1–3.3)
LYMPHOCYTES # BLD AUTO: 36.1 % — SIGNIFICANT CHANGE UP (ref 13–44)
MCHC RBC-ENTMCNC: 27.5 PG — SIGNIFICANT CHANGE UP (ref 27–34)
MCHC RBC-ENTMCNC: 32.3 G/DL — SIGNIFICANT CHANGE UP (ref 32–36)
MCV RBC AUTO: 85.4 FL — SIGNIFICANT CHANGE UP (ref 80–100)
MONOCYTES # BLD AUTO: 0.28 K/UL — SIGNIFICANT CHANGE UP (ref 0–0.9)
MONOCYTES NFR BLD AUTO: 5.2 % — SIGNIFICANT CHANGE UP (ref 2–14)
NEUTROPHILS # BLD AUTO: 2.93 K/UL — SIGNIFICANT CHANGE UP (ref 1.8–7.4)
NEUTROPHILS NFR BLD AUTO: 54.9 % — SIGNIFICANT CHANGE UP (ref 43–77)
NRBC # BLD: 0 /100 WBCS — SIGNIFICANT CHANGE UP (ref 0–0)
PLATELET # BLD AUTO: 312 K/UL — SIGNIFICANT CHANGE UP (ref 150–400)
RBC # BLD: 3.63 M/UL — LOW (ref 3.8–5.2)
RBC # FLD: 14.5 % — SIGNIFICANT CHANGE UP (ref 10.3–14.5)
WBC # BLD: 5.35 K/UL — SIGNIFICANT CHANGE UP (ref 3.8–10.5)
WBC # FLD AUTO: 5.35 K/UL — SIGNIFICANT CHANGE UP (ref 3.8–10.5)

## 2023-01-06 PROCEDURE — 78306 BONE IMAGING WHOLE BODY: CPT | Mod: 26

## 2023-01-06 PROCEDURE — 78306 BONE IMAGING WHOLE BODY: CPT

## 2023-01-06 PROCEDURE — A9561: CPT

## 2023-01-06 PROCEDURE — 99215 OFFICE O/P EST HI 40 MIN: CPT

## 2023-01-13 ENCOUNTER — RESULT REVIEW (OUTPATIENT)
Age: 33
End: 2023-01-13

## 2023-01-13 ENCOUNTER — APPOINTMENT (OUTPATIENT)
Dept: INFUSION THERAPY | Facility: HOSPITAL | Age: 33
End: 2023-01-13

## 2023-01-13 LAB
ALBUMIN SERPL ELPH-MCNC: 4.5 G/DL — SIGNIFICANT CHANGE UP (ref 3.3–5)
ALP SERPL-CCNC: 102 U/L — SIGNIFICANT CHANGE UP (ref 40–120)
ALT FLD-CCNC: 24 U/L — SIGNIFICANT CHANGE UP (ref 10–45)
ANION GAP SERPL CALC-SCNC: 13 MMOL/L — SIGNIFICANT CHANGE UP (ref 5–17)
AST SERPL-CCNC: 21 U/L — SIGNIFICANT CHANGE UP (ref 10–40)
BASOPHILS # BLD AUTO: 0.03 K/UL — SIGNIFICANT CHANGE UP (ref 0–0.2)
BASOPHILS NFR BLD AUTO: 0.5 % — SIGNIFICANT CHANGE UP (ref 0–2)
BILIRUB SERPL-MCNC: 0.2 MG/DL — SIGNIFICANT CHANGE UP (ref 0.2–1.2)
BUN SERPL-MCNC: 9 MG/DL — SIGNIFICANT CHANGE UP (ref 7–23)
CALCIUM SERPL-MCNC: 9.6 MG/DL — SIGNIFICANT CHANGE UP (ref 8.4–10.5)
CHLORIDE SERPL-SCNC: 104 MMOL/L — SIGNIFICANT CHANGE UP (ref 96–108)
CO2 SERPL-SCNC: 23 MMOL/L — SIGNIFICANT CHANGE UP (ref 22–31)
CREAT SERPL-MCNC: 0.65 MG/DL — SIGNIFICANT CHANGE UP (ref 0.5–1.3)
EGFR: 118 ML/MIN/1.73M2 — SIGNIFICANT CHANGE UP
EOSINOPHIL # BLD AUTO: 0.06 K/UL — SIGNIFICANT CHANGE UP (ref 0–0.5)
EOSINOPHIL NFR BLD AUTO: 1 % — SIGNIFICANT CHANGE UP (ref 0–6)
GLUCOSE SERPL-MCNC: 85 MG/DL — SIGNIFICANT CHANGE UP (ref 70–99)
HCT VFR BLD CALC: 34.2 % — LOW (ref 34.5–45)
HGB BLD-MCNC: 10.9 G/DL — LOW (ref 11.5–15.5)
IMM GRANULOCYTES NFR BLD AUTO: 0.3 % — SIGNIFICANT CHANGE UP (ref 0–0.9)
LYMPHOCYTES # BLD AUTO: 2 K/UL — SIGNIFICANT CHANGE UP (ref 1–3.3)
LYMPHOCYTES # BLD AUTO: 33.7 % — SIGNIFICANT CHANGE UP (ref 13–44)
MAGNESIUM SERPL-MCNC: 1.8 MG/DL — SIGNIFICANT CHANGE UP (ref 1.6–2.6)
MCHC RBC-ENTMCNC: 26.8 PG — LOW (ref 27–34)
MCHC RBC-ENTMCNC: 31.9 G/DL — LOW (ref 32–36)
MCV RBC AUTO: 84 FL — SIGNIFICANT CHANGE UP (ref 80–100)
MONOCYTES # BLD AUTO: 0.33 K/UL — SIGNIFICANT CHANGE UP (ref 0–0.9)
MONOCYTES NFR BLD AUTO: 5.6 % — SIGNIFICANT CHANGE UP (ref 2–14)
NEUTROPHILS # BLD AUTO: 3.49 K/UL — SIGNIFICANT CHANGE UP (ref 1.8–7.4)
NEUTROPHILS NFR BLD AUTO: 58.9 % — SIGNIFICANT CHANGE UP (ref 43–77)
NRBC # BLD: 0 /100 WBCS — SIGNIFICANT CHANGE UP (ref 0–0)
PLATELET # BLD AUTO: 395 K/UL — SIGNIFICANT CHANGE UP (ref 150–400)
POTASSIUM SERPL-MCNC: 4.3 MMOL/L — SIGNIFICANT CHANGE UP (ref 3.5–5.3)
POTASSIUM SERPL-SCNC: 4.3 MMOL/L — SIGNIFICANT CHANGE UP (ref 3.5–5.3)
PROT SERPL-MCNC: 6.6 G/DL — SIGNIFICANT CHANGE UP (ref 6–8.3)
RBC # BLD: 4.07 M/UL — SIGNIFICANT CHANGE UP (ref 3.8–5.2)
RBC # FLD: 14.6 % — HIGH (ref 10.3–14.5)
SODIUM SERPL-SCNC: 140 MMOL/L — SIGNIFICANT CHANGE UP (ref 135–145)
WBC # BLD: 5.93 K/UL — SIGNIFICANT CHANGE UP (ref 3.8–10.5)
WBC # FLD AUTO: 5.93 K/UL — SIGNIFICANT CHANGE UP (ref 3.8–10.5)

## 2023-01-18 ENCOUNTER — APPOINTMENT (OUTPATIENT)
Dept: CARDIOLOGY | Facility: CLINIC | Age: 33
End: 2023-01-18
Payer: COMMERCIAL

## 2023-01-18 DIAGNOSIS — C50.411 MALIGNANT NEOPLASM OF UPPER-OUTER QUADRANT OF RIGHT FEMALE BREAST: ICD-10-CM

## 2023-01-18 PROCEDURE — 93306 TTE W/DOPPLER COMPLETE: CPT

## 2023-01-18 PROCEDURE — 93356 MYOCRD STRAIN IMG SPCKL TRCK: CPT

## 2023-01-19 ENCOUNTER — NON-APPOINTMENT (OUTPATIENT)
Age: 33
End: 2023-01-19

## 2023-01-20 ENCOUNTER — RESULT REVIEW (OUTPATIENT)
Age: 33
End: 2023-01-20

## 2023-01-20 ENCOUNTER — APPOINTMENT (OUTPATIENT)
Dept: HEMATOLOGY ONCOLOGY | Facility: CLINIC | Age: 33
End: 2023-01-20

## 2023-01-20 ENCOUNTER — APPOINTMENT (OUTPATIENT)
Dept: INFUSION THERAPY | Facility: HOSPITAL | Age: 33
End: 2023-01-20

## 2023-01-20 ENCOUNTER — APPOINTMENT (OUTPATIENT)
Dept: HEMATOLOGY ONCOLOGY | Facility: CLINIC | Age: 33
End: 2023-01-20
Payer: SELF-PAY

## 2023-01-20 VITALS
DIASTOLIC BLOOD PRESSURE: 94 MMHG | TEMPERATURE: 97.5 F | OXYGEN SATURATION: 99 % | WEIGHT: 203.93 LBS | RESPIRATION RATE: 16 BRPM | BODY MASS INDEX: 33.93 KG/M2 | HEART RATE: 69 BPM | SYSTOLIC BLOOD PRESSURE: 136 MMHG

## 2023-01-20 LAB
BASOPHILS # BLD AUTO: 0.02 K/UL — SIGNIFICANT CHANGE UP (ref 0–0.2)
BASOPHILS NFR BLD AUTO: 0.4 % — SIGNIFICANT CHANGE UP (ref 0–2)
EOSINOPHIL # BLD AUTO: 0.05 K/UL — SIGNIFICANT CHANGE UP (ref 0–0.5)
EOSINOPHIL NFR BLD AUTO: 0.9 % — SIGNIFICANT CHANGE UP (ref 0–6)
HCT VFR BLD CALC: 32.2 % — LOW (ref 34.5–45)
HGB BLD-MCNC: 10.4 G/DL — LOW (ref 11.5–15.5)
IMM GRANULOCYTES NFR BLD AUTO: 0.6 % — SIGNIFICANT CHANGE UP (ref 0–0.9)
LYMPHOCYTES # BLD AUTO: 1.76 K/UL — SIGNIFICANT CHANGE UP (ref 1–3.3)
LYMPHOCYTES # BLD AUTO: 32.7 % — SIGNIFICANT CHANGE UP (ref 13–44)
MCHC RBC-ENTMCNC: 26.7 PG — LOW (ref 27–34)
MCHC RBC-ENTMCNC: 32.3 G/DL — SIGNIFICANT CHANGE UP (ref 32–36)
MCV RBC AUTO: 82.6 FL — SIGNIFICANT CHANGE UP (ref 80–100)
MONOCYTES # BLD AUTO: 0.37 K/UL — SIGNIFICANT CHANGE UP (ref 0–0.9)
MONOCYTES NFR BLD AUTO: 6.9 % — SIGNIFICANT CHANGE UP (ref 2–14)
NEUTROPHILS # BLD AUTO: 3.16 K/UL — SIGNIFICANT CHANGE UP (ref 1.8–7.4)
NEUTROPHILS NFR BLD AUTO: 58.5 % — SIGNIFICANT CHANGE UP (ref 43–77)
NRBC # BLD: 0 /100 WBCS — SIGNIFICANT CHANGE UP (ref 0–0)
PLATELET # BLD AUTO: 318 K/UL — SIGNIFICANT CHANGE UP (ref 150–400)
RBC # BLD: 3.9 M/UL — SIGNIFICANT CHANGE UP (ref 3.8–5.2)
RBC # FLD: 15 % — HIGH (ref 10.3–14.5)
WBC # BLD: 5.39 K/UL — SIGNIFICANT CHANGE UP (ref 3.8–10.5)
WBC # FLD AUTO: 5.39 K/UL — SIGNIFICANT CHANGE UP (ref 3.8–10.5)

## 2023-01-20 PROCEDURE — 99215 OFFICE O/P EST HI 40 MIN: CPT

## 2023-01-24 NOTE — DISCHARGE NOTE PROVIDER - NSDCCPTREATMENT_GEN_ALL_CORE_FT
PRINCIPAL PROCEDURE  Procedure: Modified right radical mastectomy  Findings and Treatment:        Glycopyrrolate Counseling:  I discussed with the patient the risks of glycopyrrolate including but not limited to skin rash, drowsiness, dry mouth, difficulty urinating, and blurred vision.

## 2023-01-27 ENCOUNTER — RESULT REVIEW (OUTPATIENT)
Age: 33
End: 2023-01-27

## 2023-01-27 ENCOUNTER — APPOINTMENT (OUTPATIENT)
Dept: INFUSION THERAPY | Facility: HOSPITAL | Age: 33
End: 2023-01-27

## 2023-01-27 LAB
ALBUMIN SERPL ELPH-MCNC: 4.7 G/DL — SIGNIFICANT CHANGE UP (ref 3.3–5)
ALP SERPL-CCNC: 97 U/L — SIGNIFICANT CHANGE UP (ref 40–120)
ALT FLD-CCNC: 21 U/L — SIGNIFICANT CHANGE UP (ref 10–45)
ANION GAP SERPL CALC-SCNC: 13 MMOL/L — SIGNIFICANT CHANGE UP (ref 5–17)
AST SERPL-CCNC: 21 U/L — SIGNIFICANT CHANGE UP (ref 10–40)
BASOPHILS # BLD AUTO: 0.03 K/UL — SIGNIFICANT CHANGE UP (ref 0–0.2)
BASOPHILS NFR BLD AUTO: 0.5 % — SIGNIFICANT CHANGE UP (ref 0–2)
BILIRUB SERPL-MCNC: 0.2 MG/DL — SIGNIFICANT CHANGE UP (ref 0.2–1.2)
BUN SERPL-MCNC: 10 MG/DL — SIGNIFICANT CHANGE UP (ref 7–23)
CALCIUM SERPL-MCNC: 9.6 MG/DL — SIGNIFICANT CHANGE UP (ref 8.4–10.5)
CHLORIDE SERPL-SCNC: 104 MMOL/L — SIGNIFICANT CHANGE UP (ref 96–108)
CO2 SERPL-SCNC: 26 MMOL/L — SIGNIFICANT CHANGE UP (ref 22–31)
CREAT SERPL-MCNC: 0.65 MG/DL — SIGNIFICANT CHANGE UP (ref 0.5–1.3)
EGFR: 118 ML/MIN/1.73M2 — SIGNIFICANT CHANGE UP
EOSINOPHIL # BLD AUTO: 0.05 K/UL — SIGNIFICANT CHANGE UP (ref 0–0.5)
EOSINOPHIL NFR BLD AUTO: 0.8 % — SIGNIFICANT CHANGE UP (ref 0–6)
GLUCOSE SERPL-MCNC: 80 MG/DL — SIGNIFICANT CHANGE UP (ref 70–99)
HCT VFR BLD CALC: 32 % — LOW (ref 34.5–45)
HGB BLD-MCNC: 10.5 G/DL — LOW (ref 11.5–15.5)
IMM GRANULOCYTES NFR BLD AUTO: 0.7 % — SIGNIFICANT CHANGE UP (ref 0–0.9)
LYMPHOCYTES # BLD AUTO: 2.5 K/UL — SIGNIFICANT CHANGE UP (ref 1–3.3)
LYMPHOCYTES # BLD AUTO: 41.5 % — SIGNIFICANT CHANGE UP (ref 13–44)
MAGNESIUM SERPL-MCNC: 1.9 MG/DL — SIGNIFICANT CHANGE UP (ref 1.6–2.6)
MCHC RBC-ENTMCNC: 26.5 PG — LOW (ref 27–34)
MCHC RBC-ENTMCNC: 32.8 G/DL — SIGNIFICANT CHANGE UP (ref 32–36)
MCV RBC AUTO: 80.8 FL — SIGNIFICANT CHANGE UP (ref 80–100)
MONOCYTES # BLD AUTO: 0.46 K/UL — SIGNIFICANT CHANGE UP (ref 0–0.9)
MONOCYTES NFR BLD AUTO: 7.6 % — SIGNIFICANT CHANGE UP (ref 2–14)
NEUTROPHILS # BLD AUTO: 2.95 K/UL — SIGNIFICANT CHANGE UP (ref 1.8–7.4)
NEUTROPHILS NFR BLD AUTO: 48.9 % — SIGNIFICANT CHANGE UP (ref 43–77)
NRBC # BLD: 0 /100 WBCS — SIGNIFICANT CHANGE UP (ref 0–0)
PLATELET # BLD AUTO: 339 K/UL — SIGNIFICANT CHANGE UP (ref 150–400)
POTASSIUM SERPL-MCNC: 3.7 MMOL/L — SIGNIFICANT CHANGE UP (ref 3.5–5.3)
POTASSIUM SERPL-SCNC: 3.7 MMOL/L — SIGNIFICANT CHANGE UP (ref 3.5–5.3)
PROT SERPL-MCNC: 6.5 G/DL — SIGNIFICANT CHANGE UP (ref 6–8.3)
RBC # BLD: 3.96 M/UL — SIGNIFICANT CHANGE UP (ref 3.8–5.2)
RBC # FLD: 14.9 % — HIGH (ref 10.3–14.5)
SODIUM SERPL-SCNC: 142 MMOL/L — SIGNIFICANT CHANGE UP (ref 135–145)
WBC # BLD: 6.03 K/UL — SIGNIFICANT CHANGE UP (ref 3.8–10.5)
WBC # FLD AUTO: 6.03 K/UL — SIGNIFICANT CHANGE UP (ref 3.8–10.5)

## 2023-02-03 ENCOUNTER — APPOINTMENT (OUTPATIENT)
Dept: HEMATOLOGY ONCOLOGY | Facility: CLINIC | Age: 33
End: 2023-02-03
Payer: COMMERCIAL

## 2023-02-03 ENCOUNTER — APPOINTMENT (OUTPATIENT)
Dept: INFUSION THERAPY | Facility: HOSPITAL | Age: 33
End: 2023-02-03

## 2023-02-03 PROCEDURE — 99215 OFFICE O/P EST HI 40 MIN: CPT

## 2023-02-08 ENCOUNTER — APPOINTMENT (OUTPATIENT)
Dept: RADIATION ONCOLOGY | Facility: CLINIC | Age: 33
End: 2023-02-08
Payer: COMMERCIAL

## 2023-02-08 VITALS
TEMPERATURE: 97.7 F | RESPIRATION RATE: 17 BRPM | OXYGEN SATURATION: 98 % | DIASTOLIC BLOOD PRESSURE: 86 MMHG | HEIGHT: 65 IN | WEIGHT: 204.88 LBS | SYSTOLIC BLOOD PRESSURE: 133 MMHG | BODY MASS INDEX: 34.13 KG/M2 | HEART RATE: 70 BPM

## 2023-02-08 PROCEDURE — 77334 RADIATION TREATMENT AID(S): CPT | Mod: 26,59

## 2023-02-08 PROCEDURE — 99214 OFFICE O/P EST MOD 30 MIN: CPT | Mod: 25

## 2023-02-08 PROCEDURE — 77290 THER RAD SIMULAJ FIELD CPLX: CPT | Mod: 26

## 2023-02-15 ENCOUNTER — OUTPATIENT (OUTPATIENT)
Dept: OUTPATIENT SERVICES | Facility: HOSPITAL | Age: 33
LOS: 1 days | Discharge: HOME | End: 2023-02-15

## 2023-02-15 DIAGNOSIS — C50.919 MALIGNANT NEOPLASM OF UNSPECIFIED SITE OF UNSPECIFIED FEMALE BREAST: ICD-10-CM

## 2023-02-15 DIAGNOSIS — Z90.11 ACQUIRED ABSENCE OF RIGHT BREAST AND NIPPLE: Chronic | ICD-10-CM

## 2023-02-15 DIAGNOSIS — Z90.89 ACQUIRED ABSENCE OF OTHER ORGANS: Chronic | ICD-10-CM

## 2023-02-15 PROCEDURE — 77300 RADIATION THERAPY DOSE PLAN: CPT | Mod: 26

## 2023-02-15 PROCEDURE — 77334 RADIATION TREATMENT AID(S): CPT | Mod: 26

## 2023-02-15 PROCEDURE — 77295 3-D RADIOTHERAPY PLAN: CPT | Mod: 26

## 2023-02-17 ENCOUNTER — RX CHANGE (OUTPATIENT)
Age: 33
End: 2023-02-17

## 2023-02-17 RX ORDER — VALACYCLOVIR 500 MG/1
500 TABLET, FILM COATED ORAL
Qty: 30 | Refills: 3 | Status: DISCONTINUED | COMMUNITY
Start: 2018-12-28 | End: 2023-02-17

## 2023-02-24 ENCOUNTER — APPOINTMENT (OUTPATIENT)
Dept: INFUSION THERAPY | Facility: HOSPITAL | Age: 33
End: 2023-02-24

## 2023-02-27 DIAGNOSIS — R11.2 NAUSEA WITH VOMITING, UNSPECIFIED: ICD-10-CM

## 2023-02-27 DIAGNOSIS — Z51.11 ENCOUNTER FOR ANTINEOPLASTIC CHEMOTHERAPY: ICD-10-CM

## 2023-03-03 ENCOUNTER — APPOINTMENT (OUTPATIENT)
Dept: INFUSION THERAPY | Facility: HOSPITAL | Age: 33
End: 2023-03-03

## 2023-03-03 PROCEDURE — 77280 THER RAD SIMULAJ FIELD SMPL: CPT | Mod: 26

## 2023-03-08 ENCOUNTER — NON-APPOINTMENT (OUTPATIENT)
Age: 33
End: 2023-03-08

## 2023-03-08 ENCOUNTER — APPOINTMENT (OUTPATIENT)
Dept: CV DIAGNOSITCS | Facility: HOSPITAL | Age: 33
End: 2023-03-08

## 2023-03-10 PROCEDURE — 77427 RADIATION TX MANAGEMENT X5: CPT

## 2023-03-15 ENCOUNTER — NON-APPOINTMENT (OUTPATIENT)
Age: 33
End: 2023-03-15

## 2023-03-17 ENCOUNTER — APPOINTMENT (OUTPATIENT)
Dept: INFUSION THERAPY | Facility: HOSPITAL | Age: 33
End: 2023-03-17

## 2023-03-17 ENCOUNTER — APPOINTMENT (OUTPATIENT)
Dept: HEMATOLOGY ONCOLOGY | Facility: CLINIC | Age: 33
End: 2023-03-17
Payer: COMMERCIAL

## 2023-03-17 VITALS
BODY MASS INDEX: 31.04 KG/M2 | HEART RATE: 69 BPM | SYSTOLIC BLOOD PRESSURE: 124 MMHG | RESPIRATION RATE: 17 BRPM | DIASTOLIC BLOOD PRESSURE: 87 MMHG | TEMPERATURE: 97.2 F | OXYGEN SATURATION: 98 % | WEIGHT: 186.29 LBS | HEIGHT: 65 IN

## 2023-03-17 PROCEDURE — 77427 RADIATION TX MANAGEMENT X5: CPT

## 2023-03-17 PROCEDURE — 99215 OFFICE O/P EST HI 40 MIN: CPT

## 2023-03-21 PROCEDURE — 77280 THER RAD SIMULAJ FIELD SMPL: CPT | Mod: 26

## 2023-03-22 ENCOUNTER — NON-APPOINTMENT (OUTPATIENT)
Age: 33
End: 2023-03-22

## 2023-03-24 PROCEDURE — 77427 RADIATION TX MANAGEMENT X5: CPT

## 2023-03-24 NOTE — H&P PST ADULT - NSANTHOSAYNRD_GEN_A_CORE
Strong peripheral pulses No. MARCOS screening performed.  STOP BANG Legend: 0-2 = LOW Risk; 3-4 = INTERMEDIATE Risk; 5-8 = HIGH Risk

## 2023-03-28 PROCEDURE — 77321 SPECIAL TELETX PORT PLAN: CPT | Mod: 26

## 2023-03-29 ENCOUNTER — NON-APPOINTMENT (OUTPATIENT)
Age: 33
End: 2023-03-29

## 2023-03-30 ENCOUNTER — APPOINTMENT (OUTPATIENT)
Dept: CARDIOLOGY | Facility: CLINIC | Age: 33
End: 2023-03-30
Payer: COMMERCIAL

## 2023-03-30 PROCEDURE — 93356 MYOCRD STRAIN IMG SPCKL TRCK: CPT

## 2023-03-30 PROCEDURE — 93306 TTE W/DOPPLER COMPLETE: CPT

## 2023-03-31 ENCOUNTER — APPOINTMENT (OUTPATIENT)
Dept: INFUSION THERAPY | Facility: HOSPITAL | Age: 33
End: 2023-03-31

## 2023-03-31 PROCEDURE — 77427 RADIATION TX MANAGEMENT X5: CPT

## 2023-04-04 ENCOUNTER — NON-APPOINTMENT (OUTPATIENT)
Age: 33
End: 2023-04-04

## 2023-04-07 ENCOUNTER — APPOINTMENT (OUTPATIENT)
Dept: INFUSION THERAPY | Facility: HOSPITAL | Age: 33
End: 2023-04-07

## 2023-04-21 ENCOUNTER — OUTPATIENT (OUTPATIENT)
Dept: OUTPATIENT SERVICES | Facility: HOSPITAL | Age: 33
LOS: 1 days | Discharge: HOME | End: 2023-04-21
Payer: COMMERCIAL

## 2023-04-21 DIAGNOSIS — Z90.89 ACQUIRED ABSENCE OF OTHER ORGANS: Chronic | ICD-10-CM

## 2023-04-21 DIAGNOSIS — Z90.11 ACQUIRED ABSENCE OF RIGHT BREAST AND NIPPLE: Chronic | ICD-10-CM

## 2023-04-21 DIAGNOSIS — C50.919 MALIGNANT NEOPLASM OF UNSPECIFIED SITE OF UNSPECIFIED FEMALE BREAST: ICD-10-CM

## 2023-04-28 ENCOUNTER — APPOINTMENT (OUTPATIENT)
Dept: HEMATOLOGY ONCOLOGY | Facility: CLINIC | Age: 33
End: 2023-04-28

## 2023-04-28 ENCOUNTER — RESULT REVIEW (OUTPATIENT)
Age: 33
End: 2023-04-28

## 2023-04-28 ENCOUNTER — APPOINTMENT (OUTPATIENT)
Dept: HEMATOLOGY ONCOLOGY | Facility: CLINIC | Age: 33
End: 2023-04-28
Payer: COMMERCIAL

## 2023-04-28 ENCOUNTER — APPOINTMENT (OUTPATIENT)
Dept: INFUSION THERAPY | Facility: HOSPITAL | Age: 33
End: 2023-04-28

## 2023-04-28 VITALS
WEIGHT: 189.6 LBS | RESPIRATION RATE: 16 BRPM | SYSTOLIC BLOOD PRESSURE: 138 MMHG | TEMPERATURE: 97 F | BODY MASS INDEX: 31.55 KG/M2 | DIASTOLIC BLOOD PRESSURE: 101 MMHG | OXYGEN SATURATION: 99 % | HEART RATE: 88 BPM

## 2023-04-28 LAB
BASOPHILS # BLD AUTO: 0.03 K/UL — SIGNIFICANT CHANGE UP (ref 0–0.2)
BASOPHILS NFR BLD AUTO: 0.4 % — SIGNIFICANT CHANGE UP (ref 0–2)
EOSINOPHIL # BLD AUTO: 0.18 K/UL — SIGNIFICANT CHANGE UP (ref 0–0.5)
EOSINOPHIL NFR BLD AUTO: 2.5 % — SIGNIFICANT CHANGE UP (ref 0–6)
HCT VFR BLD CALC: 33.8 % — LOW (ref 34.5–45)
HGB BLD-MCNC: 10.9 G/DL — LOW (ref 11.5–15.5)
IMM GRANULOCYTES NFR BLD AUTO: 0.3 % — SIGNIFICANT CHANGE UP (ref 0–0.9)
LYMPHOCYTES # BLD AUTO: 1.76 K/UL — SIGNIFICANT CHANGE UP (ref 1–3.3)
LYMPHOCYTES # BLD AUTO: 24.7 % — SIGNIFICANT CHANGE UP (ref 13–44)
MCHC RBC-ENTMCNC: 27.5 PG — SIGNIFICANT CHANGE UP (ref 27–34)
MCHC RBC-ENTMCNC: 32.2 G/DL — SIGNIFICANT CHANGE UP (ref 32–36)
MCV RBC AUTO: 85.1 FL — SIGNIFICANT CHANGE UP (ref 80–100)
MONOCYTES # BLD AUTO: 0.43 K/UL — SIGNIFICANT CHANGE UP (ref 0–0.9)
MONOCYTES NFR BLD AUTO: 6 % — SIGNIFICANT CHANGE UP (ref 2–14)
NEUTROPHILS # BLD AUTO: 4.71 K/UL — SIGNIFICANT CHANGE UP (ref 1.8–7.4)
NEUTROPHILS NFR BLD AUTO: 66.1 % — SIGNIFICANT CHANGE UP (ref 43–77)
NRBC # BLD: 0 /100 WBCS — SIGNIFICANT CHANGE UP (ref 0–0)
PLATELET # BLD AUTO: 274 K/UL — SIGNIFICANT CHANGE UP (ref 150–400)
RBC # BLD: 3.97 M/UL — SIGNIFICANT CHANGE UP (ref 3.8–5.2)
RBC # FLD: 14.7 % — HIGH (ref 10.3–14.5)
WBC # BLD: 7.13 K/UL — SIGNIFICANT CHANGE UP (ref 3.8–10.5)
WBC # FLD AUTO: 7.13 K/UL — SIGNIFICANT CHANGE UP (ref 3.8–10.5)

## 2023-04-28 PROCEDURE — 99215 OFFICE O/P EST HI 40 MIN: CPT

## 2023-04-28 NOTE — PATIENT PROFILE OB - CAREGIVER
Declines This was a shared visit with the ZEB. I reviewed and verified the documentation and independently performed the documented:

## 2023-04-29 LAB
ALBUMIN SERPL ELPH-MCNC: 4.7 G/DL — SIGNIFICANT CHANGE UP (ref 3.3–5)
ALP SERPL-CCNC: 107 U/L — SIGNIFICANT CHANGE UP (ref 40–120)
ALT FLD-CCNC: 30 U/L — SIGNIFICANT CHANGE UP (ref 10–45)
ANION GAP SERPL CALC-SCNC: 21 MMOL/L — HIGH (ref 5–17)
AST SERPL-CCNC: 34 U/L — SIGNIFICANT CHANGE UP (ref 10–40)
BILIRUB SERPL-MCNC: 0.2 MG/DL — SIGNIFICANT CHANGE UP (ref 0.2–1.2)
BUN SERPL-MCNC: 10 MG/DL — SIGNIFICANT CHANGE UP (ref 7–23)
CALCIUM SERPL-MCNC: 9.7 MG/DL — SIGNIFICANT CHANGE UP (ref 8.4–10.5)
CEA SERPL-MCNC: 0.8 NG/ML — SIGNIFICANT CHANGE UP (ref 0–3.8)
CHLORIDE SERPL-SCNC: 102 MMOL/L — SIGNIFICANT CHANGE UP (ref 96–108)
CO2 SERPL-SCNC: 21 MMOL/L — LOW (ref 22–31)
CREAT SERPL-MCNC: 0.62 MG/DL — SIGNIFICANT CHANGE UP (ref 0.5–1.3)
EGFR: 120 ML/MIN/1.73M2 — SIGNIFICANT CHANGE UP
GLUCOSE SERPL-MCNC: 101 MG/DL — HIGH (ref 70–99)
POTASSIUM SERPL-MCNC: 3.8 MMOL/L — SIGNIFICANT CHANGE UP (ref 3.5–5.3)
POTASSIUM SERPL-SCNC: 3.8 MMOL/L — SIGNIFICANT CHANGE UP (ref 3.5–5.3)
PROT SERPL-MCNC: 7.2 G/DL — SIGNIFICANT CHANGE UP (ref 6–8.3)
SODIUM SERPL-SCNC: 144 MMOL/L — SIGNIFICANT CHANGE UP (ref 135–145)

## 2023-05-01 DIAGNOSIS — Z51.11 ENCOUNTER FOR ANTINEOPLASTIC CHEMOTHERAPY: ICD-10-CM

## 2023-05-01 LAB
CANCER AG27-29 SERPL-ACNC: 11.1 U/ML — SIGNIFICANT CHANGE UP (ref 0–38.6)

## 2023-05-11 ENCOUNTER — APPOINTMENT (OUTPATIENT)
Dept: RADIATION ONCOLOGY | Facility: CLINIC | Age: 33
End: 2023-05-11
Payer: COMMERCIAL

## 2023-05-11 PROCEDURE — 99024 POSTOP FOLLOW-UP VISIT: CPT

## 2023-05-19 ENCOUNTER — APPOINTMENT (OUTPATIENT)
Dept: INFUSION THERAPY | Facility: HOSPITAL | Age: 33
End: 2023-05-19

## 2023-05-22 DIAGNOSIS — R11.2 NAUSEA WITH VOMITING, UNSPECIFIED: ICD-10-CM

## 2023-05-27 ENCOUNTER — APPOINTMENT (OUTPATIENT)
Dept: INFUSION THERAPY | Facility: HOSPITAL | Age: 33
End: 2023-05-27

## 2023-06-02 ENCOUNTER — APPOINTMENT (OUTPATIENT)
Dept: CARDIOLOGY | Facility: CLINIC | Age: 33
End: 2023-06-02

## 2023-06-09 ENCOUNTER — APPOINTMENT (OUTPATIENT)
Dept: HEMATOLOGY ONCOLOGY | Facility: CLINIC | Age: 33
End: 2023-06-09
Payer: COMMERCIAL

## 2023-06-09 ENCOUNTER — APPOINTMENT (OUTPATIENT)
Dept: INFUSION THERAPY | Facility: HOSPITAL | Age: 33
End: 2023-06-09

## 2023-06-09 VITALS
HEART RATE: 79 BPM | BODY MASS INDEX: 30.82 KG/M2 | OXYGEN SATURATION: 98 % | SYSTOLIC BLOOD PRESSURE: 125 MMHG | TEMPERATURE: 97 F | DIASTOLIC BLOOD PRESSURE: 86 MMHG | WEIGHT: 185.19 LBS | RESPIRATION RATE: 16 BRPM

## 2023-06-09 DIAGNOSIS — Z71.9 COUNSELING, UNSPECIFIED: ICD-10-CM

## 2023-06-09 DIAGNOSIS — N93.9 ABNORMAL UTERINE AND VAGINAL BLEEDING, UNSPECIFIED: ICD-10-CM

## 2023-06-09 DIAGNOSIS — R21 RASH AND OTHER NONSPECIFIC SKIN ERUPTION: ICD-10-CM

## 2023-06-09 PROCEDURE — 99215 OFFICE O/P EST HI 40 MIN: CPT

## 2023-06-12 PROBLEM — R21 SKIN RASH: Status: ACTIVE | Noted: 2023-06-12

## 2023-06-12 PROBLEM — N93.9 VAGINAL BLEEDING: Status: ACTIVE | Noted: 2023-06-12

## 2023-06-12 PROBLEM — Z71.9 HEALTH EDUCATION/COUNSELING: Status: ACTIVE | Noted: 2023-01-12

## 2023-06-13 ENCOUNTER — NON-APPOINTMENT (OUTPATIENT)
Age: 33
End: 2023-06-13

## 2023-06-13 ENCOUNTER — OUTPATIENT (OUTPATIENT)
Dept: OUTPATIENT SERVICES | Facility: HOSPITAL | Age: 33
LOS: 1 days | Discharge: HOME | End: 2023-06-13

## 2023-06-13 DIAGNOSIS — Z90.89 ACQUIRED ABSENCE OF OTHER ORGANS: Chronic | ICD-10-CM

## 2023-06-13 DIAGNOSIS — C50.919 MALIGNANT NEOPLASM OF UNSPECIFIED SITE OF UNSPECIFIED FEMALE BREAST: ICD-10-CM

## 2023-06-13 DIAGNOSIS — Z90.11 ACQUIRED ABSENCE OF RIGHT BREAST AND NIPPLE: Chronic | ICD-10-CM

## 2023-06-16 ENCOUNTER — RESULT REVIEW (OUTPATIENT)
Age: 33
End: 2023-06-16

## 2023-06-16 ENCOUNTER — APPOINTMENT (OUTPATIENT)
Dept: INFUSION THERAPY | Facility: HOSPITAL | Age: 33
End: 2023-06-16

## 2023-06-16 ENCOUNTER — NON-APPOINTMENT (OUTPATIENT)
Age: 33
End: 2023-06-16

## 2023-06-17 LAB
ALBUMIN SERPL ELPH-MCNC: 4.1 G/DL — SIGNIFICANT CHANGE UP (ref 3.3–5)
ALP SERPL-CCNC: 85 U/L — SIGNIFICANT CHANGE UP (ref 40–120)
ALT FLD-CCNC: 11 U/L — SIGNIFICANT CHANGE UP (ref 10–45)
ANION GAP SERPL CALC-SCNC: 12 MMOL/L — SIGNIFICANT CHANGE UP (ref 5–17)
AST SERPL-CCNC: 17 U/L — SIGNIFICANT CHANGE UP (ref 10–40)
BILIRUB SERPL-MCNC: 0.3 MG/DL — SIGNIFICANT CHANGE UP (ref 0.2–1.2)
BUN SERPL-MCNC: 21 MG/DL — SIGNIFICANT CHANGE UP (ref 7–23)
CALCIUM SERPL-MCNC: 8.4 MG/DL — SIGNIFICANT CHANGE UP (ref 8.4–10.5)
CHLORIDE SERPL-SCNC: 108 MMOL/L — SIGNIFICANT CHANGE UP (ref 96–108)
CO2 SERPL-SCNC: 23 MMOL/L — SIGNIFICANT CHANGE UP (ref 22–31)
CREAT SERPL-MCNC: 0.72 MG/DL — SIGNIFICANT CHANGE UP (ref 0.5–1.3)
EGFR: 112 ML/MIN/1.73M2 — SIGNIFICANT CHANGE UP
GLUCOSE SERPL-MCNC: 95 MG/DL — SIGNIFICANT CHANGE UP (ref 70–99)
POTASSIUM SERPL-MCNC: 3.3 MMOL/L — LOW (ref 3.5–5.3)
POTASSIUM SERPL-SCNC: 3.3 MMOL/L — LOW (ref 3.5–5.3)
PROT SERPL-MCNC: 6.4 G/DL — SIGNIFICANT CHANGE UP (ref 6–8.3)
SODIUM SERPL-SCNC: 142 MMOL/L — SIGNIFICANT CHANGE UP (ref 135–145)

## 2023-06-19 DIAGNOSIS — E86.0 DEHYDRATION: ICD-10-CM

## 2023-06-19 DIAGNOSIS — R11.2 NAUSEA WITH VOMITING, UNSPECIFIED: ICD-10-CM

## 2023-06-21 ENCOUNTER — APPOINTMENT (OUTPATIENT)
Dept: GYNECOLOGIC ONCOLOGY | Facility: CLINIC | Age: 33
End: 2023-06-21
Payer: COMMERCIAL

## 2023-06-21 VITALS
WEIGHT: 185 LBS | SYSTOLIC BLOOD PRESSURE: 111 MMHG | HEIGHT: 65 IN | HEART RATE: 88 BPM | BODY MASS INDEX: 30.82 KG/M2 | DIASTOLIC BLOOD PRESSURE: 77 MMHG

## 2023-06-21 VITALS
DIASTOLIC BLOOD PRESSURE: 82 MMHG | SYSTOLIC BLOOD PRESSURE: 127 MMHG | WEIGHT: 160 LBS | HEIGHT: 65 IN | HEART RATE: 97 BPM | BODY MASS INDEX: 26.66 KG/M2

## 2023-06-21 PROCEDURE — 99204 OFFICE O/P NEW MOD 45 MIN: CPT

## 2023-06-23 ENCOUNTER — APPOINTMENT (OUTPATIENT)
Dept: CARDIOLOGY | Facility: CLINIC | Age: 33
End: 2023-06-23
Payer: COMMERCIAL

## 2023-06-23 ENCOUNTER — LABORATORY RESULT (OUTPATIENT)
Age: 33
End: 2023-06-23

## 2023-06-23 ENCOUNTER — APPOINTMENT (OUTPATIENT)
Dept: INFUSION THERAPY | Facility: HOSPITAL | Age: 33
End: 2023-06-23

## 2023-06-23 ENCOUNTER — APPOINTMENT (OUTPATIENT)
Dept: HEMATOLOGY ONCOLOGY | Facility: CLINIC | Age: 33
End: 2023-06-23
Payer: COMMERCIAL

## 2023-06-23 VITALS
OXYGEN SATURATION: 98 % | SYSTOLIC BLOOD PRESSURE: 101 MMHG | HEIGHT: 65 IN | BODY MASS INDEX: 31.22 KG/M2 | WEIGHT: 187.39 LBS | RESPIRATION RATE: 17 BRPM | HEART RATE: 200 BPM | DIASTOLIC BLOOD PRESSURE: 70 MMHG

## 2023-06-23 VITALS — DIASTOLIC BLOOD PRESSURE: 61 MMHG | SYSTOLIC BLOOD PRESSURE: 79 MMHG

## 2023-06-23 VITALS — HEART RATE: 102 BPM

## 2023-06-23 PROCEDURE — 99215 OFFICE O/P EST HI 40 MIN: CPT

## 2023-06-23 PROCEDURE — 99205 OFFICE O/P NEW HI 60 MIN: CPT | Mod: 25

## 2023-06-23 PROCEDURE — 93010 ELECTROCARDIOGRAM REPORT: CPT

## 2023-06-23 PROCEDURE — 93000 ELECTROCARDIOGRAM COMPLETE: CPT

## 2023-06-25 LAB
ALBUMIN SERPL ELPH-MCNC: 3.9 G/DL
ALP BLD-CCNC: 76 U/L
ALT SERPL-CCNC: 11 U/L
ANION GAP SERPL CALC-SCNC: 12 MMOL/L
AST SERPL-CCNC: 13 U/L
BILIRUB SERPL-MCNC: 0.5 MG/DL
BUN SERPL-MCNC: 10 MG/DL
CALCIUM SERPL-MCNC: 8.5 MG/DL
CHLORIDE SERPL-SCNC: 107 MMOL/L
CO2 SERPL-SCNC: 24 MMOL/L
CREAT SERPL-MCNC: 0.63 MG/DL
EGFR: 119 ML/MIN/1.73M2
ESTIMATED AVERAGE GLUCOSE: 103 MG/DL
GLUCOSE SERPL-MCNC: 96 MG/DL
HBA1C MFR BLD HPLC: 5.2 %
NT-PROBNP SERPL-MCNC: 40 PG/ML
POTASSIUM SERPL-SCNC: 3.9 MMOL/L
PROT SERPL-MCNC: 5.9 G/DL
SODIUM SERPL-SCNC: 142 MMOL/L
TROPONIN-T, HIGH SENSITIVITY: 13 NG/L
TSH SERPL-ACNC: 0.06 UIU/ML

## 2023-06-26 ENCOUNTER — RESULT REVIEW (OUTPATIENT)
Age: 33
End: 2023-06-26

## 2023-06-26 LAB — APO LP(A) SERPL-MCNC: 19.5 NMOL/L

## 2023-06-28 ENCOUNTER — OUTPATIENT (OUTPATIENT)
Dept: OUTPATIENT SERVICES | Facility: HOSPITAL | Age: 33
LOS: 1 days | End: 2023-06-28

## 2023-06-28 VITALS
HEIGHT: 65.5 IN | HEART RATE: 84 BPM | OXYGEN SATURATION: 100 % | RESPIRATION RATE: 20 BRPM | SYSTOLIC BLOOD PRESSURE: 131 MMHG | DIASTOLIC BLOOD PRESSURE: 88 MMHG | TEMPERATURE: 98 F | WEIGHT: 186.95 LBS

## 2023-06-28 DIAGNOSIS — Z90.89 ACQUIRED ABSENCE OF OTHER ORGANS: Chronic | ICD-10-CM

## 2023-06-28 DIAGNOSIS — C50.911 MALIGNANT NEOPLASM OF UNSPECIFIED SITE OF RIGHT FEMALE BREAST: ICD-10-CM

## 2023-06-28 DIAGNOSIS — Z90.11 ACQUIRED ABSENCE OF RIGHT BREAST AND NIPPLE: Chronic | ICD-10-CM

## 2023-06-28 LAB
ALBUMIN SERPL ELPH-MCNC: 4.5 G/DL — SIGNIFICANT CHANGE UP (ref 3.3–5)
ALP SERPL-CCNC: 89 U/L — SIGNIFICANT CHANGE UP (ref 40–120)
ALT FLD-CCNC: 8 U/L — SIGNIFICANT CHANGE UP (ref 4–33)
ANION GAP SERPL CALC-SCNC: 11 MMOL/L — SIGNIFICANT CHANGE UP (ref 7–14)
AST SERPL-CCNC: 12 U/L — SIGNIFICANT CHANGE UP (ref 4–32)
BILIRUB SERPL-MCNC: 0.3 MG/DL — SIGNIFICANT CHANGE UP (ref 0.2–1.2)
BLD GP AB SCN SERPL QL: NEGATIVE — SIGNIFICANT CHANGE UP
BUN SERPL-MCNC: 11 MG/DL — SIGNIFICANT CHANGE UP (ref 7–23)
CALCIUM SERPL-MCNC: 10.1 MG/DL — SIGNIFICANT CHANGE UP (ref 8.4–10.5)
CHLORIDE SERPL-SCNC: 102 MMOL/L — SIGNIFICANT CHANGE UP (ref 98–107)
CO2 SERPL-SCNC: 29 MMOL/L — SIGNIFICANT CHANGE UP (ref 22–31)
CREAT SERPL-MCNC: 0.65 MG/DL — SIGNIFICANT CHANGE UP (ref 0.5–1.3)
EGFR: 118 ML/MIN/1.73M2 — SIGNIFICANT CHANGE UP
GLUCOSE SERPL-MCNC: 85 MG/DL — SIGNIFICANT CHANGE UP (ref 70–99)
HCG SERPL-ACNC: <1 MIU/ML — SIGNIFICANT CHANGE UP
HCT VFR BLD CALC: 36.5 % — SIGNIFICANT CHANGE UP (ref 34.5–45)
HGB BLD-MCNC: 11.6 G/DL — SIGNIFICANT CHANGE UP (ref 11.5–15.5)
MCHC RBC-ENTMCNC: 27.6 PG — SIGNIFICANT CHANGE UP (ref 27–34)
MCHC RBC-ENTMCNC: 31.8 GM/DL — LOW (ref 32–36)
MCV RBC AUTO: 86.7 FL — SIGNIFICANT CHANGE UP (ref 80–100)
NRBC # BLD: 0 /100 WBCS — SIGNIFICANT CHANGE UP (ref 0–0)
NRBC # FLD: 0 K/UL — SIGNIFICANT CHANGE UP (ref 0–0)
PLATELET # BLD AUTO: 333 K/UL — SIGNIFICANT CHANGE UP (ref 150–400)
POTASSIUM SERPL-MCNC: 4.6 MMOL/L — SIGNIFICANT CHANGE UP (ref 3.5–5.3)
POTASSIUM SERPL-SCNC: 4.6 MMOL/L — SIGNIFICANT CHANGE UP (ref 3.5–5.3)
PROT SERPL-MCNC: 7.4 G/DL — SIGNIFICANT CHANGE UP (ref 6–8.3)
RBC # BLD: 4.21 M/UL — SIGNIFICANT CHANGE UP (ref 3.8–5.2)
RBC # FLD: 12 % — SIGNIFICANT CHANGE UP (ref 10.3–14.5)
RH IG SCN BLD-IMP: POSITIVE — SIGNIFICANT CHANGE UP
SODIUM SERPL-SCNC: 142 MMOL/L — SIGNIFICANT CHANGE UP (ref 135–145)
WBC # BLD: 7.13 K/UL — SIGNIFICANT CHANGE UP (ref 3.8–10.5)
WBC # FLD AUTO: 7.13 K/UL — SIGNIFICANT CHANGE UP (ref 3.8–10.5)

## 2023-06-28 RX ORDER — SODIUM CHLORIDE 9 MG/ML
1000 INJECTION, SOLUTION INTRAVENOUS
Refills: 0 | Status: DISCONTINUED | OUTPATIENT
Start: 2023-06-30 | End: 2023-07-14

## 2023-06-28 NOTE — H&P PST ADULT - NSICDXPASTMEDICALHX_GEN_ALL_CORE_FT
PAST MEDICAL HISTORY:  Breast cancer, right     Malignant neoplasm of unspecified site of right female breast      (normal spontaneous vaginal delivery)

## 2023-06-28 NOTE — H&P PST ADULT - HISTORY OF PRESENT ILLNESS
30 yo female with preop dx. malignant neoplasm unspecified site right female breast presents to pre surgical testing.  Pt is currently pregnant 11 weeks, ANDI 12/27/22.  Pt reports she experienced right breast pain 3 weeks ago, s/p US biopsy, revealing right breast malignancy.  Pt is scheduled for right modified radical mastectomy magseed localization with reconstruction(pt states she is having tissue expander placement).   31 yo female with hx. malignant neoplasm unspecified site right female breast , s/p right modified radical mastectomy magseed localization with reconstruction with  she is having tissue expander placement 6/2022.  Pt had chemotherapy and now getting Immunotherapy every 3 weeks, had been on Lupron therapy and presently on Exemestane. Pt now scheduled for D&C, Laparoscopic Bilateral Salpingo-Oophorectomy tentatively for 6/30/23.

## 2023-06-28 NOTE — H&P PST ADULT - PROBLEM SELECTOR PLAN 1
Scheduled for D&C, Laparoscopic Bilateral Salpingo-Oophorectomy tentatively for 6/30/23.  Pre-op instructions provided. Pt given verbal and written instructions with teach back on chlorhexidine shampoo and Pepcid. Pt verbalized understanding with return demonstration.   Pending labs

## 2023-06-28 NOTE — H&P PST ADULT - NSICDXFAMILYHX_GEN_ALL_CORE_FT
FAMILY HISTORY:  Father  Still living? Yes, Estimated age: Age Unknown  FH: HTN (hypertension), Age at diagnosis: Age Unknown    Grandparent  Still living? No  Family history of diabetes mellitus (DM), Age at diagnosis: Age Unknown  FH: colon cancer, Age at diagnosis: Age Unknown

## 2023-06-29 ENCOUNTER — RESULT REVIEW (OUTPATIENT)
Age: 33
End: 2023-06-29

## 2023-06-29 ENCOUNTER — APPOINTMENT (OUTPATIENT)
Dept: INFUSION THERAPY | Facility: HOSPITAL | Age: 33
End: 2023-06-29

## 2023-06-29 ENCOUNTER — TRANSCRIPTION ENCOUNTER (OUTPATIENT)
Age: 33
End: 2023-06-29

## 2023-06-29 LAB
HCT VFR BLD CALC: 32.3 % — LOW (ref 34.5–45)
HGB BLD-MCNC: 10.3 G/DL — LOW (ref 11.5–15.5)
MCHC RBC-ENTMCNC: 27.2 PG — SIGNIFICANT CHANGE UP (ref 27–34)
MCHC RBC-ENTMCNC: 31.9 GM/DL — LOW (ref 32–36)
MCV RBC AUTO: 85.4 FL — SIGNIFICANT CHANGE UP (ref 80–100)
PLATELET # BLD AUTO: 330 K/UL — SIGNIFICANT CHANGE UP (ref 150–400)
RBC # BLD: 3.78 M/UL — LOW (ref 3.8–5.2)
RBC # FLD: 12.1 % — SIGNIFICANT CHANGE UP (ref 10.3–14.5)
WBC # BLD: 7.87 K/UL — SIGNIFICANT CHANGE UP (ref 3.8–10.5)
WBC # FLD AUTO: 7.87 K/UL — SIGNIFICANT CHANGE UP (ref 3.8–10.5)

## 2023-06-29 NOTE — ASU PATIENT PROFILE, ADULT - FALL HARM RISK - UNIVERSAL INTERVENTIONS
Bed in lowest position, wheels locked, appropriate side rails in place/Call bell, personal items and telephone in reach/Instruct patient to call for assistance before getting out of bed or chair/Non-slip footwear when patient is out of bed/[place] to call system/Physically safe environment - no spills, clutter or unnecessary equipment/Purposeful Proactive Rounding/Room/bathroom lighting operational, light cord in reach

## 2023-06-29 NOTE — ASU PATIENT PROFILE, ADULT - NSTOBACCONEVERSMOKERY/N_GEN_A
[FreeTextEntry1] : Nummular eczema.\par No signs of tinea, as patient is concerned about.\par cutivate cream bid.\par emollients.\par Education. No

## 2023-06-30 ENCOUNTER — APPOINTMENT (OUTPATIENT)
Dept: GYNECOLOGIC ONCOLOGY | Facility: HOSPITAL | Age: 33
End: 2023-06-30

## 2023-06-30 ENCOUNTER — RESULT REVIEW (OUTPATIENT)
Age: 33
End: 2023-06-30

## 2023-06-30 ENCOUNTER — OUTPATIENT (OUTPATIENT)
Dept: OUTPATIENT SERVICES | Facility: HOSPITAL | Age: 33
LOS: 1 days | Discharge: HOME | End: 2023-06-30
Payer: COMMERCIAL

## 2023-06-30 ENCOUNTER — TRANSCRIPTION ENCOUNTER (OUTPATIENT)
Age: 33
End: 2023-06-30

## 2023-06-30 VITALS
HEART RATE: 69 BPM | DIASTOLIC BLOOD PRESSURE: 67 MMHG | RESPIRATION RATE: 16 BRPM | OXYGEN SATURATION: 100 % | SYSTOLIC BLOOD PRESSURE: 105 MMHG

## 2023-06-30 VITALS
HEART RATE: 84 BPM | TEMPERATURE: 98 F | OXYGEN SATURATION: 100 % | DIASTOLIC BLOOD PRESSURE: 80 MMHG | RESPIRATION RATE: 16 BRPM | WEIGHT: 186.95 LBS | SYSTOLIC BLOOD PRESSURE: 121 MMHG | HEIGHT: 65.5 IN

## 2023-06-30 DIAGNOSIS — Z90.89 ACQUIRED ABSENCE OF OTHER ORGANS: Chronic | ICD-10-CM

## 2023-06-30 DIAGNOSIS — Z90.11 ACQUIRED ABSENCE OF RIGHT BREAST AND NIPPLE: Chronic | ICD-10-CM

## 2023-06-30 DIAGNOSIS — C50.911 MALIGNANT NEOPLASM OF UNSPECIFIED SITE OF RIGHT FEMALE BREAST: ICD-10-CM

## 2023-06-30 DIAGNOSIS — Z51.11 ENCOUNTER FOR ANTINEOPLASTIC CHEMOTHERAPY: ICD-10-CM

## 2023-06-30 LAB
ALBUMIN SERPL ELPH-MCNC: 4.1 G/DL — SIGNIFICANT CHANGE UP (ref 3.3–5)
ALP SERPL-CCNC: 87 U/L — SIGNIFICANT CHANGE UP (ref 40–120)
ALT FLD-CCNC: 11 U/L — SIGNIFICANT CHANGE UP (ref 10–45)
ANION GAP SERPL CALC-SCNC: 13 MMOL/L — SIGNIFICANT CHANGE UP (ref 5–17)
AST SERPL-CCNC: 15 U/L — SIGNIFICANT CHANGE UP (ref 10–40)
BILIRUB SERPL-MCNC: <0.2 MG/DL — SIGNIFICANT CHANGE UP (ref 0.2–1.2)
BUN SERPL-MCNC: 9 MG/DL — SIGNIFICANT CHANGE UP (ref 7–23)
CALCIUM SERPL-MCNC: 9 MG/DL — SIGNIFICANT CHANGE UP (ref 8.4–10.5)
CHLORIDE SERPL-SCNC: 103 MMOL/L — SIGNIFICANT CHANGE UP (ref 96–108)
CO2 SERPL-SCNC: 26 MMOL/L — SIGNIFICANT CHANGE UP (ref 22–31)
CREAT SERPL-MCNC: 0.6 MG/DL — SIGNIFICANT CHANGE UP (ref 0.5–1.3)
EGFR: 121 ML/MIN/1.73M2 — SIGNIFICANT CHANGE UP
GLUCOSE SERPL-MCNC: 100 MG/DL — HIGH (ref 70–99)
HCG SERPL-ACNC: <1 MIU/ML — SIGNIFICANT CHANGE UP
MAGNESIUM SERPL-MCNC: 2.1 MG/DL — SIGNIFICANT CHANGE UP (ref 1.6–2.6)
POTASSIUM SERPL-MCNC: 3.9 MMOL/L — SIGNIFICANT CHANGE UP (ref 3.5–5.3)
POTASSIUM SERPL-SCNC: 3.9 MMOL/L — SIGNIFICANT CHANGE UP (ref 3.5–5.3)
PROT SERPL-MCNC: 6.4 G/DL — SIGNIFICANT CHANGE UP (ref 6–8.3)
RH IG SCN BLD-IMP: POSITIVE — SIGNIFICANT CHANGE UP
SODIUM SERPL-SCNC: 142 MMOL/L — SIGNIFICANT CHANGE UP (ref 135–145)

## 2023-06-30 PROCEDURE — 58661 LAPAROSCOPY REMOVE ADNEXA: CPT

## 2023-06-30 PROCEDURE — 88342 IMHCHEM/IMCYTCHM 1ST ANTB: CPT | Mod: 26

## 2023-06-30 PROCEDURE — 88305 TISSUE EXAM BY PATHOLOGIST: CPT | Mod: 26

## 2023-06-30 PROCEDURE — 58120 DILATION AND CURETTAGE: CPT

## 2023-06-30 RX ORDER — ONDANSETRON 8 MG/1
4 TABLET, FILM COATED ORAL ONCE
Refills: 0 | Status: DISCONTINUED | OUTPATIENT
Start: 2023-06-30 | End: 2023-07-14

## 2023-06-30 RX ORDER — SODIUM CHLORIDE 9 MG/ML
1000 INJECTION, SOLUTION INTRAVENOUS
Refills: 0 | Status: DISCONTINUED | OUTPATIENT
Start: 2023-06-30 | End: 2023-07-14

## 2023-06-30 RX ORDER — OXYCODONE HYDROCHLORIDE 5 MG/1
1 TABLET ORAL
Qty: 5 | Refills: 0
Start: 2023-06-30

## 2023-06-30 RX ORDER — HYDROMORPHONE HYDROCHLORIDE 2 MG/ML
1 INJECTION INTRAMUSCULAR; INTRAVENOUS; SUBCUTANEOUS
Refills: 0 | Status: DISCONTINUED | OUTPATIENT
Start: 2023-06-30 | End: 2023-06-30

## 2023-06-30 RX ORDER — FENTANYL CITRATE 50 UG/ML
25 INJECTION INTRAVENOUS
Refills: 0 | Status: DISCONTINUED | OUTPATIENT
Start: 2023-06-30 | End: 2023-06-30

## 2023-06-30 RX ADMIN — SODIUM CHLORIDE 75 MILLILITER(S): 9 INJECTION, SOLUTION INTRAVENOUS at 10:25

## 2023-06-30 NOTE — ASU PREOP CHECKLIST - ASSESSMENT, HISTORY & PHYSICAL COMPLETED AND ON MEDICAL RECORD
Lester Briceño called looking for the referral orders for PT. It looks like they were sent to [facility] instead. Can we send them to [facility] instead? Please let Henry Fernández know at [phone]. done

## 2023-06-30 NOTE — ASU DISCHARGE PLAN (ADULT/PEDIATRIC) - FREQUENT HAND WASHING PREVENTS THE SPREAD OF INFECTION.
I mailed Frankie Pappas a letter to cancel his follow up on 2/17/2021.  I rescheduled him on 2/25/2021 @ 3pm. Statement Selected

## 2023-06-30 NOTE — ASU DISCHARGE PLAN (ADULT/PEDIATRIC) - ASU DC SPECIAL INSTRUCTIONSFT
Discharge Instructions:  1. Diet: advance as tolerated  2. Activity limited by:   - no running, heavy lifting, strenuous exercise until cleared by MD   - nothing in vagina (bath, swim, intercourse, douching, tampons) until cleared by MD   3. Medications:   - Ibuprofen 600mg every 6 hours as needed for pain  - Acetaminophen 500mg every 6 hours as needed for pain  - Oxycodone 5mg every 8 hours as needed for breakthrough pain.   4. Follow-up appointment - 2 weeks. Please call the office upon discharge to schedule your appointment if you do not have one already.   5. Precautions:  - Call the office or go to the ED if you have any of the followin) Fever >100.4 that does not resolve  2) Intractable pain  3) Heavy bleeding

## 2023-06-30 NOTE — CHART NOTE - NSCHARTNOTEFT_GEN_A_CORE
GYNECOLOGIC ONCOLOGY PA POST OP  NOTE    Patient seen and examined at bedside, without complaints. Pain well controlled. Patient denies headache, dizziness, nausea, vomiting, chest pain and sob. Walker removed in OR. patient ambulated to bathroom, voided 500cc. Patient is tolerating regular diet.       OBJECTIVE:     VITALS:  T(F): 97.4 (06-30-23 @ 10:35), Max: 98.4 (06-30-23 @ 06:29)  HR: 69 (06-30-23 @ 12:00) (59 - 93)  BP: 105/67 (06-30-23 @ 12:00) (103/67 - 127/85)  RR: 16 (06-30-23 @ 12:00) (12 - 18)  SpO2: 100% (06-30-23 @ 12:00) (92% - 100%)  Wt(kg): --    I&O's Summary    30 Jun 2023 07:01  -  30 Jun 2023 12:21  --------------------------------------------------------  IN: 505 mL / OUT: 500 mL / NET: 5 mL        MEDICATIONS  (STANDING):  lactated ringers. 1000 milliLiter(s) (30 mL/Hr) IV Continuous <Continuous>  lactated ringers. 1000 milliLiter(s) (40 mL/Hr) IV Continuous <Continuous>  lactated ringers. 1000 milliLiter(s) (75 mL/Hr) IV Continuous <Continuous>    MEDICATIONS  (PRN):  fentaNYL    Injectable 25 MICROGram(s) IV Push every 5 minutes PRN Moderate Pain (4 - 6)  HYDROmorphone  Injectable 1 milliGRAM(s) IV Push every 10 minutes PRN Severe Pain (7 - 10)  ondansetron Injectable 4 milliGRAM(s) IV Push once PRN Nausea and/or Vomiting      Physical Exam:  Constitutional: NAD  Pulmonary: clear to auscultation bilaterally   Cardiovascular: Regular rate and rhythm   Abdomen: soft, non-distended, appropriate tenderness, middle scope site with few blood stains, others are C/D/I   Extremities: no lower extremity edema or calve tenderness      LABS:                        10.3   7.87  )-----------( 330      ( 29 Jun 2023 17:00 )             32.3     06-29    142  |  103  |  9   ----------------------------<  100<H>  3.9   |  26  |  0.60    Ca    9.0      29 Jun 2023 17:00  Mg     2.1     06-29    TPro  6.4  /  Alb  4.1  /  TBili  <0.2  /  DBili  x   /  AST  15  /  ALT  11  /  AlkPhos  87  06-29      Urinalysis Basic - ( 29 Jun 2023 17:00 )    Color: x / Appearance: x / SG: x / pH: x  Gluc: 100 mg/dL / Ketone: x  / Bili: x / Urobili: x   Blood: x / Protein: x / Nitrite: x   Leuk Esterase: x / RBC: x / WBC x   Sq Epi: x / Non Sq Epi: x / Bacteria: x        ASSESSMENT/PLAN:  31 yo female s/p laparoscopic BSO and D&C meeting all PACU milestones for discharge. Discharge instructions reviewed with patient and . follow up with Dr. Machado in 2 weeks.

## 2023-06-30 NOTE — ASU DISCHARGE PLAN (ADULT/PEDIATRIC) - NURSING INSTRUCTIONS
DO NOT take any Tylenol (Acetaminophen) or narcotics containing Tylenol until after  3pm. You received Tylenol during your operation and it can cause damage to your liver if too much is taken within a 24 hour time period.   DO NOT take any Ibuprofen ,Advil or Aleeve until after  3pm. You received Toradol during your operation and it can cause damage if too much is taken within a 24 hour time period.

## 2023-06-30 NOTE — BRIEF OPERATIVE NOTE - OPERATION/FINDINGS
Exam under anesthesia demonstrated 6w anteverted uterus - mobile, smooth cervix, no palpable adnexal masses.   Pelvic examin Exam under anesthesia demonstrated 6w anteverted uterus - mobile, smooth cervix, no palpable adnexal masses.   Pelvic survey demonstrated grossly normal uterus, bilateral fallopian tubes, and ovaries. Exam under anesthesia demonstrated normal external genitalia,  6w anteverted uterus - mobile, smooth cervix, no palpable adnexal masses.   Pelvic survey demonstrated grossly normal uterus, bilateral fallopian tubes, and ovaries. Excellent hemostasis was obtained.

## 2023-06-30 NOTE — BRIEF OPERATIVE NOTE - NSICDXBRIEFPOSTOP_GEN_ALL_CORE_FT
POST-OP DIAGNOSIS:  Hormone receptor positive breast cancer, unspecified laterality 30-Jun-2023 15:01:17  Lana Bermudez

## 2023-06-30 NOTE — ASU DISCHARGE PLAN (ADULT/PEDIATRIC) - FOLLOW UP APPOINTMENTS
[facility] may also call Recovery Room (PACU) 24/7 @ [phone]/[facility]

## 2023-06-30 NOTE — BRIEF OPERATIVE NOTE - NSICDXBRIEFPROCEDURE_GEN_ALL_CORE_FT
PROCEDURES:  Laparoscopic bilateral salpingo-oophorectomy (BSO) 30-Jun-2023 14:57:55  Lana Bermudez  D and KAYKAY of uterus 30-Jun-2023 14:58:10  Lana Bermudez  Exam under anesthesia, pelvic 30-Jun-2023 14:58:49  Lana Bermudez

## 2023-06-30 NOTE — BRIEF OPERATIVE NOTE - NSICDXBRIEFPREOP_GEN_ALL_CORE_FT
PRE-OP DIAGNOSIS:  Hormone receptor positive breast cancer, unspecified laterality 30-Jun-2023 15:01:07  Lana Bermudez

## 2023-06-30 NOTE — ASU DISCHARGE PLAN (ADULT/PEDIATRIC) - CARE PROVIDER_API CALL
Minda Machado  Gynecologic Oncology  [address] [place] [id number]  Phone: [phone]  Fax: [phone]  Follow Up Time: 2 weeks

## 2023-06-30 NOTE — ASU PREOP CHECKLIST - PATIENT'S PERSONAL PROPERTY GIVEN TO
hsuband/friend/significant other /friend/significant other ; Glasses & Phone in ASU #10/friend/significant other

## 2023-07-03 ENCOUNTER — NON-APPOINTMENT (OUTPATIENT)
Age: 33
End: 2023-07-03

## 2023-07-05 ENCOUNTER — APPOINTMENT (OUTPATIENT)
Dept: ULTRASOUND IMAGING | Facility: CLINIC | Age: 33
End: 2023-07-05
Payer: COMMERCIAL

## 2023-07-05 ENCOUNTER — OUTPATIENT (OUTPATIENT)
Dept: OUTPATIENT SERVICES | Facility: HOSPITAL | Age: 33
LOS: 1 days | End: 2023-07-05
Payer: COMMERCIAL

## 2023-07-05 ENCOUNTER — APPOINTMENT (OUTPATIENT)
Dept: MAMMOGRAPHY | Facility: CLINIC | Age: 33
End: 2023-07-05
Payer: COMMERCIAL

## 2023-07-05 ENCOUNTER — APPOINTMENT (OUTPATIENT)
Dept: RADIOLOGY | Facility: CLINIC | Age: 33
End: 2023-07-05
Payer: COMMERCIAL

## 2023-07-05 ENCOUNTER — RESULT REVIEW (OUTPATIENT)
Age: 33
End: 2023-07-05

## 2023-07-05 DIAGNOSIS — Z90.89 ACQUIRED ABSENCE OF OTHER ORGANS: Chronic | ICD-10-CM

## 2023-07-05 DIAGNOSIS — Z90.11 ACQUIRED ABSENCE OF RIGHT BREAST AND NIPPLE: Chronic | ICD-10-CM

## 2023-07-05 DIAGNOSIS — C50.911 MALIGNANT NEOPLASM OF UNSPECIFIED SITE OF RIGHT FEMALE BREAST: ICD-10-CM

## 2023-07-05 DIAGNOSIS — Z00.8 ENCOUNTER FOR OTHER GENERAL EXAMINATION: ICD-10-CM

## 2023-07-05 PROCEDURE — G0279: CPT

## 2023-07-05 PROCEDURE — 77080 DXA BONE DENSITY AXIAL: CPT | Mod: 26

## 2023-07-05 PROCEDURE — 77065 DX MAMMO INCL CAD UNI: CPT | Mod: 26,LT

## 2023-07-05 PROCEDURE — G0279: CPT | Mod: 26

## 2023-07-05 PROCEDURE — 76641 ULTRASOUND BREAST COMPLETE: CPT | Mod: 26,50

## 2023-07-05 PROCEDURE — 77065 DX MAMMO INCL CAD UNI: CPT

## 2023-07-05 PROCEDURE — 77080 DXA BONE DENSITY AXIAL: CPT

## 2023-07-05 PROCEDURE — 76641 ULTRASOUND BREAST COMPLETE: CPT

## 2023-07-07 LAB
SURGICAL PATHOLOGY STUDY: SIGNIFICANT CHANGE UP

## 2023-07-19 ENCOUNTER — APPOINTMENT (OUTPATIENT)
Dept: GYNECOLOGIC ONCOLOGY | Facility: CLINIC | Age: 33
End: 2023-07-19
Payer: COMMERCIAL

## 2023-07-19 VITALS — SYSTOLIC BLOOD PRESSURE: 106 MMHG | DIASTOLIC BLOOD PRESSURE: 76 MMHG | HEART RATE: 90 BPM

## 2023-07-19 PROCEDURE — 99212 OFFICE O/P EST SF 10 MIN: CPT

## 2023-07-20 ENCOUNTER — APPOINTMENT (OUTPATIENT)
Dept: PLASTIC SURGERY | Facility: CLINIC | Age: 33
End: 2023-07-20
Payer: COMMERCIAL

## 2023-07-20 ENCOUNTER — LABORATORY RESULT (OUTPATIENT)
Age: 33
End: 2023-07-20

## 2023-07-20 VITALS
OXYGEN SATURATION: 99 % | DIASTOLIC BLOOD PRESSURE: 89 MMHG | WEIGHT: 175 LBS | HEART RATE: 107 BPM | TEMPERATURE: 100.3 F | BODY MASS INDEX: 29.16 KG/M2 | SYSTOLIC BLOOD PRESSURE: 135 MMHG | HEIGHT: 65 IN

## 2023-07-20 DIAGNOSIS — Z90.11 ACQUIRED ABSENCE OF RIGHT BREAST AND NIPPLE: ICD-10-CM

## 2023-07-20 PROCEDURE — 99213 OFFICE O/P EST LOW 20 MIN: CPT

## 2023-07-20 RX ORDER — PRENATAL VIT 49/IRON FUM/FOLIC 6.75-0.2MG
TABLET ORAL
Refills: 0 | Status: DISCONTINUED | COMMUNITY
End: 2023-07-20

## 2023-07-21 ENCOUNTER — APPOINTMENT (OUTPATIENT)
Dept: INFUSION THERAPY | Facility: HOSPITAL | Age: 33
End: 2023-07-21

## 2023-07-21 ENCOUNTER — APPOINTMENT (OUTPATIENT)
Dept: HEMATOLOGY ONCOLOGY | Facility: CLINIC | Age: 33
End: 2023-07-21

## 2023-07-21 ENCOUNTER — NON-APPOINTMENT (OUTPATIENT)
Age: 33
End: 2023-07-21

## 2023-07-25 ENCOUNTER — NON-APPOINTMENT (OUTPATIENT)
Age: 33
End: 2023-07-25

## 2023-07-26 ENCOUNTER — APPOINTMENT (OUTPATIENT)
Dept: INFUSION THERAPY | Facility: HOSPITAL | Age: 33
End: 2023-07-26

## 2023-07-26 ENCOUNTER — APPOINTMENT (OUTPATIENT)
Dept: HEMATOLOGY ONCOLOGY | Facility: CLINIC | Age: 33
End: 2023-07-26

## 2023-07-26 ENCOUNTER — APPOINTMENT (OUTPATIENT)
Dept: HEMATOLOGY ONCOLOGY | Facility: CLINIC | Age: 33
End: 2023-07-26
Payer: COMMERCIAL

## 2023-07-26 PROCEDURE — 99214 OFFICE O/P EST MOD 30 MIN: CPT

## 2023-07-26 RX ORDER — METOCLOPRAMIDE 10 MG/1
10 TABLET ORAL EVERY 6 HOURS
Qty: 60 | Refills: 3 | Status: DISCONTINUED | COMMUNITY
Start: 2022-08-01 | End: 2023-07-26

## 2023-07-26 RX ORDER — DIPHENOXYLATE HYDROCHLORIDE AND ATROPINE SULFATE 2.5; .025 MG/1; MG/1
2.5-0.025 TABLET ORAL
Qty: 30 | Refills: 0 | Status: DISCONTINUED | COMMUNITY
Start: 2023-06-23 | End: 2023-07-26

## 2023-07-26 RX ORDER — POTASSIUM CHLORIDE 1.5 G/1.58G
20 POWDER, FOR SOLUTION ORAL TWICE DAILY
Qty: 10 | Refills: 0 | Status: DISCONTINUED | COMMUNITY
Start: 2023-06-23 | End: 2023-07-26

## 2023-07-26 RX ORDER — DEXAMETHASONE 4 MG/1
4 TABLET ORAL
Qty: 30 | Refills: 1 | Status: DISCONTINUED | COMMUNITY
Start: 2022-08-01 | End: 2023-07-26

## 2023-07-26 RX ORDER — SILVER SULFADIAZINE 10 MG/G
1 CREAM TOPICAL TWICE DAILY
Qty: 1 | Refills: 1 | Status: DISCONTINUED | COMMUNITY
Start: 2023-04-05 | End: 2023-07-26

## 2023-07-26 RX ORDER — LEVOCETIRIZINE DIHYDROCHLORIDE 5 MG/1
5 TABLET, FILM COATED ORAL
Refills: 0 | Status: DISCONTINUED | COMMUNITY
End: 2023-07-26

## 2023-07-26 RX ORDER — DEXAMETHASONE 4 MG/1
4 TABLET ORAL
Qty: 10 | Refills: 0 | Status: DISCONTINUED | COMMUNITY
Start: 2022-09-02 | End: 2023-07-26

## 2023-07-26 RX ORDER — DEXAMETHASONE 4 MG/1
4 TABLET ORAL
Qty: 10 | Refills: 0 | Status: DISCONTINUED | COMMUNITY
Start: 2022-09-16 | End: 2023-07-26

## 2023-07-26 RX ORDER — OMEPRAZOLE 40 MG/1
40 CAPSULE, DELAYED RELEASE ORAL
Qty: 30 | Refills: 2 | Status: DISCONTINUED | COMMUNITY
Start: 2022-08-01 | End: 2023-07-26

## 2023-07-26 RX ORDER — BACILLUS COAGULANS/INULIN 1B-250 MG
CAPSULE ORAL
Refills: 0 | Status: DISCONTINUED | COMMUNITY
End: 2023-07-26

## 2023-07-26 RX ORDER — VALACYCLOVIR 500 MG/1
500 TABLET, FILM COATED ORAL
Qty: 30 | Refills: 3 | Status: DISCONTINUED | COMMUNITY
Start: 2023-02-17 | End: 2023-07-26

## 2023-08-09 ENCOUNTER — OUTPATIENT (OUTPATIENT)
Dept: OUTPATIENT SERVICES | Facility: HOSPITAL | Age: 33
LOS: 1 days | Discharge: HOME | End: 2023-08-09

## 2023-08-09 DIAGNOSIS — Z90.11 ACQUIRED ABSENCE OF RIGHT BREAST AND NIPPLE: Chronic | ICD-10-CM

## 2023-08-09 DIAGNOSIS — Z90.89 ACQUIRED ABSENCE OF OTHER ORGANS: Chronic | ICD-10-CM

## 2023-08-09 DIAGNOSIS — C50.919 MALIGNANT NEOPLASM OF UNSPECIFIED SITE OF UNSPECIFIED FEMALE BREAST: ICD-10-CM

## 2023-08-16 ENCOUNTER — APPOINTMENT (OUTPATIENT)
Dept: CARDIOLOGY | Facility: CLINIC | Age: 33
End: 2023-08-16
Payer: COMMERCIAL

## 2023-08-16 PROCEDURE — 93306 TTE W/DOPPLER COMPLETE: CPT

## 2023-08-16 PROCEDURE — 93356 MYOCRD STRAIN IMG SPCKL TRCK: CPT

## 2023-08-17 ENCOUNTER — APPOINTMENT (OUTPATIENT)
Dept: HEMATOLOGY ONCOLOGY | Facility: CLINIC | Age: 33
End: 2023-08-17

## 2023-08-17 ENCOUNTER — APPOINTMENT (OUTPATIENT)
Dept: INFUSION THERAPY | Facility: HOSPITAL | Age: 33
End: 2023-08-17

## 2023-08-18 DIAGNOSIS — Z51.11 ENCOUNTER FOR ANTINEOPLASTIC CHEMOTHERAPY: ICD-10-CM

## 2023-08-21 ENCOUNTER — APPOINTMENT (OUTPATIENT)
Dept: SURGICAL ONCOLOGY | Facility: CLINIC | Age: 33
End: 2023-08-21
Payer: COMMERCIAL

## 2023-08-21 VITALS
HEART RATE: 80 BPM | WEIGHT: 175 LBS | RESPIRATION RATE: 16 BRPM | OXYGEN SATURATION: 98 % | HEIGHT: 65 IN | DIASTOLIC BLOOD PRESSURE: 80 MMHG | BODY MASS INDEX: 29.16 KG/M2 | SYSTOLIC BLOOD PRESSURE: 120 MMHG

## 2023-08-21 PROCEDURE — 99205 OFFICE O/P NEW HI 60 MIN: CPT

## 2023-08-30 ENCOUNTER — APPOINTMENT (OUTPATIENT)
Dept: MRI IMAGING | Facility: CLINIC | Age: 33
End: 2023-08-30

## 2023-09-03 ENCOUNTER — NON-APPOINTMENT (OUTPATIENT)
Age: 33
End: 2023-09-03

## 2023-09-08 ENCOUNTER — RESULT REVIEW (OUTPATIENT)
Age: 33
End: 2023-09-08

## 2023-09-08 ENCOUNTER — APPOINTMENT (OUTPATIENT)
Dept: INFUSION THERAPY | Facility: HOSPITAL | Age: 33
End: 2023-09-08

## 2023-09-08 ENCOUNTER — APPOINTMENT (OUTPATIENT)
Dept: HEMATOLOGY ONCOLOGY | Facility: CLINIC | Age: 33
End: 2023-09-08

## 2023-09-08 ENCOUNTER — APPOINTMENT (OUTPATIENT)
Dept: HEMATOLOGY ONCOLOGY | Facility: CLINIC | Age: 33
End: 2023-09-08
Payer: COMMERCIAL

## 2023-09-08 DIAGNOSIS — R53.83 OTHER FATIGUE: ICD-10-CM

## 2023-09-08 DIAGNOSIS — Z91.89 OTHER SPECIFIED PERSONAL RISK FACTORS, NOT ELSEWHERE CLASSIFIED: ICD-10-CM

## 2023-09-08 LAB
ALBUMIN SERPL ELPH-MCNC: 4.9 G/DL — SIGNIFICANT CHANGE UP (ref 3.3–5)
ALP SERPL-CCNC: 113 U/L — SIGNIFICANT CHANGE UP (ref 40–120)
ALT FLD-CCNC: 7 U/L — LOW (ref 10–45)
ANION GAP SERPL CALC-SCNC: 13 MMOL/L — SIGNIFICANT CHANGE UP (ref 5–17)
AST SERPL-CCNC: 25 U/L — SIGNIFICANT CHANGE UP (ref 10–40)
BASOPHILS # BLD AUTO: 0.04 K/UL — SIGNIFICANT CHANGE UP (ref 0–0.2)
BASOPHILS NFR BLD AUTO: 0.5 % — SIGNIFICANT CHANGE UP (ref 0–2)
BILIRUB SERPL-MCNC: 0.4 MG/DL — SIGNIFICANT CHANGE UP (ref 0.2–1.2)
BUN SERPL-MCNC: 11 MG/DL — SIGNIFICANT CHANGE UP (ref 7–23)
CALCIUM SERPL-MCNC: 9.9 MG/DL — SIGNIFICANT CHANGE UP (ref 8.4–10.5)
CEA SERPL-MCNC: 0.7 NG/ML — SIGNIFICANT CHANGE UP (ref 0–3.8)
CHLORIDE SERPL-SCNC: 101 MMOL/L — SIGNIFICANT CHANGE UP (ref 96–108)
CO2 SERPL-SCNC: 27 MMOL/L — SIGNIFICANT CHANGE UP (ref 22–31)
CREAT SERPL-MCNC: 0.62 MG/DL — SIGNIFICANT CHANGE UP (ref 0.5–1.3)
EGFR: 120 ML/MIN/1.73M2 — SIGNIFICANT CHANGE UP
EOSINOPHIL # BLD AUTO: 0.13 K/UL — SIGNIFICANT CHANGE UP (ref 0–0.5)
EOSINOPHIL NFR BLD AUTO: 1.6 % — SIGNIFICANT CHANGE UP (ref 0–6)
GLUCOSE SERPL-MCNC: 139 MG/DL — HIGH (ref 70–99)
HCT VFR BLD CALC: 37.3 % — SIGNIFICANT CHANGE UP (ref 34.5–45)
HGB BLD-MCNC: 12.1 G/DL — SIGNIFICANT CHANGE UP (ref 11.5–15.5)
IMM GRANULOCYTES NFR BLD AUTO: 0.4 % — SIGNIFICANT CHANGE UP (ref 0–0.9)
LYMPHOCYTES # BLD AUTO: 2.42 K/UL — SIGNIFICANT CHANGE UP (ref 1–3.3)
LYMPHOCYTES # BLD AUTO: 30.5 % — SIGNIFICANT CHANGE UP (ref 13–44)
MAGNESIUM SERPL-MCNC: 2.1 MG/DL — SIGNIFICANT CHANGE UP (ref 1.6–2.6)
MCHC RBC-ENTMCNC: 27.1 PG — SIGNIFICANT CHANGE UP (ref 27–34)
MCHC RBC-ENTMCNC: 32.4 G/DL — SIGNIFICANT CHANGE UP (ref 32–36)
MCV RBC AUTO: 83.4 FL — SIGNIFICANT CHANGE UP (ref 80–100)
MONOCYTES # BLD AUTO: 0.41 K/UL — SIGNIFICANT CHANGE UP (ref 0–0.9)
MONOCYTES NFR BLD AUTO: 5.2 % — SIGNIFICANT CHANGE UP (ref 2–14)
NEUTROPHILS # BLD AUTO: 4.91 K/UL — SIGNIFICANT CHANGE UP (ref 1.8–7.4)
NEUTROPHILS NFR BLD AUTO: 61.8 % — SIGNIFICANT CHANGE UP (ref 43–77)
NRBC # BLD: 0 /100 WBCS — SIGNIFICANT CHANGE UP (ref 0–0)
PLATELET # BLD AUTO: 314 K/UL — SIGNIFICANT CHANGE UP (ref 150–400)
POTASSIUM SERPL-MCNC: 4.1 MMOL/L — SIGNIFICANT CHANGE UP (ref 3.5–5.3)
POTASSIUM SERPL-SCNC: 4.1 MMOL/L — SIGNIFICANT CHANGE UP (ref 3.5–5.3)
PROT SERPL-MCNC: 7.9 G/DL — SIGNIFICANT CHANGE UP (ref 6–8.3)
RBC # BLD: 4.47 M/UL — SIGNIFICANT CHANGE UP (ref 3.8–5.2)
RBC # FLD: 13.5 % — SIGNIFICANT CHANGE UP (ref 10.3–14.5)
SODIUM SERPL-SCNC: 140 MMOL/L — SIGNIFICANT CHANGE UP (ref 135–145)
WBC # BLD: 7.94 K/UL — SIGNIFICANT CHANGE UP (ref 3.8–10.5)
WBC # FLD AUTO: 7.94 K/UL — SIGNIFICANT CHANGE UP (ref 3.8–10.5)

## 2023-09-08 PROCEDURE — 99215 OFFICE O/P EST HI 40 MIN: CPT

## 2023-09-09 LAB
CANCER AG27-29 SERPL-ACNC: 11.2 U/ML — SIGNIFICANT CHANGE UP (ref 0–38.6)

## 2023-09-11 ENCOUNTER — RESULT REVIEW (OUTPATIENT)
Age: 33
End: 2023-09-11

## 2023-09-15 ENCOUNTER — OUTPATIENT (OUTPATIENT)
Dept: OUTPATIENT SERVICES | Facility: HOSPITAL | Age: 33
LOS: 1 days | End: 2023-09-15
Payer: COMMERCIAL

## 2023-09-15 ENCOUNTER — APPOINTMENT (OUTPATIENT)
Dept: CT IMAGING | Facility: CLINIC | Age: 33
End: 2023-09-15
Payer: COMMERCIAL

## 2023-09-15 DIAGNOSIS — Z90.89 ACQUIRED ABSENCE OF OTHER ORGANS: Chronic | ICD-10-CM

## 2023-09-15 DIAGNOSIS — Z00.8 ENCOUNTER FOR OTHER GENERAL EXAMINATION: ICD-10-CM

## 2023-09-15 DIAGNOSIS — Z90.11 ACQUIRED ABSENCE OF RIGHT BREAST AND NIPPLE: Chronic | ICD-10-CM

## 2023-09-15 PROCEDURE — 71260 CT THORAX DX C+: CPT

## 2023-09-15 PROCEDURE — 71260 CT THORAX DX C+: CPT | Mod: 26

## 2023-09-15 PROCEDURE — 74177 CT ABD & PELVIS W/CONTRAST: CPT

## 2023-09-15 PROCEDURE — 74177 CT ABD & PELVIS W/CONTRAST: CPT | Mod: 26

## 2023-09-19 ENCOUNTER — NON-APPOINTMENT (OUTPATIENT)
Age: 33
End: 2023-09-19

## 2023-09-21 ENCOUNTER — APPOINTMENT (OUTPATIENT)
Dept: RADIATION ONCOLOGY | Facility: CLINIC | Age: 33
End: 2023-09-21
Payer: COMMERCIAL

## 2023-09-21 VITALS
HEART RATE: 83 BPM | RESPIRATION RATE: 16 BRPM | DIASTOLIC BLOOD PRESSURE: 94 MMHG | OXYGEN SATURATION: 100 % | BODY MASS INDEX: 29.32 KG/M2 | WEIGHT: 176 LBS | TEMPERATURE: 95.9 F | HEIGHT: 65 IN | SYSTOLIC BLOOD PRESSURE: 138 MMHG

## 2023-09-21 PROCEDURE — 99212 OFFICE O/P EST SF 10 MIN: CPT

## 2023-09-25 ENCOUNTER — APPOINTMENT (OUTPATIENT)
Dept: PLASTIC SURGERY | Facility: CLINIC | Age: 33
End: 2023-09-25
Payer: COMMERCIAL

## 2023-09-25 VITALS
SYSTOLIC BLOOD PRESSURE: 142 MMHG | TEMPERATURE: 98.7 F | WEIGHT: 175 LBS | HEART RATE: 86 BPM | BODY MASS INDEX: 29.16 KG/M2 | HEIGHT: 65 IN | DIASTOLIC BLOOD PRESSURE: 95 MMHG | OXYGEN SATURATION: 99 %

## 2023-09-25 PROCEDURE — XXXXX: CPT

## 2023-09-29 ENCOUNTER — RESULT REVIEW (OUTPATIENT)
Age: 33
End: 2023-09-29

## 2023-09-29 ENCOUNTER — APPOINTMENT (OUTPATIENT)
Dept: HEMATOLOGY ONCOLOGY | Facility: CLINIC | Age: 33
End: 2023-09-29

## 2023-09-29 ENCOUNTER — APPOINTMENT (OUTPATIENT)
Dept: CT IMAGING | Facility: CLINIC | Age: 33
End: 2023-09-29
Payer: COMMERCIAL

## 2023-09-29 ENCOUNTER — APPOINTMENT (OUTPATIENT)
Dept: INFUSION THERAPY | Facility: HOSPITAL | Age: 33
End: 2023-09-29

## 2023-09-29 ENCOUNTER — OUTPATIENT (OUTPATIENT)
Dept: OUTPATIENT SERVICES | Facility: HOSPITAL | Age: 33
LOS: 1 days | End: 2023-09-29
Payer: COMMERCIAL

## 2023-09-29 DIAGNOSIS — Z90.11 ACQUIRED ABSENCE OF RIGHT BREAST AND NIPPLE: ICD-10-CM

## 2023-09-29 DIAGNOSIS — C50.911 MALIGNANT NEOPLASM OF UNSPECIFIED SITE OF RIGHT FEMALE BREAST: ICD-10-CM

## 2023-09-29 DIAGNOSIS — Z90.89 ACQUIRED ABSENCE OF OTHER ORGANS: Chronic | ICD-10-CM

## 2023-09-29 DIAGNOSIS — Z90.11 ACQUIRED ABSENCE OF RIGHT BREAST AND NIPPLE: Chronic | ICD-10-CM

## 2023-09-29 DIAGNOSIS — Z00.8 ENCOUNTER FOR OTHER GENERAL EXAMINATION: ICD-10-CM

## 2023-09-29 LAB
ALBUMIN SERPL ELPH-MCNC: 4.8 G/DL — SIGNIFICANT CHANGE UP (ref 3.3–5)
ALP SERPL-CCNC: 115 U/L — SIGNIFICANT CHANGE UP (ref 40–120)
ALT FLD-CCNC: 12 U/L — SIGNIFICANT CHANGE UP (ref 10–45)
ANION GAP SERPL CALC-SCNC: 10 MMOL/L — SIGNIFICANT CHANGE UP (ref 5–17)
AST SERPL-CCNC: 22 U/L — SIGNIFICANT CHANGE UP (ref 10–40)
BASOPHILS # BLD AUTO: 0.04 K/UL — SIGNIFICANT CHANGE UP (ref 0–0.2)
BASOPHILS NFR BLD AUTO: 0.4 % — SIGNIFICANT CHANGE UP (ref 0–2)
BILIRUB SERPL-MCNC: 0.3 MG/DL — SIGNIFICANT CHANGE UP (ref 0.2–1.2)
BUN SERPL-MCNC: 14 MG/DL — SIGNIFICANT CHANGE UP (ref 7–23)
CALCIUM SERPL-MCNC: 10 MG/DL — SIGNIFICANT CHANGE UP (ref 8.4–10.5)
CEA SERPL-MCNC: <0.6 NG/ML — SIGNIFICANT CHANGE UP (ref 0–3.8)
CHLORIDE SERPL-SCNC: 103 MMOL/L — SIGNIFICANT CHANGE UP (ref 96–108)
CO2 SERPL-SCNC: 27 MMOL/L — SIGNIFICANT CHANGE UP (ref 22–31)
CREAT SERPL-MCNC: 0.63 MG/DL — SIGNIFICANT CHANGE UP (ref 0.5–1.3)
EGFR: 119 ML/MIN/1.73M2 — SIGNIFICANT CHANGE UP
EOSINOPHIL # BLD AUTO: 0.15 K/UL — SIGNIFICANT CHANGE UP (ref 0–0.5)
EOSINOPHIL NFR BLD AUTO: 1.7 % — SIGNIFICANT CHANGE UP (ref 0–6)
GLUCOSE SERPL-MCNC: 101 MG/DL — HIGH (ref 70–99)
HCT VFR BLD CALC: 35.2 % — SIGNIFICANT CHANGE UP (ref 34.5–45)
HGB BLD-MCNC: 11.4 G/DL — LOW (ref 11.5–15.5)
IMM GRANULOCYTES NFR BLD AUTO: 0.4 % — SIGNIFICANT CHANGE UP (ref 0–0.9)
LYMPHOCYTES # BLD AUTO: 2.52 K/UL — SIGNIFICANT CHANGE UP (ref 1–3.3)
LYMPHOCYTES # BLD AUTO: 28.1 % — SIGNIFICANT CHANGE UP (ref 13–44)
MAGNESIUM SERPL-MCNC: 2.1 MG/DL — SIGNIFICANT CHANGE UP (ref 1.6–2.6)
MCHC RBC-ENTMCNC: 27.5 PG — SIGNIFICANT CHANGE UP (ref 27–34)
MCHC RBC-ENTMCNC: 32.4 G/DL — SIGNIFICANT CHANGE UP (ref 32–36)
MCV RBC AUTO: 84.8 FL — SIGNIFICANT CHANGE UP (ref 80–100)
MONOCYTES # BLD AUTO: 0.58 K/UL — SIGNIFICANT CHANGE UP (ref 0–0.9)
MONOCYTES NFR BLD AUTO: 6.5 % — SIGNIFICANT CHANGE UP (ref 2–14)
NEUTROPHILS # BLD AUTO: 5.65 K/UL — SIGNIFICANT CHANGE UP (ref 1.8–7.4)
NEUTROPHILS NFR BLD AUTO: 62.9 % — SIGNIFICANT CHANGE UP (ref 43–77)
NRBC # BLD: 0 /100 WBCS — SIGNIFICANT CHANGE UP (ref 0–0)
PLATELET # BLD AUTO: 316 K/UL — SIGNIFICANT CHANGE UP (ref 150–400)
POTASSIUM SERPL-MCNC: 4.2 MMOL/L — SIGNIFICANT CHANGE UP (ref 3.5–5.3)
POTASSIUM SERPL-SCNC: 4.2 MMOL/L — SIGNIFICANT CHANGE UP (ref 3.5–5.3)
PROT SERPL-MCNC: 7.7 G/DL — SIGNIFICANT CHANGE UP (ref 6–8.3)
RBC # BLD: 4.15 M/UL — SIGNIFICANT CHANGE UP (ref 3.8–5.2)
RBC # FLD: 13.7 % — SIGNIFICANT CHANGE UP (ref 10.3–14.5)
SODIUM SERPL-SCNC: 140 MMOL/L — SIGNIFICANT CHANGE UP (ref 135–145)
WBC # BLD: 8.98 K/UL — SIGNIFICANT CHANGE UP (ref 3.8–10.5)
WBC # FLD AUTO: 8.98 K/UL — SIGNIFICANT CHANGE UP (ref 3.8–10.5)

## 2023-09-29 PROCEDURE — 74174 CTA ABD&PLVS W/CONTRAST: CPT | Mod: 26

## 2023-09-29 PROCEDURE — 74174 CTA ABD&PLVS W/CONTRAST: CPT

## 2023-09-30 LAB
CANCER AG27-29 SERPL-ACNC: 11.1 U/ML — SIGNIFICANT CHANGE UP (ref 0–38.6)

## 2023-10-07 ENCOUNTER — APPOINTMENT (OUTPATIENT)
Dept: PLASTIC SURGERY | Facility: CLINIC | Age: 33
End: 2023-10-07

## 2023-10-17 ENCOUNTER — OUTPATIENT (OUTPATIENT)
Dept: OUTPATIENT SERVICES | Facility: HOSPITAL | Age: 33
LOS: 1 days | Discharge: HOME | End: 2023-10-17

## 2023-10-17 DIAGNOSIS — Z90.89 ACQUIRED ABSENCE OF OTHER ORGANS: Chronic | ICD-10-CM

## 2023-10-17 DIAGNOSIS — Z90.11 ACQUIRED ABSENCE OF RIGHT BREAST AND NIPPLE: Chronic | ICD-10-CM

## 2023-10-17 DIAGNOSIS — C50.919 MALIGNANT NEOPLASM OF UNSPECIFIED SITE OF UNSPECIFIED FEMALE BREAST: ICD-10-CM

## 2023-10-20 ENCOUNTER — APPOINTMENT (OUTPATIENT)
Dept: INFUSION THERAPY | Facility: HOSPITAL | Age: 33
End: 2023-10-20

## 2023-10-20 ENCOUNTER — APPOINTMENT (OUTPATIENT)
Dept: HEMATOLOGY ONCOLOGY | Facility: CLINIC | Age: 33
End: 2023-10-20

## 2023-10-21 DIAGNOSIS — Z51.11 ENCOUNTER FOR ANTINEOPLASTIC CHEMOTHERAPY: ICD-10-CM

## 2023-10-26 ENCOUNTER — NON-APPOINTMENT (OUTPATIENT)
Age: 33
End: 2023-10-26

## 2023-10-26 PROBLEM — C50.911 MALIGNANT NEOPLASM OF UNSPECIFIED SITE OF RIGHT FEMALE BREAST: Chronic | Status: ACTIVE | Noted: 2022-06-06

## 2023-10-26 PROBLEM — C50.911 MALIGNANT NEOPLASM OF UNSPECIFIED SITE OF RIGHT FEMALE BREAST: Chronic | Status: ACTIVE | Noted: 2022-08-01

## 2023-11-07 ENCOUNTER — OUTPATIENT (OUTPATIENT)
Dept: OUTPATIENT SERVICES | Facility: HOSPITAL | Age: 33
LOS: 1 days | End: 2023-11-07
Payer: COMMERCIAL

## 2023-11-07 VITALS
TEMPERATURE: 97 F | SYSTOLIC BLOOD PRESSURE: 122 MMHG | OXYGEN SATURATION: 96 % | WEIGHT: 199.3 LBS | DIASTOLIC BLOOD PRESSURE: 86 MMHG | RESPIRATION RATE: 18 BRPM | HEART RATE: 88 BPM | HEIGHT: 66 IN

## 2023-11-07 DIAGNOSIS — Z90.89 ACQUIRED ABSENCE OF OTHER ORGANS: Chronic | ICD-10-CM

## 2023-11-07 DIAGNOSIS — Z90.11 ACQUIRED ABSENCE OF RIGHT BREAST AND NIPPLE: Chronic | ICD-10-CM

## 2023-11-07 DIAGNOSIS — C50.911 MALIGNANT NEOPLASM OF UNSPECIFIED SITE OF RIGHT FEMALE BREAST: ICD-10-CM

## 2023-11-07 DIAGNOSIS — Z01.818 ENCOUNTER FOR OTHER PREPROCEDURAL EXAMINATION: ICD-10-CM

## 2023-11-07 DIAGNOSIS — Z90.722 ACQUIRED ABSENCE OF OVARIES, BILATERAL: Chronic | ICD-10-CM

## 2023-11-07 DIAGNOSIS — Z95.828 PRESENCE OF OTHER VASCULAR IMPLANTS AND GRAFTS: Chronic | ICD-10-CM

## 2023-11-07 LAB
BLD GP AB SCN SERPL QL: SIGNIFICANT CHANGE UP
BLD GP AB SCN SERPL QL: SIGNIFICANT CHANGE UP

## 2023-11-07 PROCEDURE — G0463: CPT

## 2023-11-07 PROCEDURE — 86850 RBC ANTIBODY SCREEN: CPT

## 2023-11-07 PROCEDURE — 36415 COLL VENOUS BLD VENIPUNCTURE: CPT

## 2023-11-07 PROCEDURE — 86900 BLOOD TYPING SEROLOGIC ABO: CPT

## 2023-11-07 PROCEDURE — 86901 BLOOD TYPING SEROLOGIC RH(D): CPT

## 2023-11-07 NOTE — H&P PST ADULT - PROBLEM SELECTOR PLAN 1
Scheduled for left prophylactic mastectomy, mag trace injection left breast, bilateral breast reconstruction with abdominal based (NICOLLE) flaps, removal of left tissue expander with Dr Gross and Dr Siddiqui on 11/21/2023.    Pre op instructions given and patient verbalized understanding.  CBC, CMP, EKG on chart.  Pending cardiac clearance and T&S.  NPO after midnight night before procedure.  To stop all ASA, NSAIDs, vitamins and supplements 1 week prior to procedure.  Chlorhexidene wash given with instructions.

## 2023-11-07 NOTE — H&P PST ADULT - NSICDXPASTSURGICALHX_GEN_ALL_CORE_FT
PAST SURGICAL HISTORY:  H/O bilateral salpingo-oophorectomy     History of right total mastectomy     History of tonsillectomy      PAST SURGICAL HISTORY:  H/O bilateral salpingo-oophorectomy     History of right total mastectomy     History of tonsillectomy     Port-A-Cath in place

## 2023-11-07 NOTE — H&P PST ADULT - NSICDXFAMILYHX_GEN_ALL_CORE_FT
FAMILY HISTORY:  Father  Still living? Yes, Estimated age: Age Unknown  FH: HTN (hypertension), Age at diagnosis: Age Unknown    Mother  Still living? Unknown  FH: hypothyroidism, Age at diagnosis: Age Unknown    Grandparent  Still living? No  Family history of diabetes mellitus (DM), Age at diagnosis: Age Unknown  FH: colon cancer, Age at diagnosis: Age Unknown

## 2023-11-07 NOTE — H&P PST ADULT - MAMMOGRAM, LAST, PROFILE
11/2021 right breast ca, s/p right mastectomy;  within last 6 months left breast with no acute findings

## 2023-11-07 NOTE — H&P PST ADULT - REASON FOR ADMISSION
left prophylactic mastectomy, removal left tissue expander, bilateral breast reconstruction NICOLLE flaps

## 2023-11-07 NOTE — H&P PST ADULT - HISTORY OF PRESENT ILLNESS
32 y/o female with PMH of right breast cancer, s/p mastectomy 34 y/o female with PMH of right breast cancer, s/p mastectomy, completed chemo 1/2023, RT completed 3/2023, continued on immune therapy presents for PST.  Patient states she feeling well overall today at PST and presents for pre op evaluation.  Reports completing course of Doxy 11/2/2023 for acute skin infection of cuticles on bilateral hands, completes resolved and denies and recent cough, fever or SOB.  Scheduled for left prophylactic mastectomy, mag trace injection left breast, bilateral breast reconstruction with abdominal based (NICOLLE) flaps, removal of left tissue expander with Dr Gross and Dr Siddiqui on 11/21/2023.

## 2023-11-07 NOTE — H&P PST ADULT - ASSESSMENT
After Visit Summary      Facility     Name Address Phone       [address] [place] [address] [phone]            Patient Discharge Summary   2/14/2017    Julio Ramirez            Please bring this medication reconciliation form to your next doctor’s appointment(s). Please update the form if you stop taking any of these medications or you start taking any new medications including over the counter medications. Also please carry a copy of this form with you at all times in the event of an emergency.    A copy of these discharge instructions was reviewed with and given to the patient/patient representative/Guardian/Caregiver at discharge.          The doctor who took care of you in the hospital     Provider Specialty    Hari Solorio MD Emergency Medicine    Dez Rutherford MD Family Practice    Rudy Gallo MD Internal Medicine      Allergies as of 2/16/2017        Reactions    Pravastatin     Quinine HIVES           Discharge Medications              What to Do with Your Medications      START taking these medications today unless otherwise stated        Details    Morning Noon Evening Bedtime As needed    azithromycin 250 MG tablet   Commonly known as:  ZITHROMAX        Take 1 tablet by mouth daily.   Authorizing Provider:  Rudy Gallo   Last Dose:  250 mg on 2/16/2017  8:51 AM        Take next dose in am 2/17                       predniSONE 20 MG tablet   Commonly known as:  DELTASONE        Take 2 tablets by mouth daily (with breakfast).   Authorizing Provider:  Rudy Gallo   Last Dose:  20 mg on 2/16/2017  8:52 AM        Take next dose in am 2/17                                                CONTINUE taking these medications which have NOT CHANGED        Details    Morning Noon Evening Bedtime As needed    acetaminophen 500 MG tablet   Commonly known as:  TYLENOL        Take 500 mg by mouth every 6 hours as needed  for Pain.   Last Dose:  650 mg on 2/15/2017  2:41 PM                               albuterol 108 (90 BASE) MCG/ACT inhaler        Inhale 2 puffs into the lungs every 4 hours as needed for Shortness of Breath or Wheezing.   Authorizing Provider:  Rudy Gallo   Comment:  Dispense with a spacer                               amLODIPine 10 MG tablet   Commonly known as:  NORVASC        Take 0.5 tablets by mouth nightly.   Authorizing Provider:  Brad Long   Last Dose:  5 mg on 2/15/2017  9:04 PM                    Take next dose tonight 2/16           aspirin 81 MG tablet        Take 1 tablet by mouth daily.   Authorizing Provider:  Brad Long        Take next dose in am 2/17                       benazepril 20 MG tablet   Commonly known as:  LOTENSIN        Take 1 tablet by mouth daily. Indications: High Blood Pressure   Authorizing Provider:  Brad Long        Take next dose in am 2/17                       clopidogrel 75 MG tablet   Commonly known as:  PLAVIX        Take 1 tablet by mouth daily. 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   Authorizing Provider:  Gomez Tinsley        Take next dose in am 2/17                       meclizine HCl 25 MG tablet   Commonly known as:  ANTIVERT        Take 1 tablet by mouth daily.   Authorizing Provider:  Dez Rutherford        Take next dose in am 2/17                       nicotine 21 MG/24HR patch   Commonly known as:  NICODERM        Place 1 patch onto the skin daily.   Authorizing Provider:  Gomez Tinsley        Take next dose in am 2/17                       prochlorperazine 5 MG tablet   Commonly known as:  COMPAZINE        Take 1 tablet by mouth every 4 hours as needed for Nausea.   Authorizing Provider:   Gomez Tinsley                               sildenafil 50 MG tablet   Commonly known as:  VIAGRA        Take 50 mg by mouth as needed for Erectile Dysfunction.                                                           Where to Get Your Medications      These medications were sent to [id number] - [place], [place] - [address], [address]  [address], [address], [facility] [address]    Hours:  M-F 9-5:30,SA/BLANCHARD Closed Phone:  [phone]     azithromycin 250 MG tablet         You are receiving a paper prescription for the following medications, you can take these to any pharmacy.     Bring a paper prescription for each of these medications     albuterol 108 (90 BASE) MCG/ACT inhaler    predniSONE 20 MG tablet               Your To Do List     Future Appointments Provider Department Dept Phone    2/21/2017 10:00 AM TRAVIS Mckeon [facility] Pod B [phone]    3/6/2017 11:00 AM Brad Long MD [facility] Pod D [phone]    Future Orders Complete By Expires    SERVICE TO PHYSICAL THERAPY  As directed       Discharge Instructions     None      Discharge References/Attachments     PREDNISONE ORAL TABLET (ENGLISH)    AZITHROMYCIN ORAL TABLET (ENGLISH)      Pending Labs/Results     Any lab or diagnostic test results that are \"pending\" at time of discharge are listed below. If no results display then none were \"pending\" at time of discharge. Depending on when the test was completed results may be available within 3-5 days. Results can be reviewed with your provider at your next visits or through [email or web address]. If needed contact the provider office for additional information.          Order Current Status    Blood Culture Preliminary result    Blood Culture Preliminary result       Your Opinion Matters To Us  If you receive a patient satisfaction survey in the mail, please complete and return it in the  postage-paid envelope.    We truly value and appreciate your feedback.           Julio Rmairez        Your To Do List     Future Appointments Provider Department Dept Phone    2/21/2017 10:00 AM TRAVIS Mckeon [facility] Pod B [phone]    3/6/2017 11:00 AM Brad Long MD [facility] Pod D [phone]    Future Orders Complete By Expires    SERVICE TO PHYSICAL THERAPY  As directed       Contact your doctor for follow-up appointment if not already scheduled.     Follow up with TRAVIS Mckeon. Go on 2/21/2017.    Specialty:  Nurse Practitioner - Family    Comments:  @ 10:00am, Hospital Follow Up TCM Visit    Contact information    [facility] [facility] [address]  [phone]           Julio Ramirez           Summary of your Discharge Medications      Take these Medications        Details    Morning Noon Evening Bedtime As needed    acetaminophen 500 MG tablet   Commonly known as:  TYLENOL    Take 500 mg by mouth every 6 hours as needed for Pain.                               albuterol 108 (90 BASE) MCG/ACT inhaler    Inhale 2 puffs into the lungs every 4 hours as needed for Shortness of Breath or Wheezing.   Comment:  Dispense with a spacer                               amLODIPine 10 MG tablet   Commonly known as:  NORVASC    Take 0.5 tablets by mouth nightly.                    Take next dose tonight 2/16           aspirin 81 MG tablet    Take 1 tablet by mouth daily.        Take next dose in am 2/17                       azithromycin 250 MG tablet   Commonly known as:  ZITHROMAX    Take 1 tablet by mouth daily.        Take next dose in am 2/17                       benazepril 20 MG tablet   Commonly known as:  LOTENSIN    Take 1 tablet by mouth daily. Indications: High Blood Pressure        Take next dose in am 2/17                       clopidogrel 75 MG tablet   Commonly known as:  PLAVIX    Take 1  tablet by mouth daily. 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        Take next dose in am 2/17                       meclizine HCl 25 MG tablet   Commonly known as:  ANTIVERT    Take 1 tablet by mouth daily.        Take next dose in am 2/17                       nicotine 21 MG/24HR patch   Commonly known as:  NICODERM    Place 1 patch onto the skin daily.        Take next dose in am 2/17                       predniSONE 20 MG tablet   Commonly known as:  DELTASONE    Take 2 tablets by mouth daily (with breakfast).        Take next dose in am 2/17                       prochlorperazine 5 MG tablet   Commonly known as:  COMPAZINE    Take 1 tablet by mouth every 4 hours as needed for Nausea.                               sildenafil 50 MG tablet   Commonly known as:  VIAGRA    Take 50 mg by mouth as needed for Erectile Dysfunction.                                                             Outpatient Administered Medication List      Notice     You have not been prescribed any facility-administered medications.       Malignant neoplasm uns site right female breast

## 2023-11-07 NOTE — H&P PST ADULT - NSANTHOSAYNRD_GEN_A_CORE
neck 14.5 inches/No. MARCOS screening performed.  STOP BANG Legend: 0-2 = LOW Risk; 3-4 = INTERMEDIATE Risk; 5-8 = HIGH Risk

## 2023-11-08 ENCOUNTER — NON-APPOINTMENT (OUTPATIENT)
Age: 33
End: 2023-11-08

## 2023-11-08 ENCOUNTER — APPOINTMENT (OUTPATIENT)
Dept: CARDIOLOGY | Facility: CLINIC | Age: 33
End: 2023-11-08
Payer: COMMERCIAL

## 2023-11-08 VITALS
HEIGHT: 65 IN | BODY MASS INDEX: 32.65 KG/M2 | DIASTOLIC BLOOD PRESSURE: 90 MMHG | HEART RATE: 85 BPM | SYSTOLIC BLOOD PRESSURE: 130 MMHG | WEIGHT: 196 LBS | OXYGEN SATURATION: 99 % | TEMPERATURE: 98.4 F

## 2023-11-08 DIAGNOSIS — Z86.79 PERSONAL HISTORY OF OTHER DISEASES OF THE CIRCULATORY SYSTEM: ICD-10-CM

## 2023-11-08 DIAGNOSIS — N64.4 MASTODYNIA: ICD-10-CM

## 2023-11-08 DIAGNOSIS — I34.0 NONRHEUMATIC MITRAL (VALVE) INSUFFICIENCY: ICD-10-CM

## 2023-11-08 DIAGNOSIS — E78.00 PURE HYPERCHOLESTEROLEMIA, UNSPECIFIED: ICD-10-CM

## 2023-11-08 DIAGNOSIS — Z13.228 ENCOUNTER FOR SCREENING FOR OTHER METABOLIC DISORDERS: ICD-10-CM

## 2023-11-08 DIAGNOSIS — Z01.818 ENCOUNTER FOR OTHER PREPROCEDURAL EXAMINATION: ICD-10-CM

## 2023-11-08 DIAGNOSIS — D64.9 ANEMIA, UNSPECIFIED: ICD-10-CM

## 2023-11-08 PROCEDURE — 93000 ELECTROCARDIOGRAM COMPLETE: CPT | Mod: NC

## 2023-11-08 PROCEDURE — 99214 OFFICE O/P EST MOD 30 MIN: CPT | Mod: 25

## 2023-11-10 ENCOUNTER — APPOINTMENT (OUTPATIENT)
Dept: INFUSION THERAPY | Facility: HOSPITAL | Age: 33
End: 2023-11-10

## 2023-11-10 ENCOUNTER — RESULT REVIEW (OUTPATIENT)
Age: 33
End: 2023-11-10

## 2023-11-10 LAB
ALBUMIN SERPL ELPH-MCNC: 4.6 G/DL — SIGNIFICANT CHANGE UP (ref 3.3–5)
ALBUMIN SERPL ELPH-MCNC: 4.6 G/DL — SIGNIFICANT CHANGE UP (ref 3.3–5)
ALP SERPL-CCNC: 111 U/L — SIGNIFICANT CHANGE UP (ref 40–120)
ALP SERPL-CCNC: 111 U/L — SIGNIFICANT CHANGE UP (ref 40–120)
ALT FLD-CCNC: 16 U/L — SIGNIFICANT CHANGE UP (ref 10–45)
ALT FLD-CCNC: 16 U/L — SIGNIFICANT CHANGE UP (ref 10–45)
ANION GAP SERPL CALC-SCNC: 14 MMOL/L — SIGNIFICANT CHANGE UP (ref 5–17)
ANION GAP SERPL CALC-SCNC: 14 MMOL/L — SIGNIFICANT CHANGE UP (ref 5–17)
AST SERPL-CCNC: 29 U/L — SIGNIFICANT CHANGE UP (ref 10–40)
AST SERPL-CCNC: 29 U/L — SIGNIFICANT CHANGE UP (ref 10–40)
BASOPHILS # BLD AUTO: 0.04 K/UL — SIGNIFICANT CHANGE UP (ref 0–0.2)
BASOPHILS # BLD AUTO: 0.04 K/UL — SIGNIFICANT CHANGE UP (ref 0–0.2)
BASOPHILS NFR BLD AUTO: 0.5 % — SIGNIFICANT CHANGE UP (ref 0–2)
BASOPHILS NFR BLD AUTO: 0.5 % — SIGNIFICANT CHANGE UP (ref 0–2)
BILIRUB SERPL-MCNC: 0.3 MG/DL — SIGNIFICANT CHANGE UP (ref 0.2–1.2)
BILIRUB SERPL-MCNC: 0.3 MG/DL — SIGNIFICANT CHANGE UP (ref 0.2–1.2)
BUN SERPL-MCNC: 17 MG/DL — SIGNIFICANT CHANGE UP (ref 7–23)
BUN SERPL-MCNC: 17 MG/DL — SIGNIFICANT CHANGE UP (ref 7–23)
CALCIUM SERPL-MCNC: 9.6 MG/DL — SIGNIFICANT CHANGE UP (ref 8.4–10.5)
CALCIUM SERPL-MCNC: 9.6 MG/DL — SIGNIFICANT CHANGE UP (ref 8.4–10.5)
CHLORIDE SERPL-SCNC: 101 MMOL/L — SIGNIFICANT CHANGE UP (ref 96–108)
CHLORIDE SERPL-SCNC: 101 MMOL/L — SIGNIFICANT CHANGE UP (ref 96–108)
CO2 SERPL-SCNC: 25 MMOL/L — SIGNIFICANT CHANGE UP (ref 22–31)
CO2 SERPL-SCNC: 25 MMOL/L — SIGNIFICANT CHANGE UP (ref 22–31)
CREAT SERPL-MCNC: 0.64 MG/DL — SIGNIFICANT CHANGE UP (ref 0.5–1.3)
CREAT SERPL-MCNC: 0.64 MG/DL — SIGNIFICANT CHANGE UP (ref 0.5–1.3)
EGFR: 118 ML/MIN/1.73M2 — SIGNIFICANT CHANGE UP
EGFR: 118 ML/MIN/1.73M2 — SIGNIFICANT CHANGE UP
EOSINOPHIL # BLD AUTO: 0.15 K/UL — SIGNIFICANT CHANGE UP (ref 0–0.5)
EOSINOPHIL # BLD AUTO: 0.15 K/UL — SIGNIFICANT CHANGE UP (ref 0–0.5)
EOSINOPHIL NFR BLD AUTO: 1.7 % — SIGNIFICANT CHANGE UP (ref 0–6)
EOSINOPHIL NFR BLD AUTO: 1.7 % — SIGNIFICANT CHANGE UP (ref 0–6)
GLUCOSE SERPL-MCNC: 126 MG/DL — HIGH (ref 70–99)
GLUCOSE SERPL-MCNC: 126 MG/DL — HIGH (ref 70–99)
HCT VFR BLD CALC: 34.7 % — SIGNIFICANT CHANGE UP (ref 34.5–45)
HCT VFR BLD CALC: 34.7 % — SIGNIFICANT CHANGE UP (ref 34.5–45)
HGB BLD-MCNC: 11.6 G/DL — SIGNIFICANT CHANGE UP (ref 11.5–15.5)
HGB BLD-MCNC: 11.6 G/DL — SIGNIFICANT CHANGE UP (ref 11.5–15.5)
IMM GRANULOCYTES NFR BLD AUTO: 0.6 % — SIGNIFICANT CHANGE UP (ref 0–0.9)
IMM GRANULOCYTES NFR BLD AUTO: 0.6 % — SIGNIFICANT CHANGE UP (ref 0–0.9)
LYMPHOCYTES # BLD AUTO: 2.38 K/UL — SIGNIFICANT CHANGE UP (ref 1–3.3)
LYMPHOCYTES # BLD AUTO: 2.38 K/UL — SIGNIFICANT CHANGE UP (ref 1–3.3)
LYMPHOCYTES # BLD AUTO: 27.4 % — SIGNIFICANT CHANGE UP (ref 13–44)
LYMPHOCYTES # BLD AUTO: 27.4 % — SIGNIFICANT CHANGE UP (ref 13–44)
MCHC RBC-ENTMCNC: 28 PG — SIGNIFICANT CHANGE UP (ref 27–34)
MCHC RBC-ENTMCNC: 28 PG — SIGNIFICANT CHANGE UP (ref 27–34)
MCHC RBC-ENTMCNC: 33.4 G/DL — SIGNIFICANT CHANGE UP (ref 32–36)
MCHC RBC-ENTMCNC: 33.4 G/DL — SIGNIFICANT CHANGE UP (ref 32–36)
MCV RBC AUTO: 83.8 FL — SIGNIFICANT CHANGE UP (ref 80–100)
MCV RBC AUTO: 83.8 FL — SIGNIFICANT CHANGE UP (ref 80–100)
MONOCYTES # BLD AUTO: 0.48 K/UL — SIGNIFICANT CHANGE UP (ref 0–0.9)
MONOCYTES # BLD AUTO: 0.48 K/UL — SIGNIFICANT CHANGE UP (ref 0–0.9)
MONOCYTES NFR BLD AUTO: 5.5 % — SIGNIFICANT CHANGE UP (ref 2–14)
MONOCYTES NFR BLD AUTO: 5.5 % — SIGNIFICANT CHANGE UP (ref 2–14)
NEUTROPHILS # BLD AUTO: 5.6 K/UL — SIGNIFICANT CHANGE UP (ref 1.8–7.4)
NEUTROPHILS # BLD AUTO: 5.6 K/UL — SIGNIFICANT CHANGE UP (ref 1.8–7.4)
NEUTROPHILS NFR BLD AUTO: 64.3 % — SIGNIFICANT CHANGE UP (ref 43–77)
NEUTROPHILS NFR BLD AUTO: 64.3 % — SIGNIFICANT CHANGE UP (ref 43–77)
NRBC # BLD: 0 /100 WBCS — SIGNIFICANT CHANGE UP (ref 0–0)
NRBC # BLD: 0 /100 WBCS — SIGNIFICANT CHANGE UP (ref 0–0)
PLATELET # BLD AUTO: 291 K/UL — SIGNIFICANT CHANGE UP (ref 150–400)
PLATELET # BLD AUTO: 291 K/UL — SIGNIFICANT CHANGE UP (ref 150–400)
POTASSIUM SERPL-MCNC: 3.8 MMOL/L — SIGNIFICANT CHANGE UP (ref 3.5–5.3)
POTASSIUM SERPL-MCNC: 3.8 MMOL/L — SIGNIFICANT CHANGE UP (ref 3.5–5.3)
POTASSIUM SERPL-SCNC: 3.8 MMOL/L — SIGNIFICANT CHANGE UP (ref 3.5–5.3)
POTASSIUM SERPL-SCNC: 3.8 MMOL/L — SIGNIFICANT CHANGE UP (ref 3.5–5.3)
PROT SERPL-MCNC: 7.5 G/DL — SIGNIFICANT CHANGE UP (ref 6–8.3)
PROT SERPL-MCNC: 7.5 G/DL — SIGNIFICANT CHANGE UP (ref 6–8.3)
RBC # BLD: 4.14 M/UL — SIGNIFICANT CHANGE UP (ref 3.8–5.2)
RBC # BLD: 4.14 M/UL — SIGNIFICANT CHANGE UP (ref 3.8–5.2)
RBC # FLD: 13.3 % — SIGNIFICANT CHANGE UP (ref 10.3–14.5)
RBC # FLD: 13.3 % — SIGNIFICANT CHANGE UP (ref 10.3–14.5)
SODIUM SERPL-SCNC: 139 MMOL/L — SIGNIFICANT CHANGE UP (ref 135–145)
SODIUM SERPL-SCNC: 139 MMOL/L — SIGNIFICANT CHANGE UP (ref 135–145)
WBC # BLD: 8.7 K/UL — SIGNIFICANT CHANGE UP (ref 3.8–10.5)
WBC # BLD: 8.7 K/UL — SIGNIFICANT CHANGE UP (ref 3.8–10.5)
WBC # FLD AUTO: 8.7 K/UL — SIGNIFICANT CHANGE UP (ref 3.8–10.5)
WBC # FLD AUTO: 8.7 K/UL — SIGNIFICANT CHANGE UP (ref 3.8–10.5)

## 2023-11-11 ENCOUNTER — APPOINTMENT (OUTPATIENT)
Dept: PLASTIC SURGERY | Facility: CLINIC | Age: 33
End: 2023-11-11
Payer: COMMERCIAL

## 2023-11-11 VITALS
HEART RATE: 78 BPM | SYSTOLIC BLOOD PRESSURE: 128 MMHG | WEIGHT: 195 LBS | DIASTOLIC BLOOD PRESSURE: 88 MMHG | HEIGHT: 66 IN | TEMPERATURE: 98.6 F | BODY MASS INDEX: 31.34 KG/M2 | OXYGEN SATURATION: 98 %

## 2023-11-11 PROCEDURE — XXXXX: CPT | Mod: 1L

## 2023-11-17 ENCOUNTER — LABORATORY RESULT (OUTPATIENT)
Age: 33
End: 2023-11-17

## 2023-11-17 RX ORDER — POVIDONE-IODINE 0.01 ML/1
10 SWAB TOPICAL
Qty: 1 | Refills: 3 | Status: ACTIVE | COMMUNITY
Start: 2023-11-17 | End: 1900-01-01

## 2023-11-17 RX ORDER — SULFAMETHOXAZOLE AND TRIMETHOPRIM 800; 160 MG/1; MG/1
800-160 TABLET ORAL TWICE DAILY
Qty: 8 | Refills: 0 | Status: ACTIVE | COMMUNITY
Start: 2023-11-17 | End: 1900-01-01

## 2023-11-20 ENCOUNTER — TRANSCRIPTION ENCOUNTER (OUTPATIENT)
Age: 33
End: 2023-11-20

## 2023-11-20 NOTE — PATIENT PROFILE ADULT - FALL HARM RISK - UNIVERSAL INTERVENTIONS
Bed in lowest position, wheels locked, appropriate side rails in place/Call bell, personal items and telephone in reach/Instruct patient to call for assistance before getting out of bed or chair/Non-slip footwear when patient is out of bed/[place] to call system/Physically safe environment - no spills, clutter or unnecessary equipment/Purposeful Proactive Rounding/Room/bathroom lighting operational, light cord in reach

## 2023-11-21 ENCOUNTER — APPOINTMENT (OUTPATIENT)
Dept: PLASTIC SURGERY | Facility: HOSPITAL | Age: 33
End: 2023-11-21
Payer: COMMERCIAL

## 2023-11-21 ENCOUNTER — TRANSCRIPTION ENCOUNTER (OUTPATIENT)
Age: 33
End: 2023-11-21

## 2023-11-21 ENCOUNTER — INPATIENT (INPATIENT)
Facility: HOSPITAL | Age: 33
LOS: 1 days | Discharge: HOME | DRG: 581 | End: 2023-11-23
Attending: PLASTIC SURGERY | Admitting: SPECIALIST
Payer: COMMERCIAL

## 2023-11-21 ENCOUNTER — APPOINTMENT (OUTPATIENT)
Dept: SURGICAL ONCOLOGY | Facility: HOSPITAL | Age: 33
End: 2023-11-21

## 2023-11-21 ENCOUNTER — RESULT REVIEW (OUTPATIENT)
Age: 33
End: 2023-11-21

## 2023-11-21 VITALS
OXYGEN SATURATION: 97 % | RESPIRATION RATE: 14 BRPM | SYSTOLIC BLOOD PRESSURE: 127 MMHG | WEIGHT: 199.3 LBS | HEART RATE: 96 BPM | DIASTOLIC BLOOD PRESSURE: 87 MMHG | HEIGHT: 66 IN | TEMPERATURE: 97 F

## 2023-11-21 DIAGNOSIS — C50.911 MALIGNANT NEOPLASM OF UNSPECIFIED SITE OF RIGHT FEMALE BREAST: ICD-10-CM

## 2023-11-21 DIAGNOSIS — Z90.722 ACQUIRED ABSENCE OF OVARIES, BILATERAL: Chronic | ICD-10-CM

## 2023-11-21 DIAGNOSIS — Z90.89 ACQUIRED ABSENCE OF OTHER ORGANS: Chronic | ICD-10-CM

## 2023-11-21 DIAGNOSIS — Z90.11 ACQUIRED ABSENCE OF RIGHT BREAST AND NIPPLE: Chronic | ICD-10-CM

## 2023-11-21 DIAGNOSIS — Z95.828 PRESENCE OF OTHER VASCULAR IMPLANTS AND GRAFTS: Chronic | ICD-10-CM

## 2023-11-21 PROBLEM — Z92.3 PERSONAL HISTORY OF IRRADIATION: Chronic | Status: ACTIVE | Noted: 2023-11-20

## 2023-11-21 LAB
GRAM STN FLD: SIGNIFICANT CHANGE UP
GRAM STN FLD: SIGNIFICANT CHANGE UP
SPECIMEN SOURCE: SIGNIFICANT CHANGE UP
SPECIMEN SOURCE: SIGNIFICANT CHANGE UP

## 2023-11-21 PROCEDURE — 38792 RA TRACER ID OF SENTINL NODE: CPT | Mod: 59

## 2023-11-21 PROCEDURE — 38530 BIOPSY/REMOVAL LYMPH NODES: CPT | Mod: 62,LT

## 2023-11-21 PROCEDURE — 21600 PARTIAL REMOVAL OF RIB: CPT | Mod: 59,RT,62

## 2023-11-21 PROCEDURE — 19371 PERI-IMPLT CAPSLC BRST COMPL: CPT | Mod: RT

## 2023-11-21 PROCEDURE — 88304 TISSUE EXAM BY PATHOLOGIST: CPT | Mod: 26

## 2023-11-21 PROCEDURE — 88305 TISSUE EXAM BY PATHOLOGIST: CPT | Mod: 26

## 2023-11-21 PROCEDURE — 21600 PARTIAL REMOVAL OF RIB: CPT | Mod: 59,LT,62

## 2023-11-21 PROCEDURE — 19307 MAST MOD RAD: CPT

## 2023-11-21 PROCEDURE — 36590 REMOVAL TUNNELED CV CATH: CPT

## 2023-11-21 PROCEDURE — 88307 TISSUE EXAM BY PATHOLOGIST: CPT | Mod: 26

## 2023-11-21 PROCEDURE — S2068: CPT | Mod: RT,62

## 2023-11-21 PROCEDURE — 88300 SURGICAL PATH GROSS: CPT | Mod: 26,59

## 2023-11-21 PROCEDURE — 11971 RMVL TIS XPNDR WO INSJ IMPLT: CPT | Mod: RT

## 2023-11-21 PROCEDURE — 38530 BIOPSY/REMOVAL LYMPH NODES: CPT | Mod: 62,RT

## 2023-11-21 PROCEDURE — 15777 ACELLULAR DERM MATRIX IMPLT: CPT

## 2023-11-21 PROCEDURE — 38900 IO MAP OF SENT LYMPH NODE: CPT

## 2023-11-21 DEVICE — MESH PHASIX 3X8IN: Type: IMPLANTABLE DEVICE | Site: LEFT | Status: FUNCTIONAL

## 2023-11-21 DEVICE — LIGATING CLIPS WECK HORIZON SMALL (YELLOW) 24: Type: IMPLANTABLE DEVICE | Site: LEFT | Status: FUNCTIONAL

## 2023-11-21 DEVICE — CLIP APPLIER ETHICON LIGACLIP 11.5" MEDIUM: Type: IMPLANTABLE DEVICE | Site: LEFT | Status: FUNCTIONAL

## 2023-11-21 DEVICE — CARTRIDGE MICROCLIP 30: Type: IMPLANTABLE DEVICE | Site: LEFT | Status: FUNCTIONAL

## 2023-11-21 DEVICE — COUPLER VESSEL ANASTOMOTIC 2.5MM: Type: IMPLANTABLE DEVICE | Site: LEFT | Status: FUNCTIONAL

## 2023-11-21 DEVICE — LIGATING CLIPS WECK HORIZON MEDIUM (BLUE) 6: Type: IMPLANTABLE DEVICE | Site: LEFT | Status: FUNCTIONAL

## 2023-11-21 RX ORDER — ONDANSETRON 8 MG/1
4 TABLET, FILM COATED ORAL ONCE
Refills: 0 | Status: DISCONTINUED | OUTPATIENT
Start: 2023-11-21 | End: 2023-11-21

## 2023-11-21 RX ORDER — DIAZEPAM 5 MG
5 TABLET ORAL EVERY 8 HOURS
Refills: 0 | Status: DISCONTINUED | OUTPATIENT
Start: 2023-11-21 | End: 2023-11-23

## 2023-11-21 RX ORDER — CEFAZOLIN SODIUM 1 G
2000 VIAL (EA) INJECTION EVERY 8 HOURS
Refills: 0 | Status: DISCONTINUED | OUTPATIENT
Start: 2023-11-21 | End: 2023-11-23

## 2023-11-21 RX ORDER — OXYCODONE HYDROCHLORIDE 5 MG/1
5 TABLET ORAL EVERY 4 HOURS
Refills: 0 | Status: DISCONTINUED | OUTPATIENT
Start: 2023-11-21 | End: 2023-11-23

## 2023-11-21 RX ORDER — HYDROMORPHONE HYDROCHLORIDE 2 MG/ML
0.5 INJECTION INTRAMUSCULAR; INTRAVENOUS; SUBCUTANEOUS
Refills: 0 | Status: DISCONTINUED | OUTPATIENT
Start: 2023-11-21 | End: 2023-11-21

## 2023-11-21 RX ORDER — OXYCODONE HYDROCHLORIDE 5 MG/1
10 TABLET ORAL EVERY 4 HOURS
Refills: 0 | Status: DISCONTINUED | OUTPATIENT
Start: 2023-11-21 | End: 2023-11-23

## 2023-11-21 RX ORDER — ACETAMINOPHEN 500 MG
1000 TABLET ORAL EVERY 8 HOURS
Refills: 0 | Status: DISCONTINUED | OUTPATIENT
Start: 2023-11-21 | End: 2023-11-22

## 2023-11-21 RX ORDER — LORATADINE 10 MG/1
10 TABLET ORAL DAILY
Refills: 0 | Status: DISCONTINUED | OUTPATIENT
Start: 2023-11-21 | End: 2023-11-23

## 2023-11-21 RX ORDER — VENLAFAXINE HCL 75 MG
75 CAPSULE, EXT RELEASE 24 HR ORAL DAILY
Refills: 0 | Status: DISCONTINUED | OUTPATIENT
Start: 2023-11-21 | End: 2023-11-23

## 2023-11-21 RX ORDER — CALCIUM CARBONATE 500(1250)
1 TABLET ORAL EVERY 4 HOURS
Refills: 0 | Status: DISCONTINUED | OUTPATIENT
Start: 2023-11-21 | End: 2023-11-23

## 2023-11-21 RX ORDER — SODIUM CHLORIDE 9 MG/ML
1000 INJECTION, SOLUTION INTRAVENOUS
Refills: 0 | Status: DISCONTINUED | OUTPATIENT
Start: 2023-11-21 | End: 2023-11-21

## 2023-11-21 RX ORDER — ONDANSETRON 8 MG/1
4 TABLET, FILM COATED ORAL EVERY 6 HOURS
Refills: 0 | Status: DISCONTINUED | OUTPATIENT
Start: 2023-11-21 | End: 2023-11-23

## 2023-11-21 RX ORDER — SODIUM CHLORIDE 9 MG/ML
1000 INJECTION, SOLUTION INTRAVENOUS
Refills: 0 | Status: DISCONTINUED | OUTPATIENT
Start: 2023-11-21 | End: 2023-11-23

## 2023-11-21 RX ORDER — ENOXAPARIN SODIUM 100 MG/ML
40 INJECTION SUBCUTANEOUS EVERY 24 HOURS
Refills: 0 | Status: DISCONTINUED | OUTPATIENT
Start: 2023-11-22 | End: 2023-11-23

## 2023-11-21 RX ORDER — ACETAMINOPHEN 500 MG
975 TABLET ORAL EVERY 8 HOURS
Refills: 0 | Status: COMPLETED | OUTPATIENT
Start: 2023-11-21 | End: 2024-10-19

## 2023-11-21 RX ORDER — KETOROLAC TROMETHAMINE 30 MG/ML
15 SYRINGE (ML) INJECTION EVERY 6 HOURS
Refills: 0 | Status: DISCONTINUED | OUTPATIENT
Start: 2023-11-21 | End: 2023-11-23

## 2023-11-21 RX ORDER — DIPHENHYDRAMINE HCL 50 MG
25 CAPSULE ORAL EVERY 4 HOURS
Refills: 0 | Status: DISCONTINUED | OUTPATIENT
Start: 2023-11-21 | End: 2023-11-23

## 2023-11-21 RX ADMIN — Medication 15 MILLIGRAM(S): at 19:13

## 2023-11-21 RX ADMIN — SODIUM CHLORIDE 50 MILLILITER(S): 9 INJECTION, SOLUTION INTRAVENOUS at 06:30

## 2023-11-21 RX ADMIN — Medication 100 MILLIGRAM(S): at 20:31

## 2023-11-21 RX ADMIN — Medication 15 MILLIGRAM(S): at 19:30

## 2023-11-21 RX ADMIN — Medication 400 MILLIGRAM(S): at 23:28

## 2023-11-21 RX ADMIN — Medication 1000 MILLIGRAM(S): at 23:42

## 2023-11-21 NOTE — BRIEF OPERATIVE NOTE - NSICDXBRIEFPREOP_GEN_ALL_CORE_FT
PRE-OP DIAGNOSIS:  History of right breast cancer 21-Nov-2023 11:09:29  Adria Kimble  
PRE-OP DIAGNOSIS:  History of right breast cancer 21-Nov-2023 11:09:29  Adria Kimble

## 2023-11-21 NOTE — BRIEF OPERATIVE NOTE - NSICDXBRIEFPROCEDURE_GEN_ALL_CORE_FT
PROCEDURES:  Simple mastectomy 21-Nov-2023 11:09:06 Left - prophylactic Adria Kimble  
PROCEDURES:  NICOLLE flap, free 21-Nov-2023 18:31:24  Senthil Hernández

## 2023-11-21 NOTE — BRIEF OPERATIVE NOTE - OPERATION/FINDINGS
Prophylactic L simple mastectomy. Single left non-sentinel node removed Prophylactic L simple mastectomy. Single left non-sentinel node removed. Removal of chemo port

## 2023-11-21 NOTE — CONSULT NOTE ADULT - ASSESSMENT
32 y/o Female with PMH of Right Breast Cancer (s/p R Mastectomy, completed chemo 1/2023, RT completed 3/2023, continued on immune therapy) presents for Left prophylactic mastectomy, mag trace injection left breast, B/L Breast Reconstruction with abdominal based (NICOLLE) flaps and removal of left tissue expander with         -admit to ICU  -Q1H Vioptix tissue oxygenation monitoring. Baseline vioptix_______ if VIOPTIX noted to drop by 20%  or more in a 30 minute time frame will alert primary surgical team  -serial flap assessment for signs of perfusion  -skin assessment for color, firmness, signs of hematoma or other changes  -abdominal incisions site  monitoring  -hourly checks on HÉCTOR drain output  -pain control  -Morning LABS  -have discussed case with eICU team, including attending physician  -any and all changes or concerns regarding NICOLLE procedure, flap, etc will be immedietly addressed with primary surgical team   34 y/o Female with PMH of Right Breast Cancer (s/p R Mastectomy, completed course of chemotherapy and RT, currently on Immunotherapy) presents for Left prophylactic Mastectomy and B/L Breast Reconstruction with abdominal based (NICOLLE) flaps and removal of left tissue expander with:         -Admit to ICU  -Q1H Vioptix tissue oxygenation monitoring. Baseline Vioptix 60/90 B/L. If VIOPTIX noted to drop by 20% or more in a 30 minute time frame will alert primary surgical team.   -Serial breast flap assessment for signs of perfusion. Continue skin assessment for color, firmness, signs of hematoma or other changes.   -Abdominal incisions site monitoring. Hourly checks on HÉCTOR drain output. HÉCTOR drains x6, with minimal serosanguinous drainage.  -Pain control PRN.   -NPO. Advanced diet per Surgical team. GI prophylaxis with Protonix.   -Walker catheter in place. Gentle hydration with LR@ 125/hr. Trend labs, replete lytes as needed to maintain K>4, Mg>3, Phos>2.  -Any and all changes or concerns regarding NICOLLE procedure, flap, etc will be immediately addressed with primary surgical team.      Plan discussed with eICU Attending, Surgical PA and 2Surg RN. Patient understanding and agreeable to plan.    34 y/o Female with PMH of Right Breast Cancer (s/p R Mastectomy, completed course of chemotherapy and RT, currently on Immunotherapy) presents for Left prophylactic Mastectomy and B/L Breast Reconstruction with abdominal based (NICOLLE) flaps and removal of left tissue expander with:     1. Left Mastectomy and B/L Breast Reconstruction with NICOLLE Flaps       -Admit to ICU  -Q1H Vioptix tissue oxygenation monitoring. Baseline Vioptix 60/90 B/L. If VIOPTIX noted to drop by 20% or more in a 30 minute time frame will alert primary surgical team.   -Serial breast flap assessment for signs of perfusion. Continue skin assessment for color, firmness, signs of hematoma or other changes.   -Abdominal incisions site monitoring. Hourly checks on HÉCTOR drain output. HÉCTOR drains x6, with minimal serosanguinous drainage.  -Pain control PRN.   -NPO. Advanced diet per Surgical team. GI prophylaxis with Protonix.   -Walker catheter in place. Gentle hydration with LR@ 125/hr. Trend labs, replete lytes as needed to maintain K>4, Mg>3, Phos>2.  -Any and all changes or concerns regarding NICOLLE procedure, flap, etc will be immediately addressed with primary surgical team.      Plan discussed with eICU Attending, Surgical PA and 2Surg RN. Patient understanding and agreeable to plan.

## 2023-11-21 NOTE — BRIEF OPERATIVE NOTE - NSICDXBRIEFPOSTOP_GEN_ALL_CORE_FT
POST-OP DIAGNOSIS:  History of right breast cancer 21-Nov-2023 11:09:42  Adria Kimble  
POST-OP DIAGNOSIS:  History of right breast cancer 21-Nov-2023 11:09:42  Adria Kimble

## 2023-11-21 NOTE — BRIEF OPERATIVE NOTE - OPERATION/FINDINGS
B/l DIEPs for recon for L mastectomy from today and delayed reconstruction for R side with removal of TE, capsule and skin from radiation damage

## 2023-11-21 NOTE — CONSULT NOTE ADULT - SUBJECTIVE AND OBJECTIVE BOX
Patient is a 33y old  Female who presents with a chief complaint of left prophylactic mastectomy, removal left tissue expander, bilateral breast reconstruction NICOLLE flaps (2023 09:13)      BRIEF HOSPITAL COURSE-  32 y/o Female with PMH of Right Breast Cancer (s/p R Mastectomy, completed chemo 2023, RT completed 3/2023, continued on immune therapy) presents for Left prophylactic mastectomy, mag trace injection left breast, B/L Breast Reconstruction with abdominal based (NICOLLE) flaps and removal of left tissue expander. Reports recent acute skin infection of cuticles for which she was prescribed a course of Doxycycline.       Review of Systems:  CONSTITUTIONAL: No fever or chills.  EYES: No eye pain, visual disturbances, or discharge.  ENMT: No difficulty hearing, tinnitus, vertigo. No sinus or throat pain.  NECK: No pain or stiffness.  RESPIRATORY: No cough, wheezing, chills or hemoptysis. No shortness of breath.  CARDIOVASCULAR: No chest pain, palpitations, dizziness, or leg swelling.  GASTROINTESTINAL: +Nausea. No abdominal or epigastric pain. No vomiting or hematemesis. No diarrhea or constipation. No melena or hematochezia.  GENITOURINARY: No dysuria, frequency, hematuria, or incontinence.  NEUROLOGICAL: No headaches, memory loss, loss of strength, numbness, or tremors.  SKIN: No itching, burning, rashes, or lesions.   MUSCULOSKELETAL: No joint pain or swelling. No muscle, back, or extremity pain.  PSYCHIATRIC: No depression, anxiety, mood swings, or difficulty sleeping.      PAST MEDICAL & SURGICAL HISTORY-  Malignant neoplasm of unspecified site of right female breast      Breast cancer, right       (normal spontaneous vaginal delivery)      History of chemotherapy      S/P radiation therapy      History of tonsillectomy      History of right total mastectomy      H/O bilateral salpingo-oophorectomy      Port-A-Cath in place          Medications:  ceFAZolin   IVPB 2000 milliGRAM(s) IV Intermittent every 8 hours    loratadine 10 milliGRAM(s) Oral daily PRN    acetaminophen     Tablet .. 975 milliGRAM(s) Oral every 8 hours  acetaminophen   IVPB .. 1000 milliGRAM(s) IV Intermittent every 8 hours  diazepam    Tablet 5 milliGRAM(s) Oral every 8 hours PRN  diphenhydrAMINE 25 milliGRAM(s) Oral every 4 hours PRN  ketorolac   Injectable 15 milliGRAM(s) IV Push every 6 hours  ondansetron Injectable 4 milliGRAM(s) IV Push every 6 hours PRN  oxyCODONE    IR 5 milliGRAM(s) Oral every 4 hours PRN  oxyCODONE    IR 10 milliGRAM(s) Oral every 4 hours PRN  venlafaxine XR. 75 milliGRAM(s) Oral daily    aluminum hydroxide/magnesium hydroxide/simethicone Suspension 30 milliLiter(s) Oral every 4 hours PRN  calcium carbonate    500 mG (Tums) Chewable 1 Tablet(s) Chew every 4 hours PRN    lactated ringers. 1000 milliLiter(s) IV Continuous <Continuous>        ICU Vital Signs Last 24 Hrs  T(C): 36.6 (2023 19:51), Max: 37.2 (2023 18:02)  T(F): 97.8 (2023 19:51), Max: 99 (2023 18:02)  HR: 95 (2023 19:51) (95 - 116)  BP: 115/79 (2023 19:51) (107/64 - 127/87)  BP(mean): --  ABP: --  ABP(mean): --  RR: 13 (2023 19:51) (13 - 23)  SpO2: 99% (2023 19:51) (94% - 99%)    O2 Parameters below as of 2023 19:51  Patient On (Oxygen Delivery Method): nasal cannula  O2 Flow (L/min): 4        I&O's Detail    2023 07:01  -  2023 20:05  --------------------------------------------------------  IN:    Lactated Ringers: 190 mL  Total IN: 190 mL    OUT:    Drain (mL): 20 mL    Drain (mL): 10 mL    Drain (mL): 20 mL    Drain (mL): 5 mL    Drain (mL): 5 mL    Drain (mL): 5 mL    Indwelling Catheter - Urethral (mL): 120 mL  Total OUT: 185 mL    Total NET: 5 mL        LABS:        CAPILLARY BLOOD GLUCOSE          CULTURES:      Physical Examination:  General: Middle aged female, well developed. Resting in bed comfortably, in no acute distress.    HEENT: Pupils equal, reactive to light. Symmetric.  PULM: Clear to auscultation bilaterally, no adventitious sounds. No significant sputum production.  NECK: Supple, no lymphadenopathy, trachea midline.  CVS: Regular rate and rhythm. No murmurs, rubs, or gallops. S1, S2 intact.   ABD: Soft, nondistended,  mild tenderness, normoactive bowel sounds. No masses palpable. Abdominal incision   EXT: No edema, nontender  SKIN: Warm and well perfused, no rashes noted.  NEURO: Alert, oriented, interactive, nonfocal  DEVICES:     RADIOLOGY: ***    CRITICAL CARE TIME SPENT: ** minutes assessing presenting problems of acute illness, which pose high probability of life threatening deterioration or end organ damage/dysfunction, as well as medical decision making including initiating plan of care, reviewing data, reviewing radiologic exams, discussing with multidisciplinary team,  discussing goals of care with patient/family, and writing this note.  Non-inclusive of procedures performed,   Patient is a 33y old  Female who presents with a chief complaint of left prophylactic mastectomy, removal left tissue expander, bilateral breast reconstruction NICOLLE flaps (2023 09:13)      BRIEF HOSPITAL COURSE-  34 y/o Female with PMH of Right Breast Cancer (s/p R Mastectomy, completed chemo 2023, RT completed 3/2023, continued on immune therapy) presents for Left prophylactic mastectomy, mag trace injection left breast, B/L Breast Reconstruction with abdominal based (NICOLLE) flaps and removal of left tissue expander. Reports recent acute skin infection of cuticles for which she was prescribed a course of Doxycycline.       Review of Systems:  CONSTITUTIONAL: No fever or chills.  EYES: No eye pain, visual disturbances, or discharge.  ENMT: No difficulty hearing, tinnitus, vertigo. No sinus or throat pain.  NECK: No pain or stiffness.  RESPIRATORY: No cough, wheezing, chills or hemoptysis. No shortness of breath.  CARDIOVASCULAR: No chest pain, palpitations, dizziness, or leg swelling.  GASTROINTESTINAL: +Nausea. No abdominal or epigastric pain. No vomiting or hematemesis. No diarrhea or constipation. No melena or hematochezia.  GENITOURINARY: No dysuria, frequency, hematuria, or incontinence.  NEUROLOGICAL: No headaches, memory loss, loss of strength, numbness, or tremors.  SKIN: No itching, burning, rashes, or lesions.   MUSCULOSKELETAL: No joint pain or swelling. No muscle, back, or extremity pain.  PSYCHIATRIC: No depression, anxiety, mood swings, or difficulty sleeping.      PAST MEDICAL & SURGICAL HISTORY-  Malignant neoplasm of unspecified site of right female breast      Breast cancer, right       (normal spontaneous vaginal delivery)      History of chemotherapy      S/P radiation therapy      History of tonsillectomy      History of right total mastectomy      H/O bilateral salpingo-oophorectomy      Port-A-Cath in place          Medications:  ceFAZolin   IVPB 2000 milliGRAM(s) IV Intermittent every 8 hours    loratadine 10 milliGRAM(s) Oral daily PRN    acetaminophen     Tablet .. 975 milliGRAM(s) Oral every 8 hours  acetaminophen   IVPB .. 1000 milliGRAM(s) IV Intermittent every 8 hours  diazepam    Tablet 5 milliGRAM(s) Oral every 8 hours PRN  diphenhydrAMINE 25 milliGRAM(s) Oral every 4 hours PRN  ketorolac   Injectable 15 milliGRAM(s) IV Push every 6 hours  ondansetron Injectable 4 milliGRAM(s) IV Push every 6 hours PRN  oxyCODONE    IR 5 milliGRAM(s) Oral every 4 hours PRN  oxyCODONE    IR 10 milliGRAM(s) Oral every 4 hours PRN  venlafaxine XR. 75 milliGRAM(s) Oral daily    aluminum hydroxide/magnesium hydroxide/simethicone Suspension 30 milliLiter(s) Oral every 4 hours PRN  calcium carbonate    500 mG (Tums) Chewable 1 Tablet(s) Chew every 4 hours PRN    lactated ringers. 1000 milliLiter(s) IV Continuous <Continuous>        ICU Vital Signs Last 24 Hrs  T(C): 36.6 (2023 19:51), Max: 37.2 (2023 18:02)  T(F): 97.8 (2023 19:51), Max: 99 (2023 18:02)  HR: 95 (2023 19:51) (95 - 116)  BP: 115/79 (2023 19:51) (107/64 - 127/87)  BP(mean): --  ABP: --  ABP(mean): --  RR: 13 (2023 19:51) (13 - 23)  SpO2: 99% (2023 19:51) (94% - 99%)    O2 Parameters below as of 2023 19:51  Patient On (Oxygen Delivery Method): nasal cannula  O2 Flow (L/min): 4        I&O's Detail    2023 07:01  -  2023 20:05  --------------------------------------------------------  IN:    Lactated Ringers: 190 mL  Total IN: 190 mL    OUT:    Drain (mL): 20 mL    Drain (mL): 10 mL    Drain (mL): 20 mL    Drain (mL): 5 mL    Drain (mL): 5 mL    Drain (mL): 5 mL    Indwelling Catheter - Urethral (mL): 120 mL  Total OUT: 185 mL    Total NET: 5 mL        LABS:        CAPILLARY BLOOD GLUCOSE          CULTURES:      Physical Examination:  General: Middle aged female, well developed. Resting in bed comfortably, in no acute distress.    HEENT: Pupils equal, reactive to light. Symmetric.  PULM: Clear to auscultation bilaterally, no adventitious sounds. No significant sputum production.  NECK: Supple, no lymphadenopathy, trachea midline.  CVS: Regular rate and rhythm. No murmurs, rubs, or gallops. S1, S2 intact.   BREASTS: B/L breast flaps intact. Warm. No hematoma.   ABD: Soft, nondistended,  mild tenderness, normoactive bowel sounds. No masses palpable. Abdominal incision C/D/I. HÉCTOR drain x2 with serosanguinous drainage.   EXT: No edema, nontender.  SKIN: Warm and well perfused, no rashes noted.  NEURO: Alert, oriented, interactive, nonfocal.   DEVICES:       RADIOLOGY-      Time spent on this patient encounter, which includes documenting this note in the electronic medical record, was 75+ minutes including assessing the presenting problems with associated risks, reviewing the medical record to prepare for the encounter, and meeting face to face with the patient to obtain additional history.  I have also performed an appropriate physical exam, made interventions listed and ordered and interpreted appropriate diagnostic studies as documented.     To improve communication and patient safety, I have coordinated care with the multidisciplinary team including the bedside nurse, appropriate attending of record and consultants as needed.       Patient is a 33y old  Female who presents with a chief complaint of left prophylactic mastectomy, removal left tissue expander, bilateral breast reconstruction NICOLLE flaps (2023 09:13)      BRIEF HOSPITAL COURSE-  34 y/o Female with PMH of Right Breast Cancer (s/p R Mastectomy, completed chemo 2023, RT completed 3/2023, continued on immune therapy) presents for Left prophylactic mastectomy, mag trace injection left breast, B/L Breast Reconstruction with abdominal based (NICOLLE) flaps and removal of left tissue expander. Reports recent acute skin infection of cuticles for which she was prescribed a course of Doxycycline.       Review of Systems:  CONSTITUTIONAL: No fever or chills.  EYES: No eye pain, visual disturbances, or discharge.  ENMT: No difficulty hearing, tinnitus, vertigo. No sinus or throat pain.  NECK: No pain or stiffness.  RESPIRATORY: No cough, wheezing, chills or hemoptysis. No shortness of breath.  CARDIOVASCULAR: No chest pain, palpitations, dizziness, or leg swelling.  GASTROINTESTINAL: +Nausea. No abdominal or epigastric pain. No vomiting or hematemesis. No diarrhea or constipation. No melena or hematochezia.  GENITOURINARY: No dysuria, frequency, hematuria, or incontinence.  NEUROLOGICAL: No headaches, memory loss, loss of strength, numbness, or tremors.  SKIN: No itching, burning, rashes, or lesions.   MUSCULOSKELETAL: No joint pain or swelling. No muscle, back, or extremity pain.  PSYCHIATRIC: No depression, anxiety, mood swings, or difficulty sleeping.      PAST MEDICAL & SURGICAL HISTORY-  Malignant neoplasm of unspecified site of right female breast      Breast cancer, right       (normal spontaneous vaginal delivery)      History of chemotherapy      S/P radiation therapy      History of tonsillectomy      History of right total mastectomy      H/O bilateral salpingo-oophorectomy      Port-A-Cath in place          Medications:  ceFAZolin   IVPB 2000 milliGRAM(s) IV Intermittent every 8 hours    loratadine 10 milliGRAM(s) Oral daily PRN    acetaminophen     Tablet .. 975 milliGRAM(s) Oral every 8 hours  acetaminophen   IVPB .. 1000 milliGRAM(s) IV Intermittent every 8 hours  diazepam    Tablet 5 milliGRAM(s) Oral every 8 hours PRN  diphenhydrAMINE 25 milliGRAM(s) Oral every 4 hours PRN  ketorolac   Injectable 15 milliGRAM(s) IV Push every 6 hours  ondansetron Injectable 4 milliGRAM(s) IV Push every 6 hours PRN  oxyCODONE    IR 5 milliGRAM(s) Oral every 4 hours PRN  oxyCODONE    IR 10 milliGRAM(s) Oral every 4 hours PRN  venlafaxine XR. 75 milliGRAM(s) Oral daily    aluminum hydroxide/magnesium hydroxide/simethicone Suspension 30 milliLiter(s) Oral every 4 hours PRN  calcium carbonate    500 mG (Tums) Chewable 1 Tablet(s) Chew every 4 hours PRN    lactated ringers. 1000 milliLiter(s) IV Continuous <Continuous>        ICU Vital Signs Last 24 Hrs  T(C): 36.6 (2023 19:51), Max: 37.2 (2023 18:02)  T(F): 97.8 (2023 19:51), Max: 99 (2023 18:02)  HR: 95 (2023 19:51) (95 - 116)  BP: 115/79 (2023 19:51) (107/64 - 127/87)  BP(mean): --  ABP: --  ABP(mean): --  RR: 13 (2023 19:51) (13 - 23)  SpO2: 99% (2023 19:51) (94% - 99%)    O2 Parameters below as of 2023 19:51  Patient On (Oxygen Delivery Method): nasal cannula  O2 Flow (L/min): 4        I&O's Detail    2023 07:01  -  2023 20:05  --------------------------------------------------------  IN:    Lactated Ringers: 190 mL  Total IN: 190 mL    OUT:    Drain (mL): 20 mL    Drain (mL): 10 mL    Drain (mL): 20 mL    Drain (mL): 5 mL    Drain (mL): 5 mL    Drain (mL): 5 mL    Indwelling Catheter - Urethral (mL): 120 mL  Total OUT: 185 mL    Total NET: 5 mL        LABS:        CAPILLARY BLOOD GLUCOSE          CULTURES:      Physical Examination:  General: Middle aged female, well developed. Resting in bed comfortably, in no acute distress.    HEENT: Pupils equal, reactive to light. Symmetric.  PULM: Clear to auscultation bilaterally, no adventitious sounds. No significant sputum production.  NECK: Supple, no lymphadenopathy, trachea midline.  CVS: Regular rate and rhythm. No murmurs, rubs, or gallops. S1, S2 intact.   BREASTS: B/L breast flaps intact, L >R. No hematoma. HÉCTOR drain x4 with minimal serosanguinous drainage.   ABD: Soft, nondistended,  mild tenderness, normoactive bowel sounds. No masses palpable. Abdominal incision C/D/I. HÉCTOR drain x2 with minimal serosanguinous drainage.   EXT: No edema, nontender.  SKIN: Warm and well perfused, no rashes noted.  NEURO: Alert, oriented, interactive, nonfocal.   DEVICES:       RADIOLOGY-      Time spent on this patient encounter, which includes documenting this note in the electronic medical record, was 75+ minutes including assessing the presenting problems with associated risks, reviewing the medical record to prepare for the encounter, and meeting face to face with the patient to obtain additional history.  I have also performed an appropriate physical exam, made interventions listed and ordered and interpreted appropriate diagnostic studies as documented.     To improve communication and patient safety, I have coordinated care with the multidisciplinary team including the bedside nurse, appropriate attending of record and consultants as needed.       Patient is a 33y old  Female who presents with a chief complaint of left prophylactic mastectomy, removal left tissue expander, bilateral breast reconstruction NICOLLE flaps (2023 09:13)      BRIEF HOSPITAL COURSE-  32 y/o Female with PMH of Right Breast Cancer (s/p R Mastectomy, completed chemo 2023, RT completed 3/2023, continued on immune therapy) presents for Left prophylactic mastectomy, mag trace injection left breast, B/L Breast Reconstruction with abdominal based (NICOLLE) flaps and removal of left tissue expander. Reports recent acute skin infection of cuticles for which she was prescribed a course of Doxycycline.       Review of Systems:  CONSTITUTIONAL: No fever or chills.  EYES: No eye pain, visual disturbances, or discharge.  ENMT: No difficulty hearing, tinnitus, vertigo. No sinus or throat pain.  NECK: No pain or stiffness.  RESPIRATORY: No cough, wheezing, chills or hemoptysis. No shortness of breath.  CARDIOVASCULAR: No chest pain, palpitations, dizziness, or leg swelling.  GASTROINTESTINAL: +Nausea. No abdominal or epigastric pain. No vomiting or hematemesis. No diarrhea or constipation. No melena or hematochezia.  GENITOURINARY: No dysuria, frequency, hematuria, or incontinence.  NEUROLOGICAL: No headaches, memory loss, loss of strength, numbness, or tremors.  SKIN: No itching, burning, rashes, or lesions.   MUSCULOSKELETAL: No joint pain or swelling. No muscle, back, or extremity pain.  PSYCHIATRIC: No depression, anxiety, mood swings, or difficulty sleeping.      PAST MEDICAL & SURGICAL HISTORY-  Malignant neoplasm of unspecified site of right female breast      Breast cancer, right       (normal spontaneous vaginal delivery)      History of chemotherapy      S/P radiation therapy      History of tonsillectomy      History of right total mastectomy      H/O bilateral salpingo-oophorectomy      Port-A-Cath in place          Medications:  ceFAZolin   IVPB 2000 milliGRAM(s) IV Intermittent every 8 hours    loratadine 10 milliGRAM(s) Oral daily PRN    acetaminophen     Tablet .. 975 milliGRAM(s) Oral every 8 hours  acetaminophen   IVPB .. 1000 milliGRAM(s) IV Intermittent every 8 hours  diazepam    Tablet 5 milliGRAM(s) Oral every 8 hours PRN  diphenhydrAMINE 25 milliGRAM(s) Oral every 4 hours PRN  ketorolac   Injectable 15 milliGRAM(s) IV Push every 6 hours  ondansetron Injectable 4 milliGRAM(s) IV Push every 6 hours PRN  oxyCODONE    IR 5 milliGRAM(s) Oral every 4 hours PRN  oxyCODONE    IR 10 milliGRAM(s) Oral every 4 hours PRN  venlafaxine XR. 75 milliGRAM(s) Oral daily    aluminum hydroxide/magnesium hydroxide/simethicone Suspension 30 milliLiter(s) Oral every 4 hours PRN  calcium carbonate    500 mG (Tums) Chewable 1 Tablet(s) Chew every 4 hours PRN    lactated ringers. 1000 milliLiter(s) IV Continuous <Continuous>        ICU Vital Signs Last 24 Hrs  T(C): 36.6 (2023 19:51), Max: 37.2 (2023 18:02)  T(F): 97.8 (2023 19:51), Max: 99 (2023 18:02)  HR: 95 (2023 19:51) (95 - 116)  BP: 115/79 (2023 19:51) (107/64 - 127/87)  BP(mean): --  ABP: --  ABP(mean): --  RR: 13 (2023 19:51) (13 - 23)  SpO2: 99% (2023 19:51) (94% - 99%)    O2 Parameters below as of 2023 19:51  Patient On (Oxygen Delivery Method): nasal cannula  O2 Flow (L/min): 4        I&O's Detail    2023 07:01  -  2023 20:05  --------------------------------------------------------  IN:    Lactated Ringers: 190 mL  Total IN: 190 mL    OUT:    Drain (mL): 20 mL    Drain (mL): 10 mL    Drain (mL): 20 mL    Drain (mL): 5 mL    Drain (mL): 5 mL    Drain (mL): 5 mL    Indwelling Catheter - Urethral (mL): 120 mL  Total OUT: 185 mL    Total NET: 5 mL        LABS:        CAPILLARY BLOOD GLUCOSE          CULTURES:      Physical Examination:  General: Middle aged female, well developed. Resting in bed comfortably, in no acute distress.    HEENT: Pupils equal, reactive to light. Symmetric.  PULM: Clear to auscultation bilaterally, no adventitious sounds. No significant sputum production.  NECK: Supple, no lymphadenopathy, trachea midline.  CVS: Regular rate and rhythm. No murmurs, rubs, or gallops. S1, S2 intact.   BREASTS: B/L breast flaps intact, L >R. No hematoma. HÉCTOR drain x4 with minimal serosanguinous drainage.   ABD: Soft, nondistended,  mild tenderness, normoactive bowel sounds. No masses palpable. Abdominal incision C/D/I. HÉCTOR drain x2 with minimal serosanguinous drainage.   EXT: No edema, nontender.  SKIN: Warm and well perfused, no rashes noted.  NEURO: Alert, oriented, interactive, nonfocal.   DEVICES:       RADIOLOGY-      DATE OF ENTRY OF THIS NOTE IS EQUAL TO THE DATE OF SERVICES RENDERED       Time spent on this patient encounter, which includes documenting this note in the electronic medical record, was 75+ minutes including assessing the presenting problems with associated risks, reviewing the medical record to prepare for the encounter, and meeting face to face with the patient to obtain additional history.  I have also performed an appropriate physical exam, made interventions listed and ordered and interpreted appropriate diagnostic studies as documented.     To improve communication and patient safety, I have coordinated care with the multidisciplinary team including the bedside nurse, appropriate attending of record and consultants as needed.

## 2023-11-22 ENCOUNTER — TRANSCRIPTION ENCOUNTER (OUTPATIENT)
Age: 33
End: 2023-11-22

## 2023-11-22 LAB
ANION GAP SERPL CALC-SCNC: 5 MMOL/L — SIGNIFICANT CHANGE UP (ref 5–17)
ANION GAP SERPL CALC-SCNC: 5 MMOL/L — SIGNIFICANT CHANGE UP (ref 5–17)
BUN SERPL-MCNC: 11 MG/DL — SIGNIFICANT CHANGE UP (ref 7–23)
BUN SERPL-MCNC: 11 MG/DL — SIGNIFICANT CHANGE UP (ref 7–23)
CALCIUM SERPL-MCNC: 8.2 MG/DL — LOW (ref 8.4–10.5)
CALCIUM SERPL-MCNC: 8.2 MG/DL — LOW (ref 8.4–10.5)
CHLORIDE SERPL-SCNC: 105 MMOL/L — SIGNIFICANT CHANGE UP (ref 96–108)
CHLORIDE SERPL-SCNC: 105 MMOL/L — SIGNIFICANT CHANGE UP (ref 96–108)
CO2 SERPL-SCNC: 29 MMOL/L — SIGNIFICANT CHANGE UP (ref 22–31)
CO2 SERPL-SCNC: 29 MMOL/L — SIGNIFICANT CHANGE UP (ref 22–31)
CREAT SERPL-MCNC: 0.72 MG/DL — SIGNIFICANT CHANGE UP (ref 0.5–1.3)
CREAT SERPL-MCNC: 0.72 MG/DL — SIGNIFICANT CHANGE UP (ref 0.5–1.3)
EGFR: 112 ML/MIN/1.73M2 — SIGNIFICANT CHANGE UP
EGFR: 112 ML/MIN/1.73M2 — SIGNIFICANT CHANGE UP
GLUCOSE SERPL-MCNC: 116 MG/DL — HIGH (ref 70–99)
GLUCOSE SERPL-MCNC: 116 MG/DL — HIGH (ref 70–99)
HCT VFR BLD CALC: 30.4 % — LOW (ref 34.5–45)
HCT VFR BLD CALC: 30.4 % — LOW (ref 34.5–45)
HGB BLD-MCNC: 9.8 G/DL — LOW (ref 11.5–15.5)
HGB BLD-MCNC: 9.8 G/DL — LOW (ref 11.5–15.5)
MCHC RBC-ENTMCNC: 27.7 PG — SIGNIFICANT CHANGE UP (ref 27–34)
MCHC RBC-ENTMCNC: 27.7 PG — SIGNIFICANT CHANGE UP (ref 27–34)
MCHC RBC-ENTMCNC: 32.2 GM/DL — SIGNIFICANT CHANGE UP (ref 32–36)
MCHC RBC-ENTMCNC: 32.2 GM/DL — SIGNIFICANT CHANGE UP (ref 32–36)
MCV RBC AUTO: 85.9 FL — SIGNIFICANT CHANGE UP (ref 80–100)
MCV RBC AUTO: 85.9 FL — SIGNIFICANT CHANGE UP (ref 80–100)
NRBC # BLD: 0 /100 WBCS — SIGNIFICANT CHANGE UP (ref 0–0)
NRBC # BLD: 0 /100 WBCS — SIGNIFICANT CHANGE UP (ref 0–0)
PLATELET # BLD AUTO: 283 K/UL — SIGNIFICANT CHANGE UP (ref 150–400)
PLATELET # BLD AUTO: 283 K/UL — SIGNIFICANT CHANGE UP (ref 150–400)
POTASSIUM SERPL-MCNC: 4 MMOL/L — SIGNIFICANT CHANGE UP (ref 3.5–5.3)
POTASSIUM SERPL-MCNC: 4 MMOL/L — SIGNIFICANT CHANGE UP (ref 3.5–5.3)
POTASSIUM SERPL-SCNC: 4 MMOL/L — SIGNIFICANT CHANGE UP (ref 3.5–5.3)
POTASSIUM SERPL-SCNC: 4 MMOL/L — SIGNIFICANT CHANGE UP (ref 3.5–5.3)
RBC # BLD: 3.54 M/UL — LOW (ref 3.8–5.2)
RBC # BLD: 3.54 M/UL — LOW (ref 3.8–5.2)
RBC # FLD: 13.5 % — SIGNIFICANT CHANGE UP (ref 10.3–14.5)
RBC # FLD: 13.5 % — SIGNIFICANT CHANGE UP (ref 10.3–14.5)
SODIUM SERPL-SCNC: 139 MMOL/L — SIGNIFICANT CHANGE UP (ref 135–145)
SODIUM SERPL-SCNC: 139 MMOL/L — SIGNIFICANT CHANGE UP (ref 135–145)
WBC # BLD: 8.97 K/UL — SIGNIFICANT CHANGE UP (ref 3.8–10.5)
WBC # BLD: 8.97 K/UL — SIGNIFICANT CHANGE UP (ref 3.8–10.5)
WBC # FLD AUTO: 8.97 K/UL — SIGNIFICANT CHANGE UP (ref 3.8–10.5)
WBC # FLD AUTO: 8.97 K/UL — SIGNIFICANT CHANGE UP (ref 3.8–10.5)

## 2023-11-22 RX ORDER — ACETAMINOPHEN 500 MG
975 TABLET ORAL EVERY 8 HOURS
Refills: 0 | Status: DISCONTINUED | OUTPATIENT
Start: 2023-11-22 | End: 2023-11-22

## 2023-11-22 RX ORDER — ASPIRIN/CALCIUM CARB/MAGNESIUM 324 MG
1 TABLET ORAL
Qty: 30 | Refills: 0
Start: 2023-11-22 | End: 2023-12-21

## 2023-11-22 RX ORDER — ACETAMINOPHEN 500 MG
1000 TABLET ORAL ONCE
Refills: 0 | Status: COMPLETED | OUTPATIENT
Start: 2023-11-22 | End: 2023-11-22

## 2023-11-22 RX ORDER — OXYCODONE HYDROCHLORIDE 5 MG/1
1 TABLET ORAL
Qty: 20 | Refills: 0
Start: 2023-11-22 | End: 2023-11-26

## 2023-11-22 RX ORDER — DIAZEPAM 5 MG
1 TABLET ORAL
Qty: 9 | Refills: 0
Start: 2023-11-22 | End: 2023-11-24

## 2023-11-22 RX ORDER — NALOXONE HYDROCHLORIDE 4 MG/.1ML
4 SPRAY NASAL
Qty: 1 | Refills: 0
Start: 2023-11-22 | End: 2023-11-22

## 2023-11-22 RX ADMIN — Medication 100 MILLIGRAM(S): at 15:15

## 2023-11-22 RX ADMIN — SODIUM CHLORIDE 75 MILLILITER(S): 9 INJECTION, SOLUTION INTRAVENOUS at 11:38

## 2023-11-22 RX ADMIN — Medication 15 MILLIGRAM(S): at 00:27

## 2023-11-22 RX ADMIN — Medication 75 MILLIGRAM(S): at 21:01

## 2023-11-22 RX ADMIN — SODIUM CHLORIDE 125 MILLILITER(S): 9 INJECTION, SOLUTION INTRAVENOUS at 03:57

## 2023-11-22 RX ADMIN — Medication 1000 MILLIGRAM(S): at 16:12

## 2023-11-22 RX ADMIN — Medication 400 MILLIGRAM(S): at 15:42

## 2023-11-22 RX ADMIN — ENOXAPARIN SODIUM 40 MILLIGRAM(S): 100 INJECTION SUBCUTANEOUS at 06:22

## 2023-11-22 RX ADMIN — Medication 100 MILLIGRAM(S): at 03:57

## 2023-11-22 RX ADMIN — Medication 100 MILLIGRAM(S): at 21:01

## 2023-11-22 RX ADMIN — Medication 15 MILLIGRAM(S): at 23:20

## 2023-11-22 RX ADMIN — Medication 15 MILLIGRAM(S): at 06:36

## 2023-11-22 RX ADMIN — Medication 1000 MILLIGRAM(S): at 08:10

## 2023-11-22 RX ADMIN — Medication 15 MILLIGRAM(S): at 23:04

## 2023-11-22 RX ADMIN — Medication 15 MILLIGRAM(S): at 00:42

## 2023-11-22 RX ADMIN — Medication 15 MILLIGRAM(S): at 12:08

## 2023-11-22 RX ADMIN — Medication 15 MILLIGRAM(S): at 11:38

## 2023-11-22 RX ADMIN — Medication 15 MILLIGRAM(S): at 17:16

## 2023-11-22 RX ADMIN — Medication 15 MILLIGRAM(S): at 06:22

## 2023-11-22 RX ADMIN — Medication 400 MILLIGRAM(S): at 07:40

## 2023-11-22 NOTE — DISCHARGE NOTE PROVIDER - NSDCFUADDINST_GEN_ALL_CORE_FT
Activity:  - Rest at home during the first few days after surgery.  - Walking is encouraged, but strenuous exercise is not allowed until 6 weeks after surgery.   - Avoid strenuous activity. Do not lift your arms above your head. Do not lift more than 5-10 pounds.    Sleep:  Sleep on your back for the first two weeks after surgery.    Showering:  - You may take sponge baths following discharge. Pat dry. We will instruct you to shower once you have drains removed from your breasts in the office.  - Do not take a bath or submerge yourself in water.  - You will have special adhesive glue or tape over the incisions. Do not take these off.    For the Breast:  You have just undergone a breast reconstruction with the NICOLLE flap. Your breast will likely have bruising and possibly some blistering on the skin, which is expected after a mastectomy. You have a small patch of skin on your breasts, which is a different color than the surrounding breast skin. This paddle of skin comes from the abdomen and is an indicator of how the flap is doing. It is important to check this skin paddle daily. The skin should remain the same color. If the color of the small patch changes (i.e blue, purple, pale), please call the office immediately.    For the Abdomen:  Your incision and belly button are covered in a special medical grade sealant, which will come off in the office, 2-3 weeks after surgery.    Drains:  Both the breast and abdomen will have drains. It is important to empty the drains twice daily and record the outputs. Please bring this sheet to your appointment after surgery. Based on the output, the drains will be removed 1 to 3 weeks after surgery. For the drains to be working appropriately, the bulbs need to be collapsed to create a light suction. The nurse in the hospital will review the drain care with you and your family prior to discharge home. It is best to safely secure the drains to your clothes with a safety pin.    In an effort to make you more comfortable with discharge home and to answer any questions you may have, these instructions are for you. However, you may call the office at at any time with additional questions or concerns.    Call the Office:  Do not hesitate to call the office with any concerns or questions. A doctor is available to answer your questions 24 hours a day.   Please notify us if:  1. You have increased swelling, pain, or color change in the breast.  2. One breast becomes suddenly significantly larger than the other breast.  3. You have a sudden increase in swelling of the abdomen.  4. You have redness develop around the incisions.  5. You have a fever greater than 101 F.  6. You develop sudden increase in pain.  7. You develop drainage, spreading redness or foul odor  8. You have any questions.

## 2023-11-22 NOTE — DISCHARGE NOTE PROVIDER - NSDCACTIVITY_GEN_ALL_CORE
Follow Instructions Provided by your Surgical Team Do not drive or operate machinery/Do not make important decisions/Stairs allowed/Walking - Indoors allowed/No heavy lifting/straining/Walking - Outdoors allowed/Follow Instructions Provided by your Surgical Team

## 2023-11-22 NOTE — DISCHARGE NOTE PROVIDER - CARE PROVIDERS DIRECT ADDRESSES
,[email or web address]'s [email or web address] ,[email or web address] ,[email or web address]

## 2023-11-22 NOTE — PROGRESS NOTE ADULT - NS ATTEND AMEND GEN_ALL_CORE FT
pt seen and examined  comfortable  doing well post op  transition care from critical care to surgical team  ellen BOX/c ellen cross/kerwin ivf  pain control  wound care and dispo planning as per surgery

## 2023-11-22 NOTE — DISCHARGE NOTE PROVIDER - PROVIDER TOKENS
PROVIDER:[TOKEN:[[id number]],FOLLOWUP:[2 weeks]],PROVIDER:[TOKEN:[[id number]],FOLLOWUP:[1 week]]

## 2023-11-22 NOTE — PROGRESS NOTE ADULT - SUBJECTIVE AND OBJECTIVE BOX
Pt. seen and examined  Pain controlled  Skin and NICOLLE flaps viable, drains serosang    POD1  Stable  Mgmt as per Dr. Siddiqui  Will call pt. w path results when available  F/U Dr. Siddiqui next week and Dr. Gross 3 mos

## 2023-11-22 NOTE — DISCHARGE NOTE PROVIDER - CARE PROVIDER_API CALL
Shahzad Gross [place]  Surgery  [address] [place] [address]  Phone: [phone]  Fax: [phone]  Follow Up Time: 2 weeks    Hugh Siddiqui)  Plastic Surgery  [address] [place] [address] [address]  [place] [address]  Phone: [phone]  Fax: [phone]  Follow Up Time: 1 week   Shahzad Gross [place]  Surgery  [address] [place] [address]  Phone: [phone]  Fax: [phone]  Follow Up Time: 2 weeks    Hugh Siddiqui)  Plastic Surgery  [address] [place] [address] [address]  [place] [address]  Phone: [phone]  Fax: [phone]  Follow Up Time: 1 week   Shahzad Gross [place]  Surgery  [address] [place] [address]  Phone: [phone]  Fax: [phone]  Follow Up Time: 2 weeks    Hugh Siddiqui)  Plastic Surgery  [address] [place] [address] [address]  [place] [address]  Phone: [phone]  Fax: [phone]  Follow Up Time: 1 week

## 2023-11-22 NOTE — PROGRESS NOTE ADULT - ATTENDING COMMENTS
Pt seen and examined.  Agree with above  No significant events  Pain under control    Breast - flaps are warm, no congestion, soft, Vioptix stable, mary serosang  Abd- incision cdi, mary serosang    Plan - POD#1  -Cont flap check/vioptix  -Cont Toradol IV  -DVT prophyl  -Reg diet  -No caffeine  -OOB

## 2023-11-22 NOTE — DISCHARGE NOTE PROVIDER - HOSPITAL COURSE
Pt is a 33yF who is s/p L mastectomy, R TE removal with capsulectomy, port removal, recon with b/l DIEPs on 11/21, since doing well. She was monitored postoperatively in PACU and in the telemetry unit without issue. She was there for flap checks. On the day of discharge, patient was ambulating, tolerating PO, electrolytes repleted as necessary, performing ADLs as necessary, stable for discharge with appropriate outpatient care and follow-up.

## 2023-11-22 NOTE — DISCHARGE NOTE NURSING/CASE MANAGEMENT/SOCIAL WORK - PATIENT PORTAL LINK FT
You can access the FollowMyHealth Patient Portal offered by [facility] by registering at the following website: [email or web address]. By joining [facility]’s FollowMyHealth portal, you will also be able to view your health information using other applications (apps) compatible with our system.

## 2023-11-22 NOTE — PROGRESS NOTE ADULT - SUBJECTIVE AND OBJECTIVE BOX
Plastic Surgery Progress Note    Subjective: seen on rounds, no issues    Objective:  Exam:   General: NAD  Neuro: Alert  Pulm: comfortable  Breasts/abd: breasts and abd soft, incisions clean and intact, drains with s/s output, flaps WWP and with stable ViOptix    Vital Signs Last 24 Hrs  T(C): 36.7 (22 Nov 2023 04:00), Max: 37.2 (21 Nov 2023 18:02)  T(F): 98 (22 Nov 2023 04:00), Max: 99 (21 Nov 2023 18:02)  HR: 83 (22 Nov 2023 06:00) (79 - 116)  BP: 100/62 (22 Nov 2023 06:00) (91/56 - 116/70)  BP(mean): 75 (22 Nov 2023 06:00) (67 - 88)  RR: 14 (22 Nov 2023 06:00) (12 - 23)  SpO2: 99% (22 Nov 2023 06:00) (94% - 100%)    Parameters below as of 22 Nov 2023 06:00  Patient On (Oxygen Delivery Method): nasal cannula  O2 Flow (L/min): 2      I&O's Detail    21 Nov 2023 07:01  -  22 Nov 2023 07:00  --------------------------------------------------------  IN:    Lactated Ringers: 1690 mL  Total IN: 1690 mL    OUT:    Drain (mL): 15 mL    Drain (mL): 13.5 mL    Drain (mL): 25 mL    Drain (mL): 42.5 mL    Drain (mL): 37.5 mL    Drain (mL): 13.5 mL    Indwelling Catheter - Urethral (mL): 1565 mL  Total OUT: 1712 mL    Total NET: -22 mL      MEDICATIONS  (STANDING):  acetaminophen     Tablet .. 975 milliGRAM(s) Oral every 8 hours  ceFAZolin   IVPB 2000 milliGRAM(s) IV Intermittent every 8 hours  enoxaparin Injectable 40 milliGRAM(s) SubCutaneous every 24 hours  ketorolac   Injectable 15 milliGRAM(s) IV Push every 6 hours  lactated ringers. 1000 milliLiter(s) (75 mL/Hr) IV Continuous <Continuous>  venlafaxine XR. 75 milliGRAM(s) Oral daily    MEDICATIONS  (PRN):  aluminum hydroxide/magnesium hydroxide/simethicone Suspension 30 milliLiter(s) Oral every 4 hours PRN Gas  calcium carbonate    500 mG (Tums) Chewable 1 Tablet(s) Chew every 4 hours PRN Dyspepsia  diazepam    Tablet 5 milliGRAM(s) Oral every 8 hours PRN Muscle spasm  diphenhydrAMINE 25 milliGRAM(s) Oral every 4 hours PRN Itching  loratadine 10 milliGRAM(s) Oral daily PRN allergies  ondansetron Injectable 4 milliGRAM(s) IV Push every 6 hours PRN Nausea and/or Vomiting  oxyCODONE    IR 5 milliGRAM(s) Oral every 4 hours PRN Moderate Pain (4 - 6)  oxyCODONE    IR 10 milliGRAM(s) Oral every 4 hours PRN Severe Pain (7 - 10)      LABS:                        9.8    8.97  )-----------( 283      ( 22 Nov 2023 05:00 )             30.4     11-22    139  |  105  |  11  ----------------------------<  116<H>  4.0   |  29  |  0.72    Ca    8.2<L>      22 Nov 2023 05:00          Urinalysis Basic - ( 22 Nov 2023 05:00 )    Color: x / Appearance: x / SG: x / pH: x  Gluc: 116 mg/dL / Ketone: x  / Bili: x / Urobili: x   Blood: x / Protein: x / Nitrite: x   Leuk Esterase: x / RBC: x / WBC x   Sq Epi: x / Non Sq Epi: x / Bacteria: x

## 2023-11-22 NOTE — PROGRESS NOTE ADULT - SUBJECTIVE AND OBJECTIVE BOX
Offers no complaints this AM, states she rested well overnight.  Eager to get out of bed.  Walker removed, due to void.  Tolerating ice chips.   Denies chest pain, nausea, vomiting, lightheadedness, dizziness.    Vital Signs Last 24 Hrs  T(C): 36.7 (22 Nov 2023 04:00), Max: 37.2 (21 Nov 2023 18:02)  T(F): 98 (22 Nov 2023 04:00), Max: 99 (21 Nov 2023 18:02)  HR: 83 (22 Nov 2023 06:00) (79 - 116)  BP: 100/62 (22 Nov 2023 06:00) (91/56 - 116/70)  BP(mean): 75 (22 Nov 2023 06:00) (67 - 88)  RR: 14 (22 Nov 2023 06:00) (12 - 23)  SpO2: 99%  RA (22 Nov 2023 06:00) (94% - 100%)    Labs                        9.8    8.97  )-----------( 283      ( 22 Nov 2023 05:00 )             30.4     139  |  105  |  11  ----------------------------<  116<H>  4.0   |  29  |  0.72    Ca    8.2<L>      22 Nov 2023 05:00    Current Medications  acetaminophen   IVPB .. 1000 milliGRAM(s) IV Intermittent once  ceFAZolin   IVPB 2000 milliGRAM(s) IV Intermittent every 8 hours  enoxaparin Injectable 40 milliGRAM(s) SubCutaneous every 24 hours  ketorolac   Injectable 15 milliGRAM(s) IV Push every 6 hours  lactated ringers. 1000 milliLiter(s) (75 mL/Hr) IV Continuous <Continuous>  venlafaxine XR. 75 milliGRAM(s) Oral daily  aluminum hydroxide/magnesium hydroxide/simethicone Suspension 30 milliLiter(s) Oral every 4 hours PRN Gas  calcium carbonate    500 mG (Tums) Chewable 1 Tablet(s) Chew every 4 hours PRN Dyspepsia  diazepam    Tablet 5 milliGRAM(s) Oral every 8 hours PRN Muscle spasm  diphenhydrAMINE 25 milliGRAM(s) Oral every 4 hours PRN Itching  loratadine 10 milliGRAM(s) Oral daily PRN allergies  ondansetron Injectable 4 milliGRAM(s) IV Push every 6 hours PRN Nausea and/or Vomiting  oxyCODONE    IR 5 milliGRAM(s) Oral every 4 hours PRN Moderate Pain (4 - 6)  oxyCODONE    IR 10 milliGRAM(s) Oral every 4 hours PRN Severe Pain (7 - 10)      Physical Exam  Gen:  WN/WD Female resting in bed, NAD  ENT:  NC/AT, no JVD noted  Thorax:  Symmetric, no retractions.  Breasts soft, surgical sites C/D/I  Lung:  CTA b/l  CV:  S1, S2. RRR  Abd:  Soft, NT/ND.  BS normoactive, no masses to palp.  Surgical incision DINORAH, C/D/I  Extrem:  No C/C/E, DP/radial pulses +2  Neuro:  No gross motor/sensory deficits  Psych:  Alert and oriented x3, calm and cooperative HPI:  34 y/o female with PMH of right breast cancer, s/p mastectomy, completed chemo 2023, RT completed 3/2023, who underwent NICOLLE on 22    Today    Offers no complaints this AM, states she rested well overnight.  Eager to get out of bed.  Walker removed, due to void.  Tolerating ice chips.   Denies chest pain, nausea, vomiting, lightheadedness, dizziness.    PAST MEDICAL & SURGICAL HISTORY:  Malignant neoplasm of unspecified site of right female breast  Breast cancer, right   (normal spontaneous vaginal delivery)  History of chemotherapy  S/P radiation therapy  History of tonsillectomy  History of right total mastectomy  H/O bilateral salpingo-oophorectomy  Port-A-Cath in place        FAMILY HISTORY:  FH: HTN (hypertension) (Father)  Family history of diabetes mellitus (DM) (Grandparent)  FH: colon cancer (Grandparent)  FH: hypothyroidism (Mother)    Social History: Denies smoking, drinking drug use    Home Medications:  biotin 10,000 mcg oral capsule: 1 cap(s) orally once a day (2023 14:25)  exemestane 25 mg oral tablet: 1 tab(s) orally once a day (2023 06:06)  venlafaxine 75 mg oral capsule, extended release: 1 cap(s) orally once a day (2023 06:06)  Xyzal 5 mg oral tablet: 1 tab(s) orally once a day as needed for  allergy symptoms (2023 06:06)    Allergies    No Known Allergies    Intolerances        Vital Signs Last 24 Hrs  T(C): 36.7 (2023 04:00), Max: 37.2 (2023 18:02)  T(F): 98 (2023 04:00), Max: 99 (2023 18:02)  HR: 83 (2023 06:00) (79 - 116)  BP: 100/62 (2023 06:00) (91/56 - 116/70)  BP(mean): 75 (2023 06:00) (67 - 88)  RR: 14 (2023 06:00) (12 - 23)  SpO2: 99%  RA (2023 06:00) (94% - 100%)    Labs                        9.8    8.97  )-----------( 283      ( 2023 05:00 )             30.4     139  |  105  |  11  ----------------------------<  116<H>  4.0   |  29  |  0.72    Ca    8.2<L>      2023 05:00    Current Medications  acetaminophen   IVPB .. 1000 milliGRAM(s) IV Intermittent once  ceFAZolin   IVPB 2000 milliGRAM(s) IV Intermittent every 8 hours  enoxaparin Injectable 40 milliGRAM(s) SubCutaneous every 24 hours  ketorolac   Injectable 15 milliGRAM(s) IV Push every 6 hours  lactated ringers. 1000 milliLiter(s) (75 mL/Hr) IV Continuous <Continuous>  venlafaxine XR. 75 milliGRAM(s) Oral daily  aluminum hydroxide/magnesium hydroxide/simethicone Suspension 30 milliLiter(s) Oral every 4 hours PRN Gas  calcium carbonate    500 mG (Tums) Chewable 1 Tablet(s) Chew every 4 hours PRN Dyspepsia  diazepam    Tablet 5 milliGRAM(s) Oral every 8 hours PRN Muscle spasm  diphenhydrAMINE 25 milliGRAM(s) Oral every 4 hours PRN Itching  loratadine 10 milliGRAM(s) Oral daily PRN allergies  ondansetron Injectable 4 milliGRAM(s) IV Push every 6 hours PRN Nausea and/or Vomiting  oxyCODONE    IR 5 milliGRAM(s) Oral every 4 hours PRN Moderate Pain (4 - 6)  oxyCODONE    IR 10 milliGRAM(s) Oral every 4 hours PRN Severe Pain (7 - 10)      Physical Exam  Gen:  WN/WD Female resting in bed, NAD  ENT:  NC/AT, no JVD noted  Thorax:  Symmetric, no retractions.  Breasts soft, surgical sites C/D/I  Lung:  CTA b/l  CV:  S1, S2. RRR  Abd:  Soft, NT/ND.  BS normoactive, no masses to palp.  Surgical incision DINORAH, C/D/I  Extrem:  No C/C/E, DP/radial pulses +2  Neuro:  No gross motor/sensory deficits  Psych:  Alert and oriented x3, calm and cooperative  Skin - incision sites intact no wound dehiscence b/l breast soft, drains with serous sanguinous fluid

## 2023-11-22 NOTE — PROGRESS NOTE ADULT - SUBJECTIVE AND OBJECTIVE BOX
HPI:    33y Female PMH of Breast CA, now POD#___1__ from b/l Mastectomy, with NICOLLE/free flap placement, as well as VIOPTIX tissue/flap oxygenation monitoring.     Current Vioptix:    Right Breast: 57%  Left Breast: 56%    Current Signal Strength    Right Breast: 95  Left Breast:  93      Interval Hx:      PAST MEDICAL & SURGICAL HISTORY:  Malignant neoplasm of unspecified site of right female breast      Breast cancer, right       (normal spontaneous vaginal delivery)      History of chemotherapy      S/P radiation therapy      History of tonsillectomy      History of right total mastectomy      H/O bilateral salpingo-oophorectomy      Port-A-Cath in place          MEDICATIONS  (STANDING):  ceFAZolin   IVPB 2000 milliGRAM(s) IV Intermittent every 8 hours  enoxaparin Injectable 40 milliGRAM(s) SubCutaneous every 24 hours  ketorolac   Injectable 15 milliGRAM(s) IV Push every 6 hours  lactated ringers. 1000 milliLiter(s) (75 mL/Hr) IV Continuous <Continuous>  venlafaxine XR. 75 milliGRAM(s) Oral daily    MEDICATIONS  (PRN):  aluminum hydroxide/magnesium hydroxide/simethicone Suspension 30 milliLiter(s) Oral every 4 hours PRN Gas  calcium carbonate    500 mG (Tums) Chewable 1 Tablet(s) Chew every 4 hours PRN Dyspepsia  diazepam    Tablet 5 milliGRAM(s) Oral every 8 hours PRN Muscle spasm  diphenhydrAMINE 25 milliGRAM(s) Oral every 4 hours PRN Itching  loratadine 10 milliGRAM(s) Oral daily PRN allergies  ondansetron Injectable 4 milliGRAM(s) IV Push every 6 hours PRN Nausea and/or Vomiting  oxyCODONE    IR 5 milliGRAM(s) Oral every 4 hours PRN Moderate Pain (4 - 6)  oxyCODONE    IR 10 milliGRAM(s) Oral every 4 hours PRN Severe Pain (7 - 10)      Review of Systems:  CONSTITUTIONAL: No fever, chills, or fatigue  EYES: No eye pain, visual disturbances, or discharge  ENMT:  No difficulty hearing, tinnitus, vertigo; No sinus or throat pain  NECK: No pain or stiffness  RESPIRATORY: No cough, wheezing, chills or hemoptysis; No shortness of breath  CARDIOVASCULAR: No chest pain, palpitations, dizziness, or leg swelling  GASTROINTESTINAL: No abdominal or epigastric pain. No nausea, vomiting, or hematemesis; No diarrhea or constipation. No melena or hematochezia.  GENITOURINARY: No dysuria, frequency, hematuria, or incontinence  NEUROLOGICAL: No headaches, memory loss, loss of strength, numbness, or tremors  SKIN: No itching, burning, rashes, or lesions   MUSCULOSKELETAL: No joint pain or swelling; No muscle, back, or extremity pain  PSYCHIATRIC: No depression, anxiety, mood swings, or difficulty sleeping      ICU Vital Signs Last 24 Hrs  T(C): 36.6 (2023 09:00), Max: 36.7 (2023 00:00)  T(F): 97.9 (2023 09:00), Max: 98 (2023 00:00)  HR: 104 (:00) (79 - 112)  BP: 91/61 (:00) (91/56 - 115/76)  BP(mean): 70 (:) (67 - 88)  ABP: --  ABP(mean): --  RR: 21 (2023 17:00) (12 - 22)  SpO2: 100% (2023 17:00) (97% - 100%)    O2 Parameters below as of 2023 17:00  Patient On (Oxygen Delivery Method): room air                                    9.8    8.97  )-----------( 283      ( 2023 05:00 )             30.4       11-    139  |  105  |  11  ----------------------------<  116<H>  4.0   |  29  |  0.72    Ca    8.2<L>      2023 05:00            Physical Examination:    General: No acute distress.  Alert, oriented, interactive, nonfocal    BREAST: breasts appears symmetrical, no signs of hematoma, soft to palpation, non tender, well perfused, turgor maintained,cap refill adequate. Viotpix in place functioning well. HÉCTOR drains in place with serosanguinous drainage.     HEENT: Pupils equal, reactive to light.  Symmetric.    PULM: Clear to auscultation bilaterally, no significant sputum production    CVS: Regular rate and rhythm, no murmurs, rubs, or gallops    ABD: Soft, nondistended, nontender, normoactive bowel sounds, no masses. Flap removal site well approximated, staples in palce. No ative drainage, bleeding, or signs of infection.    EXT: No edema, nontender    SKIN: Warm and well perfused, no rashes noted.

## 2023-11-22 NOTE — DISCHARGE NOTE PROVIDER - NSDCMRMEDTOKEN_GEN_ALL_CORE_FT
biotin 10,000 mcg oral capsule: 1 cap(s) orally once a day  exemestane 25 mg oral tablet: 1 tab(s) orally once a day  venlafaxine 75 mg oral capsule, extended release: 1 cap(s) orally once a day  Xyzal 5 mg oral tablet: 1 tab(s) orally once a day as needed for  allergy symptoms   aspirin 81 mg oral tablet, chewable: 1 tab(s) chewed once a day  biotin 10,000 mcg oral capsule: 1 cap(s) orally once a day  cefadroxil 500 mg oral capsule: 1 cap(s) orally 2 times a day  exemestane 25 mg oral tablet: 1 tab(s) orally once a day  naloxone 4 mg/0.1 mL nasal spray: 4 milligram(s) intranasally once a day as needed for  excess sedation  oxyCODONE 5 mg oral tablet: 1 tab(s) orally 4 times a day as needed for  moderate or severe pain MDD: 4 tabs  Valium 5 mg oral tablet: 1 tab(s) orally 3 times a day as needed for  muscle spasm Do not take at the same time as oxycodone. Separate doses by 2-3 hours MDD: 3 tabs  venlafaxine 75 mg oral capsule, extended release: 1 cap(s) orally once a day  Xyzal 5 mg oral tablet: 1 tab(s) orally once a day as needed for  allergy symptoms

## 2023-11-22 NOTE — DISCHARGE NOTE PROVIDER - NSDCCPCAREPLAN_GEN_ALL_CORE_FT
PRINCIPAL DISCHARGE DIAGNOSIS  Diagnosis: Malignant neoplasm of unspecified site of right female breast  Assessment and Plan of Treatment:

## 2023-11-22 NOTE — DISCHARGE NOTE PROVIDER - NSDCFUSCHEDAPPT_GEN_ALL_CORE_FT
[facility]  Candice Suazo  Scheduled Appointment: 12/01/2023     [facility]  Candice Suazo  Scheduled Appointment: 12/01/2023     [facility]  Candice Suazo  Scheduled Appointment: 12/01/2023

## 2023-11-22 NOTE — DISCHARGE NOTE PROVIDER - NSDCCPTREATMENT_GEN_ALL_CORE_FT
PRINCIPAL PROCEDURE  Procedure: NICOLLE flap, free  Findings and Treatment:       SECONDARY PROCEDURE  Procedure: Simple mastectomy  Findings and Treatment: Left - prophylactic    Procedure: Capsulectomy, breast  Findings and Treatment:

## 2023-11-22 NOTE — DISCHARGE NOTE NURSING/CASE MANAGEMENT/SOCIAL WORK - NSDCPEFALRISK_GEN_ALL_CORE
For information on Fall & Injury Prevention, visit: [email or web address] OR  [email or web address] OR  https://www.cdc.gov/steadi/patient.html

## 2023-11-22 NOTE — PROGRESS NOTE ADULT - SUBJECTIVE AND OBJECTIVE BOX
POD #: 1    Overnight Events:  No acute events overnight    SUBJECTIVE:  Reports minimal pain  Denies nausea, vomiting  Tolerating diet    OBJECTIVE:  Vital Signs Last 24 Hrs  T(C): 36.7 (22 Nov 2023 04:00), Max: 37.2 (21 Nov 2023 18:02)  T(F): 98 (22 Nov 2023 04:00), Max: 99 (21 Nov 2023 18:02)  HR: 83 (22 Nov 2023 06:00) (79 - 116)  BP: 100/62 (22 Nov 2023 06:00) (91/56 - 116/70)  BP(mean): 75 (22 Nov 2023 06:00) (67 - 88)  RR: 14 (22 Nov 2023 06:00) (12 - 23)  SpO2: 99% (22 Nov 2023 06:00) (94% - 100%)    Parameters below as of 22 Nov 2023 06:00  Patient On (Oxygen Delivery Method): nasal cannula  O2 Flow (L/min): 2        Physical Examination:  GEN: NAD, resting quietly  NEURO: AAOx3, CN II-XII grossly intact, no focal deficits  PULM: symmetric chest rise bilaterally, no increased WOB  CHEST: incisions CDI, no erythema, drains in place  ABD: soft, nontender, nondistended, incision CDI with drains in place  EXTR: no lower extremity edema, moving all extremities    LABS:                        9.8    8.97  )-----------( 283      ( 22 Nov 2023 05:00 )             30.4       11-22    139  |  105  |  11  ----------------------------<  116<H>  4.0   |  29  |  0.72    Ca    8.2<L>      22 Nov 2023 05:00

## 2023-11-23 VITALS
SYSTOLIC BLOOD PRESSURE: 95 MMHG | RESPIRATION RATE: 16 BRPM | DIASTOLIC BLOOD PRESSURE: 58 MMHG | HEART RATE: 95 BPM | OXYGEN SATURATION: 95 % | TEMPERATURE: 98 F

## 2023-11-23 PROCEDURE — 85027 COMPLETE CBC AUTOMATED: CPT

## 2023-11-23 PROCEDURE — 88307 TISSUE EXAM BY PATHOLOGIST: CPT

## 2023-11-23 PROCEDURE — 87075 CULTR BACTERIA EXCEPT BLOOD: CPT

## 2023-11-23 PROCEDURE — 87070 CULTURE OTHR SPECIMN AEROBIC: CPT

## 2023-11-23 PROCEDURE — 80048 BASIC METABOLIC PNL TOTAL CA: CPT

## 2023-11-23 PROCEDURE — 36415 COLL VENOUS BLD VENIPUNCTURE: CPT

## 2023-11-23 PROCEDURE — 88304 TISSUE EXAM BY PATHOLOGIST: CPT

## 2023-11-23 PROCEDURE — C1889: CPT

## 2023-11-23 PROCEDURE — C1781: CPT

## 2023-11-23 PROCEDURE — 88300 SURGICAL PATH GROSS: CPT

## 2023-11-23 PROCEDURE — 88305 TISSUE EXAM BY PATHOLOGIST: CPT

## 2023-11-23 RX ORDER — ACETAMINOPHEN 500 MG
650 TABLET ORAL EVERY 6 HOURS
Refills: 0 | Status: DISCONTINUED | OUTPATIENT
Start: 2023-11-23 | End: 2023-11-23

## 2023-11-23 RX ADMIN — Medication 650 MILLIGRAM(S): at 08:00

## 2023-11-23 RX ADMIN — Medication 15 MILLIGRAM(S): at 11:31

## 2023-11-23 RX ADMIN — ENOXAPARIN SODIUM 40 MILLIGRAM(S): 100 INJECTION SUBCUTANEOUS at 06:37

## 2023-11-23 RX ADMIN — Medication 650 MILLIGRAM(S): at 07:34

## 2023-11-23 RX ADMIN — Medication 15 MILLIGRAM(S): at 05:37

## 2023-11-23 RX ADMIN — Medication 650 MILLIGRAM(S): at 01:45

## 2023-11-23 RX ADMIN — Medication 650 MILLIGRAM(S): at 02:45

## 2023-11-23 RX ADMIN — Medication 15 MILLIGRAM(S): at 05:58

## 2023-11-23 RX ADMIN — Medication 100 MILLIGRAM(S): at 05:40

## 2023-11-23 NOTE — PROGRESS NOTE ADULT - SUBJECTIVE AND OBJECTIVE BOX
Follow-up Critical Care Progress Note  Chief Complaint : Malignant neoplasm of right female breast        doing well post op  no cp, sob, palp, n/v  seen by surgery states going home today  in good spirits  pain controlled     Allergies :No Known Allergies      PAST MEDICAL & SURGICAL HISTORY:  Malignant neoplasm of unspecified site of right female breast    Breast cancer, right     (normal spontaneous vaginal delivery)    History of chemotherapy    S/P radiation therapy    History of tonsillectomy    History of right total mastectomy    H/O bilateral salpingo-oophorectomy    Port-A-Cath in place        Medications:  MEDICATIONS  (STANDING):  ceFAZolin   IVPB 2000 milliGRAM(s) IV Intermittent every 8 hours  enoxaparin Injectable 40 milliGRAM(s) SubCutaneous every 24 hours  ketorolac   Injectable 15 milliGRAM(s) IV Push every 6 hours  lactated ringers. 1000 milliLiter(s) (75 mL/Hr) IV Continuous <Continuous>  venlafaxine XR. 75 milliGRAM(s) Oral daily    MEDICATIONS  (PRN):  acetaminophen     Tablet .. 650 milliGRAM(s) Oral every 6 hours PRN Mild Pain (1 - 3)  aluminum hydroxide/magnesium hydroxide/simethicone Suspension 30 milliLiter(s) Oral every 4 hours PRN Gas  calcium carbonate    500 mG (Tums) Chewable 1 Tablet(s) Chew every 4 hours PRN Dyspepsia  diazepam    Tablet 5 milliGRAM(s) Oral every 8 hours PRN Muscle spasm  diphenhydrAMINE 25 milliGRAM(s) Oral every 4 hours PRN Itching  loratadine 10 milliGRAM(s) Oral daily PRN allergies  ondansetron Injectable 4 milliGRAM(s) IV Push every 6 hours PRN Nausea and/or Vomiting  oxyCODONE    IR 5 milliGRAM(s) Oral every 4 hours PRN Moderate Pain (4 - 6)  oxyCODONE    IR 10 milliGRAM(s) Oral every 4 hours PRN Severe Pain (7 - 10)      Antibiotics History  ceFAZolin   IVPB 2000 milliGRAM(s) IV Intermittent every 8 hours, 23 @ 18:26, Stop order after: 3 Days      Heme Medications   enoxaparin Injectable 40 milliGRAM(s) SubCutaneous every 24 hours, 23 @ 00:00      GI Medications  aluminum hydroxide/magnesium hydroxide/simethicone Suspension 30 milliLiter(s) Oral every 4 hours, 23 @ 18:26, Routine PRN  calcium carbonate    500 mG (Tums) Chewable 1 Tablet(s) Chew every 4 hours, 23 18:26, Routine PRN       WBC Trend  23 @ 05:00   -  8.97    H/H Trend  23 @ 05:00   -   9.8<L>/ 30.4<L>  11-10-23 @ 10:51   -   11.6/ 34.7    Stool Occult Blood    Platelet Trend  23 @ 05:00   -  283    Trend Sodium  23 @ 05:00   -  139    Trend Potassium  23 @ 05:00   -  4.0    Trend Bun/Cr  23 05:00  BUN/CR -  11 / 0.72         Albumin Trend  11-10-23 @ 10:51   -   4.6  23 @ 10:15   -   4.8  23 @ 08:56   -   4.9  23 @ 17:00   -   4.1  23 @ 10:44   -   4.5  23 @ 18:01   -   4.1      PTT - PT - INR Trend  22 @ 05:58   -   27.1<L> - 11.7 - 1.02    Glucose Trend  23 @ 05:00   -  116<H> -- --        LABS:                        9.8    8.97  )-----------( 283      ( 2023 05:00 )             30.4         139  |  105  |  11  ----------------------------<  116<H>  4.0   |  29  |  0.72    Ca    8.2<L>      2023 05:00         VITALS:  T(C): 36.7 (23 @ 05:47), Max: 37.4 (23 @ 23:00)  T(F): 98 (23 @ 05:47), Max: 99.3 (23 @ 23:00)  HR: 95 (23 05:47) (95 - 105)  BP: 95/58 (11-23-23 @ 05:47) (91/61 - 105/59)  BP(mean): 71 (23 @ 23:00) (70 - 74)  ABP: --  ABP(mean): --  RR: 16 (23 @ 05:47) (15 - 21)  SpO2: 95% (23 05:47) (95% - 100%)  CVP(mm Hg): --  CVP(cm H2O): --    Ins and Outs     23 @ 07:01  -  23 @ 07:00  --------------------------------------------------------  IN: 1800 mL / OUT: 194.5 mL / NET: 1605.5 mL    23 @ 07:01  -  23 @ 14:11  --------------------------------------------------------  IN: 0 mL / OUT: 153 mL / NET: -153 mL        Height (cm): 167.6 (23 @ 05:28)  Weight (kg): 89.6 (23 @ 05:54)  BMI (kg/m2): 31.9 (23 @ 05:54)        I&O's Detail    2023 07:01  -  2023 07:00  --------------------------------------------------------  IN:    Lactated Ringers: 1800 mL  Total IN: 1800 mL    OUT:    Drain (mL): 11 mL    Drain (mL): 13 mL    Drain (mL): 42.5 mL    Drain (mL): 61.5 mL    Drain (mL): 44 mL    Drain (mL): 22.5 mL  Total OUT: 194.5 mL    Total NET: 1605.5 mL      2023 07:01  -  2023 14:11  --------------------------------------------------------  IN:  Total IN: 0 mL    OUT:    Drain (mL): 50 mL    Drain (mL): 15 mL    Drain (mL): 27 mL    Drain (mL): 10 mL    Drain (mL): 11 mL    Drain (mL): 40 mL  Total OUT: 153 mL    Total NET: -153 mL           Physical Examination:  GENERAL:               Alert, Oriented, No acute distress.    HEENT:                  No JVD, dry MM  PULM:                     Bilateral air entry, Clear to auscultation bilaterally, no significant sputum production, No Rales, No Rhonchi, No Wheezing  CVS:                         S1, S2,  No Murmur  ABD:                        Soft, nondistended, nontender, normoactive bowel sounds,   EXT:                         No edema, nontender,   NEURO:                  Alert, oriented, interactive, nonfocal, follows commands  PSYC:                      Calm, + Insight.

## 2023-11-23 NOTE — PROGRESS NOTE ADULT - SUBJECTIVE AND OBJECTIVE BOX
No significant events  Pain under control  Ambulated    Vital Signs Last 24 Hrs  T(C): 36.7 (23 Nov 2023 05:47), Max: 37.4 (22 Nov 2023 23:00)  T(F): 98 (23 Nov 2023 05:47), Max: 99.3 (22 Nov 2023 23:00)  HR: 95 (23 Nov 2023 05:47) (95 - 112)  BP: 95/58 (23 Nov 2023 05:47) (91/61 - 105/59)  BP(mean): 71 (22 Nov 2023 23:00) (70 - 84)  RR: 16 (23 Nov 2023 05:47) (15 - 21)  SpO2: 95% (23 Nov 2023 05:47) (95% - 100%)    Parameters below as of 23 Nov 2023 05:47  Patient On (Oxygen Delivery Method): room air    Breast- flaps are warm, no congestion, soft, vioptix stable, mary serosang  Abd - incision cdi, mary serosang

## 2023-11-23 NOTE — PROGRESS NOTE ADULT - ASSESSMENT
33 year old female POD #1 left mastectomy with immediate left NICOLLE flap reconstruction, delayed right breast reconstruction with NICOLLE flap    Plan  Monitor for voiding  Monitor VS, bioptics and drain outputs  Incentive spirometry 10x hour  Analgesias/antiemetics PRN  Wound care and labs as per surgical team  Restart home meds as tolerated  DVT ppx  
33y Female PMH of Breast CA, now POD#___1__ from b/l Mastectomy, with NICOLLE/free flap placement, as well as VIOPTIX tissue/flap oxygenation monitoring.           PLAN:        -Q2H Vioptix tissue oxygenation monitoring. Baseline vioptix as noted in HPI.  If VIOPTIX noted to drop by 20%  or more in a 30 minute time frame will alert primary surgical team  -ensure vioptix signal strength >80%  -serial flap assessment for signs of perfusion  -skin assessment for color, firmness, signs of hematoma or other changes  -abdominal incisions site  monitoring  -hourly checks on HÉCTOR drain output  -pain control  -Morning LABS  -have discussed case with eICU team, including attending physician  -any and all changes or concerns regarding NICOLLE procedure, flap, etc will be immedietly addressed with primary surgical team           TIME SPENT:  35 minutes of  time spent providing medical care for patient's acute illness/conditions that impairs at least one vital organ system and/or poses a high risk of imminent or life threatening deterioration in the patient's condition. It includes time spent evaluating and treating the patient's acute illness as well as time spent reviewing labs, radiology, discussing goals of care with patient and/or patient's family, and discussing the case with a multidisciplinary team, including the eICU, in an effort to prevent further life threatening deterioration or end organ damage. This time is independent of any procedures performed.    DATE OF ENTRY OF THIS NOTE IS EQUAL TO THE DATE OF SERVICES RENDERED 
Pt is a 33yF who is s/p L mastectomy, R TE removal with capsulectomy, recon with b/l DIEPs on 11/21, since doing well    - regular diet  - OOB/ambulate  - DVT ppx  - flap checks
33 year old female POD #1 left mastectomy with immediate left NICOLLE flap reconstruction, delayed right breast reconstruction with NICOLLE flap    Plan:  - post-operative care per PRS including pain, drain management, and discharge requirements  - Dr. Gross to follow up with pathology results via telephone when they return    Surgery  [phone]
33 year old female POD #2 left mastectomy with immediate left NICOLLE flap reconstruction, delayed right breast reconstruction with NICOLLE flap    Plan  doing well post op  no complaints  pain controlled  off vioptics today  incentive spirometry    Analgesias/antiemetics PRN  Wound care and labs as per surgical team  Restart home meds as tolerated  Dispo planning today as per surgery   
Plan - POD#2  Doing well    -OK to d/c home  -Bra and binder  -D/c with Percocet/Abx/Baby ASA x 1 month  -[employer] teaching  -Keep surgical sites dry  -Follow up next week at [phone]

## 2023-11-26 LAB
CULTURE RESULTS: SIGNIFICANT CHANGE UP
CULTURE RESULTS: SIGNIFICANT CHANGE UP
SPECIMEN SOURCE: SIGNIFICANT CHANGE UP
SPECIMEN SOURCE: SIGNIFICANT CHANGE UP

## 2023-11-27 ENCOUNTER — APPOINTMENT (OUTPATIENT)
Dept: PLASTIC SURGERY | Facility: CLINIC | Age: 33
End: 2023-11-27
Payer: COMMERCIAL

## 2023-11-27 VITALS
TEMPERATURE: 98.7 F | SYSTOLIC BLOOD PRESSURE: 129 MMHG | WEIGHT: 185 LBS | DIASTOLIC BLOOD PRESSURE: 91 MMHG | HEART RATE: 87 BPM | OXYGEN SATURATION: 97 % | BODY MASS INDEX: 29.73 KG/M2 | HEIGHT: 66 IN

## 2023-11-27 DIAGNOSIS — Z98.890 OTHER SPECIFIED POSTPROCEDURAL STATES: ICD-10-CM

## 2023-11-27 PROCEDURE — 99024 POSTOP FOLLOW-UP VISIT: CPT

## 2023-11-28 PROBLEM — Z92.21 PERSONAL HISTORY OF ANTINEOPLASTIC CHEMOTHERAPY: Chronic | Status: ACTIVE | Noted: 2023-11-20

## 2023-11-30 LAB
SURGICAL PATHOLOGY STUDY: SIGNIFICANT CHANGE UP
SURGICAL PATHOLOGY STUDY: SIGNIFICANT CHANGE UP

## 2023-12-01 ENCOUNTER — APPOINTMENT (OUTPATIENT)
Dept: INFUSION THERAPY | Facility: HOSPITAL | Age: 33
End: 2023-12-01

## 2023-12-01 ENCOUNTER — RESULT REVIEW (OUTPATIENT)
Age: 33
End: 2023-12-01

## 2023-12-01 ENCOUNTER — APPOINTMENT (OUTPATIENT)
Dept: HEMATOLOGY ONCOLOGY | Facility: CLINIC | Age: 33
End: 2023-12-01
Payer: COMMERCIAL

## 2023-12-01 DIAGNOSIS — R11.2 NAUSEA WITH VOMITING, UNSPECIFIED: ICD-10-CM

## 2023-12-01 DIAGNOSIS — T45.1X5A NAUSEA WITH VOMITING, UNSPECIFIED: ICD-10-CM

## 2023-12-01 LAB
ALBUMIN SERPL ELPH-MCNC: 4.3 G/DL — SIGNIFICANT CHANGE UP (ref 3.3–5)
ALBUMIN SERPL ELPH-MCNC: 4.3 G/DL — SIGNIFICANT CHANGE UP (ref 3.3–5)
ALP SERPL-CCNC: 118 U/L — SIGNIFICANT CHANGE UP (ref 40–120)
ALP SERPL-CCNC: 118 U/L — SIGNIFICANT CHANGE UP (ref 40–120)
ALT FLD-CCNC: 54 U/L — HIGH (ref 10–45)
ALT FLD-CCNC: 54 U/L — HIGH (ref 10–45)
ANION GAP SERPL CALC-SCNC: 11 MMOL/L — SIGNIFICANT CHANGE UP (ref 5–17)
ANION GAP SERPL CALC-SCNC: 11 MMOL/L — SIGNIFICANT CHANGE UP (ref 5–17)
AST SERPL-CCNC: 42 U/L — HIGH (ref 10–40)
AST SERPL-CCNC: 42 U/L — HIGH (ref 10–40)
BASOPHILS # BLD AUTO: 0.04 K/UL — SIGNIFICANT CHANGE UP (ref 0–0.2)
BASOPHILS # BLD AUTO: 0.04 K/UL — SIGNIFICANT CHANGE UP (ref 0–0.2)
BASOPHILS NFR BLD AUTO: 0.4 % — SIGNIFICANT CHANGE UP (ref 0–2)
BASOPHILS NFR BLD AUTO: 0.4 % — SIGNIFICANT CHANGE UP (ref 0–2)
BILIRUB SERPL-MCNC: 0.2 MG/DL — SIGNIFICANT CHANGE UP (ref 0.2–1.2)
BILIRUB SERPL-MCNC: 0.2 MG/DL — SIGNIFICANT CHANGE UP (ref 0.2–1.2)
BUN SERPL-MCNC: 13 MG/DL — SIGNIFICANT CHANGE UP (ref 7–23)
BUN SERPL-MCNC: 13 MG/DL — SIGNIFICANT CHANGE UP (ref 7–23)
CALCIUM SERPL-MCNC: 9.9 MG/DL — SIGNIFICANT CHANGE UP (ref 8.4–10.5)
CALCIUM SERPL-MCNC: 9.9 MG/DL — SIGNIFICANT CHANGE UP (ref 8.4–10.5)
CEA SERPL-MCNC: <0.6 NG/ML — SIGNIFICANT CHANGE UP (ref 0–3.8)
CEA SERPL-MCNC: <0.6 NG/ML — SIGNIFICANT CHANGE UP (ref 0–3.8)
CHLORIDE SERPL-SCNC: 102 MMOL/L — SIGNIFICANT CHANGE UP (ref 96–108)
CHLORIDE SERPL-SCNC: 102 MMOL/L — SIGNIFICANT CHANGE UP (ref 96–108)
CO2 SERPL-SCNC: 28 MMOL/L — SIGNIFICANT CHANGE UP (ref 22–31)
CO2 SERPL-SCNC: 28 MMOL/L — SIGNIFICANT CHANGE UP (ref 22–31)
CREAT SERPL-MCNC: 0.65 MG/DL — SIGNIFICANT CHANGE UP (ref 0.5–1.3)
CREAT SERPL-MCNC: 0.65 MG/DL — SIGNIFICANT CHANGE UP (ref 0.5–1.3)
EGFR: 118 ML/MIN/1.73M2 — SIGNIFICANT CHANGE UP
EGFR: 118 ML/MIN/1.73M2 — SIGNIFICANT CHANGE UP
EOSINOPHIL # BLD AUTO: 0.18 K/UL — SIGNIFICANT CHANGE UP (ref 0–0.5)
EOSINOPHIL # BLD AUTO: 0.18 K/UL — SIGNIFICANT CHANGE UP (ref 0–0.5)
EOSINOPHIL NFR BLD AUTO: 1.8 % — SIGNIFICANT CHANGE UP (ref 0–6)
EOSINOPHIL NFR BLD AUTO: 1.8 % — SIGNIFICANT CHANGE UP (ref 0–6)
GLUCOSE SERPL-MCNC: 91 MG/DL — SIGNIFICANT CHANGE UP (ref 70–99)
GLUCOSE SERPL-MCNC: 91 MG/DL — SIGNIFICANT CHANGE UP (ref 70–99)
HCT VFR BLD CALC: 30.7 % — LOW (ref 34.5–45)
HCT VFR BLD CALC: 30.7 % — LOW (ref 34.5–45)
HGB BLD-MCNC: 10 G/DL — LOW (ref 11.5–15.5)
HGB BLD-MCNC: 10 G/DL — LOW (ref 11.5–15.5)
IMM GRANULOCYTES NFR BLD AUTO: 1 % — HIGH (ref 0–0.9)
IMM GRANULOCYTES NFR BLD AUTO: 1 % — HIGH (ref 0–0.9)
LYMPHOCYTES # BLD AUTO: 1.88 K/UL — SIGNIFICANT CHANGE UP (ref 1–3.3)
LYMPHOCYTES # BLD AUTO: 1.88 K/UL — SIGNIFICANT CHANGE UP (ref 1–3.3)
LYMPHOCYTES # BLD AUTO: 18.9 % — SIGNIFICANT CHANGE UP (ref 13–44)
LYMPHOCYTES # BLD AUTO: 18.9 % — SIGNIFICANT CHANGE UP (ref 13–44)
MCHC RBC-ENTMCNC: 27.8 PG — SIGNIFICANT CHANGE UP (ref 27–34)
MCHC RBC-ENTMCNC: 27.8 PG — SIGNIFICANT CHANGE UP (ref 27–34)
MCHC RBC-ENTMCNC: 32.6 G/DL — SIGNIFICANT CHANGE UP (ref 32–36)
MCHC RBC-ENTMCNC: 32.6 G/DL — SIGNIFICANT CHANGE UP (ref 32–36)
MCV RBC AUTO: 85.3 FL — SIGNIFICANT CHANGE UP (ref 80–100)
MCV RBC AUTO: 85.3 FL — SIGNIFICANT CHANGE UP (ref 80–100)
MONOCYTES # BLD AUTO: 0.6 K/UL — SIGNIFICANT CHANGE UP (ref 0–0.9)
MONOCYTES # BLD AUTO: 0.6 K/UL — SIGNIFICANT CHANGE UP (ref 0–0.9)
MONOCYTES NFR BLD AUTO: 6 % — SIGNIFICANT CHANGE UP (ref 2–14)
MONOCYTES NFR BLD AUTO: 6 % — SIGNIFICANT CHANGE UP (ref 2–14)
NEUTROPHILS # BLD AUTO: 7.17 K/UL — SIGNIFICANT CHANGE UP (ref 1.8–7.4)
NEUTROPHILS # BLD AUTO: 7.17 K/UL — SIGNIFICANT CHANGE UP (ref 1.8–7.4)
NEUTROPHILS NFR BLD AUTO: 71.9 % — SIGNIFICANT CHANGE UP (ref 43–77)
NEUTROPHILS NFR BLD AUTO: 71.9 % — SIGNIFICANT CHANGE UP (ref 43–77)
NRBC # BLD: 0 /100 WBCS — SIGNIFICANT CHANGE UP (ref 0–0)
NRBC # BLD: 0 /100 WBCS — SIGNIFICANT CHANGE UP (ref 0–0)
PLATELET # BLD AUTO: 412 K/UL — HIGH (ref 150–400)
PLATELET # BLD AUTO: 412 K/UL — HIGH (ref 150–400)
POTASSIUM SERPL-MCNC: 4.7 MMOL/L — SIGNIFICANT CHANGE UP (ref 3.5–5.3)
POTASSIUM SERPL-MCNC: 4.7 MMOL/L — SIGNIFICANT CHANGE UP (ref 3.5–5.3)
POTASSIUM SERPL-SCNC: 4.7 MMOL/L — SIGNIFICANT CHANGE UP (ref 3.5–5.3)
POTASSIUM SERPL-SCNC: 4.7 MMOL/L — SIGNIFICANT CHANGE UP (ref 3.5–5.3)
PROT SERPL-MCNC: 7.4 G/DL — SIGNIFICANT CHANGE UP (ref 6–8.3)
PROT SERPL-MCNC: 7.4 G/DL — SIGNIFICANT CHANGE UP (ref 6–8.3)
RBC # BLD: 3.6 M/UL — LOW (ref 3.8–5.2)
RBC # BLD: 3.6 M/UL — LOW (ref 3.8–5.2)
RBC # FLD: 13.6 % — SIGNIFICANT CHANGE UP (ref 10.3–14.5)
RBC # FLD: 13.6 % — SIGNIFICANT CHANGE UP (ref 10.3–14.5)
SODIUM SERPL-SCNC: 140 MMOL/L — SIGNIFICANT CHANGE UP (ref 135–145)
SODIUM SERPL-SCNC: 140 MMOL/L — SIGNIFICANT CHANGE UP (ref 135–145)
WBC # BLD: 9.97 K/UL — SIGNIFICANT CHANGE UP (ref 3.8–10.5)
WBC # BLD: 9.97 K/UL — SIGNIFICANT CHANGE UP (ref 3.8–10.5)
WBC # FLD AUTO: 9.97 K/UL — SIGNIFICANT CHANGE UP (ref 3.8–10.5)
WBC # FLD AUTO: 9.97 K/UL — SIGNIFICANT CHANGE UP (ref 3.8–10.5)

## 2023-12-01 PROCEDURE — 99215 OFFICE O/P EST HI 40 MIN: CPT

## 2023-12-02 PROBLEM — Z98.890 POST-OPERATIVE STATE: Status: ACTIVE | Noted: 2022-06-29

## 2023-12-02 PROBLEM — R11.2 CHEMOTHERAPY INDUCED NAUSEA AND VOMITING: Status: ACTIVE | Noted: 2022-08-05

## 2023-12-02 LAB
CANCER AG27-29 SERPL-ACNC: 9.9 U/ML — SIGNIFICANT CHANGE UP (ref 0–38.6)
CANCER AG27-29 SERPL-ACNC: 9.9 U/ML — SIGNIFICANT CHANGE UP (ref 0–38.6)

## 2023-12-05 ENCOUNTER — APPOINTMENT (OUTPATIENT)
Dept: PLASTIC SURGERY | Facility: CLINIC | Age: 33
End: 2023-12-05
Payer: COMMERCIAL

## 2023-12-05 VITALS
DIASTOLIC BLOOD PRESSURE: 78 MMHG | SYSTOLIC BLOOD PRESSURE: 110 MMHG | BODY MASS INDEX: 29.73 KG/M2 | WEIGHT: 185 LBS | TEMPERATURE: 98 F | OXYGEN SATURATION: 98 % | HEART RATE: 116 BPM | HEIGHT: 66 IN

## 2023-12-05 PROCEDURE — ZZZZZ: CPT

## 2023-12-14 ENCOUNTER — APPOINTMENT (OUTPATIENT)
Dept: PLASTIC SURGERY | Facility: CLINIC | Age: 33
End: 2023-12-14
Payer: COMMERCIAL

## 2023-12-14 VITALS
WEIGHT: 185 LBS | HEIGHT: 66 IN | DIASTOLIC BLOOD PRESSURE: 83 MMHG | SYSTOLIC BLOOD PRESSURE: 121 MMHG | TEMPERATURE: 98.6 F | OXYGEN SATURATION: 98 % | HEART RATE: 94 BPM | BODY MASS INDEX: 29.73 KG/M2

## 2023-12-14 PROCEDURE — 99024 POSTOP FOLLOW-UP VISIT: CPT

## 2023-12-14 PROCEDURE — ZZZZZ: CPT

## 2024-01-04 ENCOUNTER — APPOINTMENT (OUTPATIENT)
Dept: PLASTIC SURGERY | Facility: CLINIC | Age: 34
End: 2024-01-04
Payer: COMMERCIAL

## 2024-01-04 ENCOUNTER — APPOINTMENT (OUTPATIENT)
Dept: PLASTIC SURGERY | Facility: CLINIC | Age: 34
End: 2024-01-04

## 2024-01-04 VITALS
DIASTOLIC BLOOD PRESSURE: 94 MMHG | OXYGEN SATURATION: 98 % | HEIGHT: 66 IN | SYSTOLIC BLOOD PRESSURE: 131 MMHG | BODY MASS INDEX: 29.73 KG/M2 | WEIGHT: 185 LBS | TEMPERATURE: 97 F | HEART RATE: 89 BPM

## 2024-01-04 PROCEDURE — ZZZZZ: CPT

## 2024-01-16 ENCOUNTER — NON-APPOINTMENT (OUTPATIENT)
Age: 34
End: 2024-01-16

## 2024-01-17 ENCOUNTER — OUTPATIENT (OUTPATIENT)
Dept: OUTPATIENT SERVICES | Facility: HOSPITAL | Age: 34
LOS: 1 days | Discharge: HOME | End: 2024-01-17

## 2024-01-17 DIAGNOSIS — Z95.828 PRESENCE OF OTHER VASCULAR IMPLANTS AND GRAFTS: Chronic | ICD-10-CM

## 2024-01-17 DIAGNOSIS — Z90.722 ACQUIRED ABSENCE OF OVARIES, BILATERAL: Chronic | ICD-10-CM

## 2024-01-17 DIAGNOSIS — C50.919 MALIGNANT NEOPLASM OF UNSPECIFIED SITE OF UNSPECIFIED FEMALE BREAST: ICD-10-CM

## 2024-01-17 DIAGNOSIS — Z90.89 ACQUIRED ABSENCE OF OTHER ORGANS: Chronic | ICD-10-CM

## 2024-01-17 DIAGNOSIS — Z90.11 ACQUIRED ABSENCE OF RIGHT BREAST AND NIPPLE: Chronic | ICD-10-CM

## 2024-01-24 ENCOUNTER — APPOINTMENT (OUTPATIENT)
Dept: HEMATOLOGY ONCOLOGY | Facility: CLINIC | Age: 34
End: 2024-01-24
Payer: COMMERCIAL

## 2024-01-24 VITALS
SYSTOLIC BLOOD PRESSURE: 121 MMHG | OXYGEN SATURATION: 98 % | TEMPERATURE: 97.4 F | HEART RATE: 86 BPM | WEIGHT: 196.87 LBS | HEIGHT: 65.98 IN | BODY MASS INDEX: 31.64 KG/M2 | DIASTOLIC BLOOD PRESSURE: 85 MMHG | RESPIRATION RATE: 16 BRPM

## 2024-01-24 PROCEDURE — 99215 OFFICE O/P EST HI 40 MIN: CPT

## 2024-01-24 NOTE — REASON FOR VISIT
[Follow-Up Visit] : a follow-up [Family Member] : family member [FreeTextEntry2] :  ER+ >90%, DE+ >90%, HER 2+  Invasive poorly differentiated ductal carcinoma of the right breast

## 2024-01-24 NOTE — DISCUSSION/SUMMARY
[FreeTextEntry1] : CT scans d/w the pt RT changes as expected, no sx, will monitor CONCERN for expander rupture, she does have pain, findings communicated with plastics team, pt to f/u with Dr Siddiqui

## 2024-01-24 NOTE — ASSESSMENT
[Curative] : Goals of care discussed with patient: Curative [FreeTextEntry1] : Ms. TAMERA RUTLEDGE is a 31 year old female, diagnosed with clinically stage II (ER positive) patient ER/KS/HER2 POSITIVE right breast cancer in FIRST TRIMESTER of pregnancy. Initial consult with me at 10 weeks. Patient underwent right mastectomy with Right axillary lymph node dissection on 6/21/2022. pT3 pN3a. ER/KS/HER2/sasha POSITIVE. Adj ACTHP started 8/2/22 at 19 weeks gestation. RBCA neg. Healthy baby boy delivered at 37 week 12/9/2022. THP started 12/23/22. Started Exemestane 4/28/2023  1. Breast ca- Ms. TAMERA RUTLEDGE completed Taxol. here for Herceptin Perjeta. Started Lupron 1/2023. Started Exemestane 4/28/2023. RT completed 3/2023. BSO 6/2023 12/2023  s/p left mastectomy and revision of right reconstructed breast with removal of right tissue expander and bilateral NICOLLE flap reconstruction DOS: 11/21/23. She had BSO 6/2023, experiencing extreme hot flashes, started effexor 37.5 mg 7/2023, increased to 75mg 9/2023, helping but still have sx, rec to increase dose to 150  Tolerating HP, mild Gi sx with HP, no CP sx. Echo 8/2023, 11/2023. Last herceptin today, discussed nerlynx She is on exemestane, no arthralgias CT 9/2023 MANI Paternal GF had colon cancer patient would like to have colonoscopy will revisit scheduling colonoscopy next RPA. s/e of AI and premature menopause d/w her, referred to see cardio onc  S/E of nerlynx, dose escalation and anti diarrheal use d/w her She will start in 4 weeks.   - She is at risk for lymphedema due to lymph node dissection and radiation. She is referred to lymphedema therapy. - Chemo induced anemia- baseline 10.5 Grade 2 No transfusion needed. Monitor counts - Chemo induced diarrhea- Imodium PRN. Maintain PO hydration. BRAT diet - Chemo cardio toxicity: Cardio referral to monitor for cardiotoxicity from katerin, HP and early menopause. Echo q3m - Instructed to call office and go directly to the emergency room with fever more than 100.4, shaking chills, productive cough, sore throat, shortness of breath or urinary symptoms. Patient verbalized understanding and agreement. - Emotional support provided, all questions answered.  RTO HP q 3 wks, MD/NP q6 weeks CHEMO AND BLOOD WORK ORDERED IN [place] FOR TODAY.

## 2024-01-24 NOTE — PHYSICAL EXAM
[Fully active, able to carry on all pre-disease performance without restriction] : Status 0 - Fully active, able to carry on all pre-disease performance without restriction [Normal] : affect appropriate [de-identified] : port removed [de-identified] : BL NICOLLE flaps healing well, + drain with minimal SS output

## 2024-01-24 NOTE — HISTORY OF PRESENT ILLNESS
[de-identified] : Ms.Caitlyn Lui is a  31 year old female here for an evaluation of breast cancer. Her oncologic history is as follows:  Patient is 10 weeks and 5 days pregnant.  She had burning sensation in her breast last year and breast imaging on 11/3/2021 (BIRADS 2) showed no mammographic or sonographic evidence of malignancy. Pain went away.   She experienced right breast and axillary burning pain and noticed a right breast mass in April 2022. She underwent right breast US on 5/13/22 (BIRADS 4C) which showed a suspicious palpable mass in the right breast 9:00 and an additional suspicious mass in the right axillary tail, as well as axillary adenopathy.  She underwent right breast biopsy at 9:00 14cm FN, 11cmFn and right axilla biopsies on 5/14/2022 the right axillary tail at 9:00 14 cm FN, core biopsy showed Invasive poorly differentiated ductal carcinoma, Graymont score 8/9, measuring 1.0 cm ER+ >90%, LA+ >90%, HER 2 POSITIVE.  No Ductal carcinoma in situ, microcalcifications or lymphovascular permeation noted. The right 9:00 11 cm FN, core biopsy revealed Invasive moderately differentiated ductal carcinoma, Lorraine score 7/9 measuring 0.9 cm, ER+ >90%, LA+ >90%, HER 2 POSITIVE.  Ductal carcinoma in situ, cribriform pattern with high nuclear grade and comedonecrosis noted, No microcalcifications or lymphovascular permeation noted The right axillary lymph node core biopsy was positive for metastasis measuring 1.0 cm.  She underwent Chest Xray and.US ABDOMEN  on 05/19/2022: Chest Xray MANI 05/19/2022 ABD US No stones seen within the gallbladder. Question of a tiny polyp versus fold within the gallbladder wall, Normal liver parenchyma No focal cystic or solid masses, Normal biliary ducts.  She is vaccinated against COVID-19.  She has a 21-month-old baby at home.  Previous pregnancy was not complicated, full-term delivery.  Patient underwent right mastectomy with Right axillary lymph node dissection on 6/21/2022. Pathology from right mastectomy showed multifocal moderate to poorly differentiated ductal carcinoma with chronic inflammatory infiltrate, measuring 5.5 cm and 2.3 cm.  High-grade DCIS was removed.  10 out of 16 lymph nodes were positive for metastatic disease (largest size 2.2 cm with extranodal extension)  pT3 pN3a.  Repeat markers requested.  Addendum showed ER/LA/HER2/sasha POSITIVE on both tumors  We discussed that her disease was way much more extensive than what was felt based on imaging.  She does report that her burning axillary pain has resolved.  She has postoperative pain which is manageable.  We discussed to proceed with dose dense ACT chemo in the second and third trimester of pregnancy.  Side effects related to patient as well as fetus were reviewed in detail.  Plan to give her 1 year of Herceptin and Perjeta after delivery.  She is likely also a candidate for neratinib.  Plan for PMR T after delivery.  Given multiple positive lymph nodes, she is at high risk for recurrence.  We will do CT chest abdomen pelvis and bone scan after delivery. She is at risk for lymphedema due to lymph node dissection and radiation.  She is referred to lymphedema therapy. She is being closely followed by MFM.  She is working from home  8/22/22: C1 AC, 19 weeks  Chemo s/e discussed Medication for symptom management reviewed and questions answered 7/29/22  ECHO LVEF 58% 05/19/2022 Chest Xray Lungs clear EKG done , Chemo consent obtained  left sided Medport in place, catheter tip  is located at the cavoatrial junction. LMP 3/7/2022 Patient is a anxious about stating chemo today Emotional support given  8/17/2022 Today She is 21 weeks 1 day ANDI 12/27/22 S/p C1 Tolerated well, here for AC  #2 today 8/17/2022 Counts good   11/4/22, taxol# 6, 33+ 3 weeks , ANDI 12/27/22 Plan for induction 12/5, last chemo 11/4 today CT scan, bone scan after delivery  THP starts 12/23 Plan discussed with MFM, OB, NICU in MDT meeting 11/3/22  She reports mild-moderate fatigue and altered taste after chemo. She was able to function, maintained PO intake, weight stable. She had mild nausea, took Reglan, no vomiting, no diarrhea or mouth sores. She had mild Bone pain from neulasta. No neuropathy, no fevers Baby moving well, monitored by MFM regularly . Normal fetal EKG and sono, no fetal wt issues Seen by rad onc 9/2022 BS and BP ok  12/23/22 Here for Taxol #7 and to start HP q 3 weeks x 17 doses. She delivered a healthy baby boy 12/9/22 6lbs 10 oz Energy and appetite back to baseline, occasional mild fatigue hair started growing back, no neuropathy  12/9/2022 CT CAP No evidence of metastatic disease in the thorax, abdomen, and pelvis. Bone scan ordered pending auth for scheduling  1/6/2023 Here for Taxol #9 Tolerating well she reports GI upset,and mild diarrhea 1-2 episodes occasionally. Imodium prn. Seen with Dr. Jason to discuss tx plan after completion of RT. Discussed use of tamoxifen vs AI + lupron, benefits of AI+Lupron, side effects , use of contraception and need to avoid pregnancy. Discussed that we would start Lupron for 6 months then consider BSO. Discussed the cons of early menopause such as increased risk of heart disease , osteoporosis and hyperlipidemia. We discussed the possibility of BL mastectomies as she is leaning toward mastectomy.  1/6/2023 Bone scan: No scan evidence of osseous metastasis. Will repeat SCANS at the completion of HP  1/120/23 Taxol HP # 11 She reports mild-moderate fatigue and altered taste x 3-4 days after chemo. She was able to function, maintained PO intake, weight stable. She had mild nausea, took Reglan, no vomiting,  mouth sores. No Bone pain, no neuropathy, no fevers. She had diarrhea day 5 x 1.5 days. Controlled with imodium   Pt will get RT after last taxol. We discussed use of tamoxifen vs AI + lupron, benefits of AI+Lupron, side effects , use of contraception and need to avoid pregnancy. Patient is young and with high risk disease. She is candidate for OS + AI based on SOFT/TEXT data. Discussed that we would start Lupron with last chemo, would do atleast for 6 months of lupron then consider BSO. Discussed the cons of early menopause such increased risk of heart disease , osteoporosis and hyperlipidemia.  We discussed the possibility of BL mastectomies as she is leaning toward mastectomies.  She is also a candidate for neratinib after HP due to high risk disease.  We will discuss this again per her request at next visit, she will bring spouse for discussion   CT CAP BONE scan MANI 12/2022. Will repeat SCANS at the completion of HP  Baby is doing well    2/2023 Chemo side effects are almost completely resolved. No neuropathy. Hair is growing back.  Appetite and energy levels are good.  She reports feeling anxious about her future, not feeling herself and worried about menopausal side effects.  She started seeing a therapist.referred by OB/GYNwhich she preferred. She is currently f/b psychologist for feelings of being overwhelmed.  She does not think that she is depressed and does not want to start antidepressant.  We will continue to evaluate and make a referral to see psychiatry as needed. Baby Gume is 10 weeks old, has developed allergy to formula.  She changed to amino acid formula and he is adjusting to it in terms of bowel habits.  She is overwhelmed. She had mild diarrhea with Herceptin Perjeta, recommend to use Imodium. She started postmastectomy radiation 3/6/23 tentative completion date 3/31/23. Endocrine therapy has been discussed with her.  She is a candidate for ovarian suppression plus AI.  She started Lupron 2/3/2023.  She will be monitored for toxicity.  We will start AI after radiation. RTO 4 weeks to monitor Lupron toxicity and mental health/postpartum issues.  Cardio referral to monitor for cardiotoxicity from katerin, HP and early menopause at a later time  6/9/2023 She has completed Taxol.  Herceptin Perjeta No 9  today.  Started Lupron 1/2023 tolerating well.  She reports moderate vaginal bleeding x 4 days 6/2-6/5/23 had to wear a pad.  Ovarian suppression not effective, Dr. Jason discussed scheduling BSO  ASAP  2/2 high risk disease. She has 2 young children girl and boy  Also discussed stopping AI at this time until BSO complete. Discussed  effects of early menopause Will refer to Dr. Minda Machado. Started Exemestane 4/28/2023. She developed painful rash/papules to face 2 weeks after starting AI. denies itching or sob.  6/23/23 She is having off/on diarrhea x 1 week taking imodium with some relief no abd pain, fever or vomiting No sick contact  Likely HP related She has very high risk disease with multiple positive nodes, therefore will not drop Perjeta aggressively manage diarrhea with IV hydration, electrolyte supplementation, imodium and lomotil d/w her Pt and spouse in agreement  9/2023 She had BSO 6/2023, experiencing extreme hot flashes, started effexor 37.5 mg 7/2023, helping but still have sx, rec to increase dose to 75 Tolerating HP, mild Gi sx with HP, no CP sx. Echo 8/2023 She is on exemestane, no arthralgias She has sternal/right chest pain. She has high risk disease. Will do CT scans to evaluate Paternal GF had colon cancer patient would like to have colonoscopy will revisit scheduling colonoscopy next RPA.  [de-identified] : Ms. TAMERA RUTLEDGE is a  31 year old female, diagnosed with clinically stage II (ER positive) patient ER/CO/HER2 POSITIVE right breast cancer in FIRST TRIMESTER of pregnancy.  Initial consult with me at 10 weeks.  Patient underwent right mastectomy with Right axillary lymph node dissection on 6/21/2022. pT3 pN3a. ER/CO/HER2/sasha POSITIVE. Adj ACTHP started 8/2/22 at 19 weeks gestation. Genetics : BRCA NEGATIVE . Healthy baby boy delivered at 37 week 12/2022. THP started 12/23/22. RT completed 3/2023. AI started 4/2023. BSO 6/2023  here with parents spouse  LN  prognosis  qol -  stopped  2 days aurelio 3pills   12/2023  s/p left mastectomy and revision of right reconstructed breast with removal of right tissue expander and bilateral NICOLLE flap reconstruction DOS: 11/21/23. She had BSO 6/2023, experiencing extreme hot flashes, started effexor 37.5 mg 7/2023, increased to 75mg 9/2023, helping but still have sx, rec to increase dose to 150  Tolerating HP, mild Gi sx with HP, no CP sx. Echo 8/2023, 11/2023. Last herceptin today, discussed nerlynx She is on exemestane, no arthralgias CT 9/2023 MANI Paternal GF had colon cancer patient would like to have colonoscopy will revisit scheduling colonoscopy next RPA. s/e of AI and premature menopause d/w her, referred to see cardio onc

## 2024-02-13 NOTE — ASSESSMENT
[FreeTextEntry1] : 33 year old female who presents for a post op visit s/p left mastectomy and revision of right reconstructed breast with removal of right tissue expander and bilateral NICOLLE flap reconstruction DOS: 11/21/23. Incisions are all healing well.   PLAN: supportive bra and abdominal binder massage incision sites with Vitamin E and cocoa butter to decrease scar formation. F/u in

## 2024-02-13 NOTE — HISTORY OF PRESENT ILLNESS
[FreeTextEntry1] : Ms. Lui is a 33-year-old female who presents for a follow-up visit s/p left mastectomy and revision of right reconstructed breast with the removal of a right tissue expander and bilateral NICOLLE flap reconstruction DOS: 11/21/23. She is previously s/p right breast reconstruction with placement of tissue expander DOS: 06/21/22.  Abdominal binder ? supportive bra ?  pain ?

## 2024-02-14 ENCOUNTER — RESULT REVIEW (OUTPATIENT)
Age: 34
End: 2024-02-14

## 2024-02-14 ENCOUNTER — APPOINTMENT (OUTPATIENT)
Dept: HEMATOLOGY ONCOLOGY | Facility: CLINIC | Age: 34
End: 2024-02-14
Payer: COMMERCIAL

## 2024-02-14 VITALS
SYSTOLIC BLOOD PRESSURE: 111 MMHG | TEMPERATURE: 97 F | HEART RATE: 95 BPM | OXYGEN SATURATION: 98 % | BODY MASS INDEX: 31.68 KG/M2 | RESPIRATION RATE: 16 BRPM | DIASTOLIC BLOOD PRESSURE: 80 MMHG | WEIGHT: 196.21 LBS

## 2024-02-14 DIAGNOSIS — Z79.899 OTHER LONG TERM (CURRENT) DRUG THERAPY: ICD-10-CM

## 2024-02-14 LAB
BASOPHILS # BLD AUTO: 0.03 K/UL — SIGNIFICANT CHANGE UP (ref 0–0.2)
BASOPHILS NFR BLD AUTO: 0.3 % — SIGNIFICANT CHANGE UP (ref 0–2)
EOSINOPHIL # BLD AUTO: 0.18 K/UL — SIGNIFICANT CHANGE UP (ref 0–0.5)
EOSINOPHIL NFR BLD AUTO: 2 % — SIGNIFICANT CHANGE UP (ref 0–6)
HCT VFR BLD CALC: 41.3 % — SIGNIFICANT CHANGE UP (ref 34.5–45)
HGB BLD-MCNC: 12.8 G/DL — SIGNIFICANT CHANGE UP (ref 11.5–15.5)
IMM GRANULOCYTES NFR BLD AUTO: 0.2 % — SIGNIFICANT CHANGE UP (ref 0–0.9)
LYMPHOCYTES # BLD AUTO: 2.55 K/UL — SIGNIFICANT CHANGE UP (ref 1–3.3)
LYMPHOCYTES # BLD AUTO: 28.5 % — SIGNIFICANT CHANGE UP (ref 13–44)
MCHC RBC-ENTMCNC: 25.1 PG — LOW (ref 27–34)
MCHC RBC-ENTMCNC: 31 G/DL — LOW (ref 32–36)
MCV RBC AUTO: 81 FL — SIGNIFICANT CHANGE UP (ref 80–100)
MONOCYTES # BLD AUTO: 0.63 K/UL — SIGNIFICANT CHANGE UP (ref 0–0.9)
MONOCYTES NFR BLD AUTO: 7 % — SIGNIFICANT CHANGE UP (ref 2–14)
NEUTROPHILS # BLD AUTO: 5.53 K/UL — SIGNIFICANT CHANGE UP (ref 1.8–7.4)
NEUTROPHILS NFR BLD AUTO: 62 % — SIGNIFICANT CHANGE UP (ref 43–77)
NRBC # BLD: 0 /100 WBCS — SIGNIFICANT CHANGE UP (ref 0–0)
PLATELET # BLD AUTO: 382 K/UL — SIGNIFICANT CHANGE UP (ref 150–400)
RBC # BLD: 5.1 M/UL — SIGNIFICANT CHANGE UP (ref 3.8–5.2)
RBC # FLD: 14.6 % — HIGH (ref 10.3–14.5)
WBC # BLD: 8.94 K/UL — SIGNIFICANT CHANGE UP (ref 3.8–10.5)
WBC # FLD AUTO: 8.94 K/UL — SIGNIFICANT CHANGE UP (ref 3.8–10.5)

## 2024-02-14 PROCEDURE — 99214 OFFICE O/P EST MOD 30 MIN: CPT

## 2024-02-14 NOTE — ASSESSMENT
[Curative] : Goals of care discussed with patient: Curative [FreeTextEntry1] : Ms. TAMERA RUTLEDGE is a 31 year old female, diagnosed with clinically stage II (ER positive) patient ER/WI/HER2 POSITIVE right breast cancer in FIRST TRIMESTER of pregnancy. Initial consult with me at 10 weeks. Patient underwent right mastectomy with Right axillary lymph node dissection on 6/21/2022. pT3 pN3a. ER/WI/HER2/sasha POSITIVE. Adj ACTHP started 8/2/22 at 19 weeks gestation. RBCA neg. Healthy baby boy delivered at 37 week 12/9/2022. THP started 12/23/22. Started Exemestane 4/28/2023  1. Breast ca- Ms. TAMERA RUTLEDGE completed Taxol. here for Herceptin Perjeta. Started Lupron 1/2023. Started Exemestane 4/28/2023. RT completed 3/2023. BSO 6/2023. HP completed 12/2023. Nerlynx started 1/2024 2/2024 SHe is on nerlynx 3 pills a day  Still getting loose BM 8-10 days, despite taking 6-8 imodium a day  Wasn't able to fill lomotil , resent lomotil to vivo Hydrating well orally, rec to add gatorade. Doesnt want IV fluids cut down neralynx to 2 pills a day given significant diarrhea.  If she continues to have significant GI toxicity with 2 pills, would probably stop  If diarrhea improves with addition of lomotil, consider increasing to 3 pills again BL MRM: no M/S needed She had BSO 6/2023, experiencing extreme hot flashes, started effexor 37.5 mg 7/2023, increased to 75mg 9/2023, helping but still have sx, now on 150  She is on exemestane, no arthralgias CT 9/2023 MANI Paternal GF had colon cancer patient would like to have colonoscopy will revisit scheduling colonoscopy next RPA. s/e of AI and premature menopause d/w her, referred to see cardio onc  - She is at risk for lymphedema due to lymph node dissection and radiation. She is referred to lymphedema therapy. - Chemo induced diarrhea- Imodium PRN. Maintain PO hydration. BRAT diet - Chemo cardio toxicity: Cardio referral to monitor for cardiotoxicity from katerin, HP and early menopause. Echo q3m - Instructed to call office and go directly to the emergency room with fever more than 100.4, shaking chills, productive cough, sore throat, shortness of breath or urinary symptoms. Patient verbalized understanding and agreement. - Emotional support provided, all questions answered.  RTO 1 m

## 2024-02-14 NOTE — PHYSICAL EXAM
[Fully active, able to carry on all pre-disease performance without restriction] : Status 0 - Fully active, able to carry on all pre-disease performance without restriction [Normal] : affect appropriate [de-identified] : port removed [de-identified] : BL NICOLLE flaps healied well

## 2024-02-14 NOTE — HISTORY OF PRESENT ILLNESS
[de-identified] : Ms.Caitlyn Lui is a  31 year old female here for an evaluation of breast cancer. Her oncologic history is as follows:  Patient is 10 weeks and 5 days pregnant.  She had burning sensation in her breast last year and breast imaging on 11/3/2021 (BIRADS 2) showed no mammographic or sonographic evidence of malignancy. Pain went away.   She experienced right breast and axillary burning pain and noticed a right breast mass in April 2022. She underwent right breast US on 5/13/22 (BIRADS 4C) which showed a suspicious palpable mass in the right breast 9:00 and an additional suspicious mass in the right axillary tail, as well as axillary adenopathy.  She underwent right breast biopsy at 9:00 14cm FN, 11cmFn and right axilla biopsies on 5/14/2022 the right axillary tail at 9:00 14 cm FN, core biopsy showed Invasive poorly differentiated ductal carcinoma, Barnard score 8/9, measuring 1.0 cm ER+ >90%, AK+ >90%, HER 2 POSITIVE.  No Ductal carcinoma in situ, microcalcifications or lymphovascular permeation noted. The right 9:00 11 cm FN, core biopsy revealed Invasive moderately differentiated ductal carcinoma, Lorraine score 7/9 measuring 0.9 cm, ER+ >90%, AK+ >90%, HER 2 POSITIVE.  Ductal carcinoma in situ, cribriform pattern with high nuclear grade and comedonecrosis noted, No microcalcifications or lymphovascular permeation noted The right axillary lymph node core biopsy was positive for metastasis measuring 1.0 cm.  She underwent Chest Xray and.US ABDOMEN  on 05/19/2022: Chest Xray MANI 05/19/2022 ABD US No stones seen within the gallbladder. Question of a tiny polyp versus fold within the gallbladder wall, Normal liver parenchyma No focal cystic or solid masses, Normal biliary ducts.  She is vaccinated against COVID-19.  She has a 21-month-old baby at home.  Previous pregnancy was not complicated, full-term delivery.  Patient underwent right mastectomy with Right axillary lymph node dissection on 6/21/2022. Pathology from right mastectomy showed multifocal moderate to poorly differentiated ductal carcinoma with chronic inflammatory infiltrate, measuring 5.5 cm and 2.3 cm.  High-grade DCIS was removed.  10 out of 16 lymph nodes were positive for metastatic disease (largest size 2.2 cm with extranodal extension)  pT3 pN3a.  Repeat markers requested.  Addendum showed ER/AK/HER2/sasha POSITIVE on both tumors  We discussed that her disease was way much more extensive than what was felt based on imaging.  She does report that her burning axillary pain has resolved.  She has postoperative pain which is manageable.  We discussed to proceed with dose dense ACT chemo in the second and third trimester of pregnancy.  Side effects related to patient as well as fetus were reviewed in detail.  Plan to give her 1 year of Herceptin and Perjeta after delivery.  She is likely also a candidate for neratinib.  Plan for PMR T after delivery.  Given multiple positive lymph nodes, she is at high risk for recurrence.  We will do CT chest abdomen pelvis and bone scan after delivery. She is at risk for lymphedema due to lymph node dissection and radiation.  She is referred to lymphedema therapy. She is being closely followed by MFM.  She is working from home  8/22/22: C1 AC, 19 weeks  Chemo s/e discussed Medication for symptom management reviewed and questions answered 7/29/22  ECHO LVEF 58% 05/19/2022 Chest Xray Lungs clear EKG done , Chemo consent obtained  left sided Medport in place, catheter tip  is located at the cavoatrial junction. LMP 3/7/2022 Patient is a anxious about stating chemo today Emotional support given  8/17/2022 Today She is 21 weeks 1 day ANDI 12/27/22 S/p C1 Tolerated well, here for AC  #2 today 8/17/2022 Counts good   11/4/22, taxol# 6, 33+ 3 weeks , ANDI 12/27/22 Plan for induction 12/5, last chemo 11/4 today CT scan, bone scan after delivery  THP starts 12/23 Plan discussed with MFM, OB, NICU in MDT meeting 11/3/22  She reports mild-moderate fatigue and altered taste after chemo. She was able to function, maintained PO intake, weight stable. She had mild nausea, took Reglan, no vomiting, no diarrhea or mouth sores. She had mild Bone pain from neulasta. No neuropathy, no fevers Baby moving well, monitored by MFM regularly . Normal fetal EKG and sono, no fetal wt issues Seen by rad onc 9/2022 BS and BP ok  12/23/22 Here for Taxol #7 and to start HP q 3 weeks x 17 doses. She delivered a healthy baby boy 12/9/22 6lbs 10 oz Energy and appetite back to baseline, occasional mild fatigue hair started growing back, no neuropathy  12/9/2022 CT CAP No evidence of metastatic disease in the thorax, abdomen, and pelvis. Bone scan ordered pending auth for scheduling  1/6/2023 Here for Taxol #9 Tolerating well she reports GI upset,and mild diarrhea 1-2 episodes occasionally. Imodium prn. Seen with Dr. Jason to discuss tx plan after completion of RT. Discussed use of tamoxifen vs AI + lupron, benefits of AI+Lupron, side effects , use of contraception and need to avoid pregnancy. Discussed that we would start Lupron for 6 months then consider BSO. Discussed the cons of early menopause such as increased risk of heart disease , osteoporosis and hyperlipidemia. We discussed the possibility of BL mastectomies as she is leaning toward mastectomy.  1/6/2023 Bone scan: No scan evidence of osseous metastasis. Will repeat SCANS at the completion of HP  1/120/23 Taxol HP # 11 She reports mild-moderate fatigue and altered taste x 3-4 days after chemo. She was able to function, maintained PO intake, weight stable. She had mild nausea, took Reglan, no vomiting,  mouth sores. No Bone pain, no neuropathy, no fevers. She had diarrhea day 5 x 1.5 days. Controlled with imodium   Pt will get RT after last taxol. We discussed use of tamoxifen vs AI + lupron, benefits of AI+Lupron, side effects , use of contraception and need to avoid pregnancy. Patient is young and with high risk disease. She is candidate for OS + AI based on SOFT/TEXT data. Discussed that we would start Lupron with last chemo, would do atleast for 6 months of lupron then consider BSO. Discussed the cons of early menopause such increased risk of heart disease , osteoporosis and hyperlipidemia.  We discussed the possibility of BL mastectomies as she is leaning toward mastectomies.  She is also a candidate for neratinib after HP due to high risk disease.  We will discuss this again per her request at next visit, she will bring spouse for discussion   CT CAP BONE scan MANI 12/2022. Will repeat SCANS at the completion of HP  Baby is doing well    2/2023 Chemo side effects are almost completely resolved. No neuropathy. Hair is growing back.  Appetite and energy levels are good.  She reports feeling anxious about her future, not feeling herself and worried about menopausal side effects.  She started seeing a therapist.referred by OB/GYNwhich she preferred. She is currently f/b psychologist for feelings of being overwhelmed.  She does not think that she is depressed and does not want to start antidepressant.  We will continue to evaluate and make a referral to see psychiatry as needed. Baby Gume is 10 weeks old, has developed allergy to formula.  She changed to amino acid formula and he is adjusting to it in terms of bowel habits.  She is overwhelmed. She had mild diarrhea with Herceptin Perjeta, recommend to use Imodium. She started postmastectomy radiation 3/6/23 tentative completion date 3/31/23. Endocrine therapy has been discussed with her.  She is a candidate for ovarian suppression plus AI.  She started Lupron 2/3/2023.  She will be monitored for toxicity.  We will start AI after radiation. RTO 4 weeks to monitor Lupron toxicity and mental health/postpartum issues.  Cardio referral to monitor for cardiotoxicity from katerin, HP and early menopause at a later time  6/9/2023 She has completed Taxol.  Herceptin Perjeta No 9  today.  Started Lupron 1/2023 tolerating well.  She reports moderate vaginal bleeding x 4 days 6/2-6/5/23 had to wear a pad.  Ovarian suppression not effective, Dr. Jason discussed scheduling BSO  ASAP  2/2 high risk disease. She has 2 young children girl and boy  Also discussed stopping AI at this time until BSO complete. Discussed  effects of early menopause Will refer to Dr. Minda Machado. Started Exemestane 4/28/2023. She developed painful rash/papules to face 2 weeks after starting AI. denies itching or sob.  6/23/23 She is having off/on diarrhea x 1 week taking imodium with some relief no abd pain, fever or vomiting No sick contact  Likely HP related She has very high risk disease with multiple positive nodes, therefore will not drop Perjeta aggressively manage diarrhea with IV hydration, electrolyte supplementation, imodium and lomotil d/w her Pt and spouse in agreement  9/2023 She had BSO 6/2023, experiencing extreme hot flashes, started effexor 37.5 mg 7/2023, helping but still have sx, rec to increase dose to 75 Tolerating HP, mild Gi sx with HP, no CP sx. Echo 8/2023 She is on exemestane, no arthralgias She has sternal/right chest pain. She has high risk disease. Will do CT scans to evaluate Paternal GF had colon cancer patient would like to have colonoscopy will revisit scheduling colonoscopy next RPA.    12/2023  s/p left mastectomy and revision of right reconstructed breast with removal of right tissue expander and bilateral NICOLLE flap reconstruction DOS: 11/21/23. She had BSO 6/2023, experiencing extreme hot flashes, started effexor 37.5 mg 7/2023, increased to 75mg 9/2023, helping but still have sx, rec to increase dose to 150  Tolerating HP, mild Gi sx with HP, no CP sx. Echo 8/2023, 11/2023. Last herceptin today, discussed nerlynx She is on exemestane, no arthralgias CT 9/2023 MANI Paternal GF had colon cancer patient would like to have colonoscopy will revisit scheduling colonoscopy next RPA. s/e of AI and premature menopause d/w her, referred to see cardio onc  S/E of nerlynx, dose escalation and anti diarrheal use d/w her She will start in 4 weeks.   1/24/2024 She is here with parents and spouse to discuss prognosis and risk of recurrence She started nerlynx 3 weeks ago, did well with 3 pills x 1 week, developed sevre diarrhea with cramping with 4 pills. Stopped last week and sx resolved.  She has questions about risk of recurrence and benefit from nerlynx.  We reviewed path - T3N3 ER/AK/HER+ High risk of recurrence due to large tumor size andd multiple nodes d/w them Better prognosis related to HER2 positivity reviewed EXTENET trial and benefit from neratinib for node + disease reviewed Given toxicity, rec to restart 3 pills a day. Use imodium, add lomotil Continue same dose for 3 weeks. If tolerated, would consider to go up to 4 pills a day  Mother was emotionally upset, she was reassured  [de-identified] : Ms. TAMERA RUTLEDGE is a  31 year old female, diagnosed with clinically stage II (ER positive) patient ER/CA/HER2 POSITIVE right breast cancer in FIRST TRIMESTER of pregnancy.  Initial consult with me at 10 weeks.  Patient underwent right mastectomy with Right axillary lymph node dissection on 6/21/2022. pT3 pN3a. ER/CA/HER2/sasha POSITIVE. Adj ACTHP started 8/2/22 at 19 weeks gestation. Genetics : BRCA NEGATIVE . Healthy baby boy delivered at 37 week 12/2022. THP started 12/23/22. RT completed 3/2023. AI started 4/2023. BSO 6/2023. HP completed 12/2023. Nerlynx started 1/2024 2/2024 SHe is on nerlynx 3 pills a day  Still getting loose BM 8-10 days, despite taking 6-8 imodium a day  Wasn't able to fill lomotil , resent lomotil to vivo cut down neralynx to 2 pills a day given significant diarrhea.  If she continues to have significant GI toxicity with 2 pills, would probably stop  If diarrhea improves with addition of lomotil, consider increasing to 3 pills again BL MRM: no M/S needed She had BSO 6/2023, experiencing extreme hot flashes, started effexor 37.5 mg 7/2023, increased to 75mg 9/2023, helping but still have sx, now on 150  She is on exemestane, no arthralgias CT 9/2023 MANI Paternal GF had colon cancer patient would like to have colonoscopy will revisit scheduling colonoscopy next RPA. s/e of AI and premature menopause d/w her, referred to see cardio onc

## 2024-02-15 ENCOUNTER — APPOINTMENT (OUTPATIENT)
Dept: PLASTIC SURGERY | Facility: CLINIC | Age: 34
End: 2024-02-15
Payer: COMMERCIAL

## 2024-02-15 VITALS
DIASTOLIC BLOOD PRESSURE: 87 MMHG | HEIGHT: 66 IN | HEART RATE: 116 BPM | TEMPERATURE: 98 F | SYSTOLIC BLOOD PRESSURE: 116 MMHG | OXYGEN SATURATION: 99 % | BODY MASS INDEX: 31.5 KG/M2 | WEIGHT: 196 LBS

## 2024-02-15 LAB
ALBUMIN SERPL ELPH-MCNC: 5.2 G/DL
ALP BLD-CCNC: 113 U/L
ALT SERPL-CCNC: 36 U/L
ANION GAP SERPL CALC-SCNC: 14 MMOL/L
AST SERPL-CCNC: 43 U/L
BILIRUB SERPL-MCNC: 0.4 MG/DL
BUN SERPL-MCNC: 24 MG/DL
CALCIUM SERPL-MCNC: 10 MG/DL
CEA SERPL-MCNC: 0.8 NG/ML
CHLORIDE SERPL-SCNC: 104 MMOL/L
CO2 SERPL-SCNC: 22 MMOL/L
CREAT SERPL-MCNC: 0.87 MG/DL
EGFR: 89 ML/MIN/1.73M2
GLUCOSE SERPL-MCNC: 94 MG/DL
POTASSIUM SERPL-SCNC: 3.8 MMOL/L
PROT SERPL-MCNC: 8.2 G/DL
SODIUM SERPL-SCNC: 140 MMOL/L

## 2024-02-15 PROCEDURE — ZZZZZ: CPT

## 2024-02-24 ENCOUNTER — APPOINTMENT (OUTPATIENT)
Dept: PLASTIC SURGERY | Facility: CLINIC | Age: 34
End: 2024-02-24

## 2024-02-24 PROCEDURE — XXXXX: CPT | Mod: 1L

## 2024-02-27 LAB — CANCER AG27-29 SERPL-ACNC: 12.2 U/ML

## 2024-03-01 ENCOUNTER — APPOINTMENT (OUTPATIENT)
Dept: CARDIOLOGY | Facility: CLINIC | Age: 34
End: 2024-03-01
Payer: COMMERCIAL

## 2024-03-01 PROCEDURE — 93306 TTE W/DOPPLER COMPLETE: CPT

## 2024-03-01 PROCEDURE — 93356 MYOCRD STRAIN IMG SPCKL TRCK: CPT

## 2024-03-04 ENCOUNTER — OUTPATIENT (OUTPATIENT)
Dept: OUTPATIENT SERVICES | Facility: HOSPITAL | Age: 34
LOS: 1 days | End: 2024-03-04
Payer: COMMERCIAL

## 2024-03-04 VITALS
WEIGHT: 194.01 LBS | OXYGEN SATURATION: 97 % | TEMPERATURE: 98 F | HEART RATE: 73 BPM | DIASTOLIC BLOOD PRESSURE: 84 MMHG | HEIGHT: 66 IN | SYSTOLIC BLOOD PRESSURE: 126 MMHG | RESPIRATION RATE: 18 BRPM

## 2024-03-04 DIAGNOSIS — C50.911 MALIGNANT NEOPLASM OF UNSPECIFIED SITE OF RIGHT FEMALE BREAST: ICD-10-CM

## 2024-03-04 DIAGNOSIS — Z98.890 OTHER SPECIFIED POSTPROCEDURAL STATES: Chronic | ICD-10-CM

## 2024-03-04 DIAGNOSIS — N65.0 DEFORMITY OF RECONSTRUCTED BREAST: ICD-10-CM

## 2024-03-04 DIAGNOSIS — Z90.89 ACQUIRED ABSENCE OF OTHER ORGANS: Chronic | ICD-10-CM

## 2024-03-04 DIAGNOSIS — Z01.818 ENCOUNTER FOR OTHER PREPROCEDURAL EXAMINATION: ICD-10-CM

## 2024-03-04 DIAGNOSIS — Z85.3 PERSONAL HISTORY OF MALIGNANT NEOPLASM OF BREAST: ICD-10-CM

## 2024-03-04 DIAGNOSIS — Z95.828 PRESENCE OF OTHER VASCULAR IMPLANTS AND GRAFTS: Chronic | ICD-10-CM

## 2024-03-04 DIAGNOSIS — Z90.11 ACQUIRED ABSENCE OF RIGHT BREAST AND NIPPLE: Chronic | ICD-10-CM

## 2024-03-04 DIAGNOSIS — Z90.722 ACQUIRED ABSENCE OF OVARIES, BILATERAL: Chronic | ICD-10-CM

## 2024-03-04 PROCEDURE — G0463: CPT

## 2024-03-04 PROCEDURE — 36415 COLL VENOUS BLD VENIPUNCTURE: CPT

## 2024-03-04 PROCEDURE — 80048 BASIC METABOLIC PNL TOTAL CA: CPT

## 2024-03-04 NOTE — H&P PST ADULT - PROBLEM SELECTOR PLAN 2
continue medications as prescribed.   as per discussion with pt, will ask heme/onc if any hormonal therapy must be stopped prior to DOS

## 2024-03-04 NOTE — H&P PST ADULT - NSICDXPASTMEDICALHX_GEN_ALL_CORE_FT
PAST MEDICAL HISTORY:  Breast cancer, right     History of chemotherapy     Malignant neoplasm of unspecified site of right female breast      (normal spontaneous vaginal delivery)     S/P radiation therapy

## 2024-03-04 NOTE — H&P PST ADULT - NEGATIVE BREAST SYMPTOMS
no breast tenderness L/no breast lump L/no nipple discharge L/no nipple discharge R no nipple discharge L/no nipple discharge R

## 2024-03-04 NOTE — H&P PST ADULT - NEUROLOGICAL
negative normal/cranial nerves II-XII intact/sensation intact sensation intact/responds to pain/responds to verbal commands/no spontaneous movement

## 2024-03-04 NOTE — H&P PST ADULT - PAIN GOAL
[facility]PART[facility] ENCOUNTER    Basic Information  Patient: Ady Kendall Age: 5 year old Sex: female  MRN: [id number] Encounter Date: 5/5/2022, 9:30 AM  PCP: Verify Pcp        Chief Complaint  Chief Complaint   Patient presents with   • Abdominal Pain       History of Present Illness  Patient is a 5-year-old female with no prior medical history who presented complaining of abdominal pain along with nausea.  Pain began yesterday in the central and lower abdomen and worsened today along with the nausea.  No vomiting.  No diarrhea or constipation.  No fevers or chills.  No discomfort with urination.  No sore throat or runny nose.  No cough or congestion.        I have reviewed the non physician practitioners documentation, personally taken the patient's history, performed an exam and agree with the physical findings, diagnosis and management plan.      Orders  Medications   iohexol ORAL (OMNIPAQUE 350) oral contrast solution 12.5 mL (12.5 mLs Oral Given 5/5/22 0946)   ondansetron (ZOFRAN) injection 2 mg (2 mg Intravenous Given 5/5/22 0945)   sodium chloride (NORMAL SALINE) 0.9 % bolus 500 mL (0 mLs Intravenous Completed 5/5/22 1016)         Laboratory and Radiology Results    Results for orders placed or performed during the hospital encounter of 05/05/22   Comprehensive Metabolic Panel   Result Value Ref Range    Fasting Status      Sodium 139 135 - 145 mmol/L    Potassium 4.0 3.4 - 5.1 mmol/L    Chloride 107 98 - 107 mmol/L    Carbon Dioxide 24 21 - 32 mmol/L    Anion Gap 12 10 - 20 mmol/L    Glucose 93 70 - 99 mg/dL    BUN 20 (H) 5 - 18 mg/dL    Creatinine 0.44 0.21 - 0.65 mg/dL    Glomerular Filtration Rate      BUN/ Creatinine Ratio 45 (H) 7 - 25    Calcium 10.3 8.0 - 11.0 mg/dL    Bilirubin, Total 0.3 0.2 - 1.4 mg/dL    GOT/AST 24 10 - 55 Units/L    GPT/ALT 25 10 - 30 Units/L    Alkaline Phosphatase 230 150 - 360 Units/L    Albumin 4.3 3.6 - 5.1 g/dL    Protein, Total 7.8 6.0 - 8.0 g/dL     Globulin 3.5 2.0 - 4.0 g/dL    A/G Ratio 1.2 1.0 - 2.4   Lipase   Result Value Ref Range    Lipase 101 73 - 393 Units/L   SARS-COV-2/INFLUENZA BY PCR   Result Value Ref Range    Rapid SARS-COV-2 by PCR Not Detected Not Detected / Detected / Presumptive Positive / Inhibitors present    Influenza A by PCR Not Detected Not Detected    Influenza B by PCR Not Detected Not Detected    Isolation Guidelines      Procedural Comment     Urinalysis With Microscopy Exam W/O C/S   Result Value Ref Range    COLOR, URINALYSIS Yellow     APPEARANCE, URINALYSIS Hazy     GLUCOSE, URINALYSIS Negative Negative mg/dL    BILIRUBIN, URINALYSIS Negative Negative    KETONES, URINALYSIS Trace (A) Negative mg/dL    SPECIFIC GRAVITY, URINALYSIS 1.028 1.005 - 1.030    OCCULT BLOOD, URINALYSIS Negative Negative    PH, URINALYSIS 6.0 5.0 - 7.0    PROTEIN, URINALYSIS Negative Negative mg/dL    UROBILINOGEN, URINALYSIS 0.2 0.2, 1.0 mg/dL    NITRITE, URINALYSIS Negative Negative    LEUKOCYTE ESTERASE, URINALYSIS Large (A) Negative    SQUAMOUS EPITHELIAL, URINALYSIS 1 to 5 None Seen, 1 to 5 /hpf    ERYTHROCYTES, URINALYSIS 11 to 25 (A) None Seen, 1 to 2 /hpf    LEUKOCYTES, URINALYSIS 26 to 100 (A) None Seen, 1 to 5 /hpf    BACTERIA, URINALYSIS None Seen None Seen /hpf    HYALINE CASTS, URINALYSIS None Seen None Seen, 1 to 5 /lpf   CBC with Automated Differential (performable only)   Result Value Ref Range    WBC 12.5 5.0 - 14.5 K/mcL    RBC 5.36 (H) 3.90 - 5.30 mil/mcL    HGB 14.4 11.5 - 15.5 g/dL    HCT 44.0 35.0 - 45.0 %    MCV 82.1 77.0 - 95.0 fl    MCH 26.9 25.0 - 33.0 pg    MCHC 32.7 31.0 - 37.0 g/dL    RDW-CV 12.6 11.0 - 15.0 %    RDW-SD 37.6 35.0 - 47.0 fL     140 - 450 K/mcL    NRBC 0 <=0 /100 WBC    Neutrophil, Percent 61 %    Lymphocytes, Percent 27 %    Mono, Percent 5 %    Eosinophils, Percent 6 %    Basophils, Percent 1 %    Immature Granulocytes 0 %    Absolute Neutrophils 7.7 1.5 - 8.0 K/mcL    Absolute Lymphocytes 3.4 1.5 - 7.0  K/mcL    Absolute Monocytes 0.6 0.0 - 0.8 K/mcL    Absolute Eosinophils  0.7 (H) 0.0 - 0.5 K/mcL    Absolute Basophils 0.1 0.0 - 0.2 K/mcL    Absolute Immmature Granulocytes 0.0 0.0 - 0.2 K/mcL       CT ABDOMEN PELVIS WO CONTRAST   Final Result   1. No evidence of appendicitis, acute inflammation or abnormal fluid   collection.      Electronically Signed by: BÁRBARA OLEARY M.D.    Signed on: 5/5/2022 11:19 AM                Physical Exam  Patient is awake and alert, no acute distress  Heart regular rate and rhythm  Lungs are clear bilaterally  Abdomen soft.  Patient has some suprapubic tenderness.  No guarding or rebound.      ED Course  I participated in the following activities of this patients care: the medical history, the physical exam, medical decision making.   I personally performed: supervision of the patient's care.   The case was discussed with: the non physician practitioners, Midlevel Provider.   Evaluation and management service: I agree with the evaluation and management decisions made in this patient's care.   Results interpretation: I agree with the study interpretation in this patient's care, History, physical, EMR documentation completed by Midlevel Provider has been reviewed and agree.     Vitals:    05/05/22 0910 05/05/22 0912 05/05/22 0916 05/05/22 1226   BP: 114/74   105/64   BP Location:    LUE - Left upper extremity   Patient Position:    Sitting   Pulse:   94 92   Resp:   20 20   Temp:  98.8 °F (37.1 °C)  98.2 °F (36.8 °C)   TempSrc:  Oral  Oral   SpO2:   100% 100%   Weight:               Impression and Plan    Diagnosis:  ED Diagnoses        Final diagnoses    Abdominal pain, unspecified abdominal location          Nausea          Cystitis                Condition:  STABLE      Disposition:  Discharge after Treatment 5/5/2022 11:58 AM  After passing PO challenge    Prescriptions:     Summary of your Discharge Medications      Take these Medications      Details   cephalexin 250 MG/5ML  suspension  Commonly known as: KEFLEX   Take 10 mLs by mouth every 12 hours for 7 days.              Patient Care Instructions:   1. Kem instructions were provided.      Follow Up Plan:  1.    Follow-up Information     Follow up With Specialties Details Why Contact Info    Madhuri Gonzales MD Pediatrics Schedule an appointment as soon as possible for a visit in 2 days follow up with pediatrician. [address] [place] [address] [address]  [facility] [id number] [phone]             2.  Follow up is needed :   > for re-examination.    3.  Recommended returning to the ED for any change in symptoms or status.    Discharged to Home .      Counseled:  Patient, Regarding diagnosis, Regarding diagnostic results, Regarding treatment and Patient understood                Jeanne Beckman DO  05/05/22 6913     0

## 2024-03-04 NOTE — H&P PST ADULT - NSANTHOSAYNRD_GEN_A_CORE
neck 15 inches/No. MARCOS screening performed.  STOP BANG Legend: 0-2 = LOW Risk; 3-4 = INTERMEDIATE Risk; 5-8 = HIGH Risk

## 2024-03-04 NOTE — H&P PST ADULT - NSICDXPASTSURGICALHX_GEN_ALL_CORE_FT
PAST SURGICAL HISTORY:  H/O bilateral salpingo-oophorectomy     History of breast reconstruction     History of right total mastectomy     History of tonsillectomy     Port-A-Cath in place

## 2024-03-04 NOTE — H&P PST ADULT - HISTORY OF PRESENT ILLNESS
32 y/o female with PMH of right breast cancer, s/p mastectomy, completed chemo 1/2023, RT completed 3/2023, continued on immune therapy presents for PST.  Patient states she feeling well overall today at PST and presents for pre op evaluation. Scheduled for left prophylactic mastectomy, mag trace injection left breast, bilateral breast reconstruction with abdominal based (NICOLLE) flaps, removal of left tissue expander with Dr Gross and Dr Siddiqui on 11/21/2023.   Patient is a 33 year old Fwith PMHx of right breast cancer, s/p mastectomy, completed chemo 1/2023, RT completed 3/2023, continued on immune therapy currently on PO hormonal therapy presents for perioperative testing for revision of bilateral reconstructed breasts, possible liposuction with fat grafting to bilateral revision of abdominal scar with local flaps with Dr. Siddiqui scheduled for 03/11/2024. Patient reports this is her revision portion of her reconstruction surgery. Denies any acute symptoms at this time. Otherwise patient reports feeling well overall today at PST.

## 2024-03-10 ENCOUNTER — TRANSCRIPTION ENCOUNTER (OUTPATIENT)
Age: 34
End: 2024-03-10

## 2024-03-11 ENCOUNTER — RESULT REVIEW (OUTPATIENT)
Age: 34
End: 2024-03-11

## 2024-03-11 ENCOUNTER — TRANSCRIPTION ENCOUNTER (OUTPATIENT)
Age: 34
End: 2024-03-11

## 2024-03-11 ENCOUNTER — OUTPATIENT (OUTPATIENT)
Dept: OUTPATIENT SERVICES | Facility: HOSPITAL | Age: 34
LOS: 1 days | Discharge: HOME | End: 2024-03-11
Payer: COMMERCIAL

## 2024-03-11 ENCOUNTER — OUTPATIENT (OUTPATIENT)
Dept: OUTPATIENT SERVICES | Facility: HOSPITAL | Age: 34
LOS: 1 days | End: 2024-03-11
Payer: COMMERCIAL

## 2024-03-11 ENCOUNTER — APPOINTMENT (OUTPATIENT)
Dept: PLASTIC SURGERY | Facility: HOSPITAL | Age: 34
End: 2024-03-11

## 2024-03-11 VITALS
RESPIRATION RATE: 13 BRPM | HEART RATE: 66 BPM | HEIGHT: 66 IN | DIASTOLIC BLOOD PRESSURE: 81 MMHG | TEMPERATURE: 98 F | WEIGHT: 194.01 LBS | OXYGEN SATURATION: 100 % | SYSTOLIC BLOOD PRESSURE: 125 MMHG

## 2024-03-11 VITALS
SYSTOLIC BLOOD PRESSURE: 112 MMHG | RESPIRATION RATE: 16 BRPM | TEMPERATURE: 97 F | OXYGEN SATURATION: 95 % | HEART RATE: 87 BPM | DIASTOLIC BLOOD PRESSURE: 74 MMHG

## 2024-03-11 DIAGNOSIS — Z95.828 PRESENCE OF OTHER VASCULAR IMPLANTS AND GRAFTS: Chronic | ICD-10-CM

## 2024-03-11 DIAGNOSIS — N65.0 DEFORMITY OF RECONSTRUCTED BREAST: ICD-10-CM

## 2024-03-11 DIAGNOSIS — Z90.89 ACQUIRED ABSENCE OF OTHER ORGANS: Chronic | ICD-10-CM

## 2024-03-11 DIAGNOSIS — C50.919 MALIGNANT NEOPLASM OF UNSPECIFIED SITE OF UNSPECIFIED FEMALE BREAST: ICD-10-CM

## 2024-03-11 DIAGNOSIS — Z98.890 OTHER SPECIFIED POSTPROCEDURAL STATES: Chronic | ICD-10-CM

## 2024-03-11 DIAGNOSIS — Z85.3 PERSONAL HISTORY OF MALIGNANT NEOPLASM OF BREAST: ICD-10-CM

## 2024-03-11 DIAGNOSIS — Z90.11 ACQUIRED ABSENCE OF RIGHT BREAST AND NIPPLE: Chronic | ICD-10-CM

## 2024-03-11 DIAGNOSIS — Z90.722 ACQUIRED ABSENCE OF OVARIES, BILATERAL: Chronic | ICD-10-CM

## 2024-03-11 PROCEDURE — 19380 REVJ RECONSTRUCTED BREAST: CPT | Mod: 50

## 2024-03-11 PROCEDURE — 15772 GRFG AUTOL FAT LIPO EA ADDL: CPT

## 2024-03-11 PROCEDURE — 14301 TIS TRNFR ANY 30.1-60 SQ CM: CPT

## 2024-03-11 PROCEDURE — ZZZZZ: CPT

## 2024-03-11 PROCEDURE — 15772 GRFG AUTOL FAT LIPO EA ADDL: CPT | Mod: 50

## 2024-03-11 PROCEDURE — 15771 GRFG AUTOL FAT LIPO 50 CC/<: CPT

## 2024-03-11 PROCEDURE — 88304 TISSUE EXAM BY PATHOLOGIST: CPT | Mod: 26

## 2024-03-11 PROCEDURE — 88304 TISSUE EXAM BY PATHOLOGIST: CPT

## 2024-03-11 PROCEDURE — C9399: CPT

## 2024-03-11 PROCEDURE — 15771 GRFG AUTOL FAT LIPO 50 CC/<: CPT | Mod: 50

## 2024-03-11 RX ORDER — DIPHENOXYLATE HCL/ATROPINE 2.5-.025MG
2 TABLET ORAL
Refills: 0 | DISCHARGE

## 2024-03-11 RX ORDER — HYDROMORPHONE HYDROCHLORIDE 2 MG/ML
0.25 INJECTION INTRAMUSCULAR; INTRAVENOUS; SUBCUTANEOUS ONCE
Refills: 0 | Status: DISCONTINUED | OUTPATIENT
Start: 2024-03-11 | End: 2024-03-11

## 2024-03-11 RX ORDER — HYDROMORPHONE HYDROCHLORIDE 2 MG/ML
0.5 INJECTION INTRAMUSCULAR; INTRAVENOUS; SUBCUTANEOUS ONCE
Refills: 0 | Status: DISCONTINUED | OUTPATIENT
Start: 2024-03-11 | End: 2024-03-11

## 2024-03-11 RX ORDER — LEVOCETIRIZINE DIHYDROCHLORIDE 0.5 MG/ML
1 SOLUTION ORAL
Qty: 0 | Refills: 0 | DISCHARGE

## 2024-03-11 RX ORDER — L.ACIDOPH/B.ANIMALIS/B.LONGUM 15B CELL
1 CAPSULE ORAL
Qty: 0 | Refills: 0 | DISCHARGE

## 2024-03-11 RX ORDER — EXEMESTANE 25 MG/1
1 TABLET, SUGAR COATED ORAL
Refills: 0 | DISCHARGE

## 2024-03-11 RX ORDER — OXYCODONE HYDROCHLORIDE 5 MG/1
5 TABLET ORAL ONCE
Refills: 0 | Status: DISCONTINUED | OUTPATIENT
Start: 2024-03-11 | End: 2024-03-11

## 2024-03-11 RX ORDER — CETIRIZINE HYDROCHLORIDE 10 MG/1
1 TABLET ORAL
Qty: 0 | Refills: 0 | DISCHARGE

## 2024-03-11 RX ORDER — ONDANSETRON 8 MG/1
4 TABLET, FILM COATED ORAL ONCE
Refills: 0 | Status: DISCONTINUED | OUTPATIENT
Start: 2024-03-11 | End: 2024-03-11

## 2024-03-11 RX ORDER — NERATINIB 40 MG/1
6 TABLET ORAL
Refills: 0 | DISCHARGE

## 2024-03-11 RX ORDER — ACETAMINOPHEN 500 MG
650 TABLET ORAL EVERY 6 HOURS
Refills: 0 | Status: DISCONTINUED | OUTPATIENT
Start: 2024-03-11 | End: 2024-03-25

## 2024-03-11 RX ORDER — OXYCODONE HYDROCHLORIDE 5 MG/1
1 TABLET ORAL
Qty: 6 | Refills: 0
Start: 2024-03-11 | End: 2024-03-12

## 2024-03-11 RX ORDER — SODIUM CHLORIDE 9 MG/ML
1000 INJECTION, SOLUTION INTRAVENOUS
Refills: 0 | Status: DISCONTINUED | OUTPATIENT
Start: 2024-03-11 | End: 2024-03-11

## 2024-03-11 RX ORDER — LEVOCETIRIZINE DIHYDROCHLORIDE 0.5 MG/ML
1 SOLUTION ORAL
Refills: 0 | DISCHARGE

## 2024-03-11 RX ORDER — VENLAFAXINE HCL 75 MG
1 CAPSULE, EXT RELEASE 24 HR ORAL
Refills: 0 | DISCHARGE

## 2024-03-11 RX ADMIN — HYDROMORPHONE HYDROCHLORIDE 0.5 MILLIGRAM(S): 2 INJECTION INTRAMUSCULAR; INTRAVENOUS; SUBCUTANEOUS at 15:09

## 2024-03-11 RX ADMIN — HYDROMORPHONE HYDROCHLORIDE 0.5 MILLIGRAM(S): 2 INJECTION INTRAMUSCULAR; INTRAVENOUS; SUBCUTANEOUS at 15:25

## 2024-03-11 NOTE — ASU DISCHARGE PLAN (ADULT/PEDIATRIC) - CARE PROVIDER_API CALL
Hugh Siddiqui)  Plastic Surgery  [address] [place] [address], [address]  [place] [address]  Phone: [phone]  Fax: [phone]  Scheduled Appointment: 03/14/2024

## 2024-03-11 NOTE — ASU PATIENT PROFILE, ADULT - SUBSTANCE USE COMMENT, PROFILE
Refill requested for:     Valtrex 1000mg tab  Last filled on:     9/27/18 #4 tablets, 5 refills    Last office visit:    1/22/2019   Pending office visit: 1/28/20    Medication can not be filled by protocol. Physician authorization required.     Denies recreational drug use

## 2024-03-11 NOTE — ASU DISCHARGE PLAN (ADULT/PEDIATRIC) - NS MD DC FALL RISK RISK
For information on Fall & Injury Prevention, visit: [email or web address] OR  [email or web address] OR  https://www.cdc.gov/steadi/patient.html

## 2024-03-11 NOTE — BRIEF OPERATIVE NOTE - NSICDXBRIEFPROCEDURE_GEN_ALL_CORE_FT
PROCEDURES:  Revision of deep inferior epigastric artery  (NICOLLE) flap previously created for breast reconstruction 11-Mar-2024 15:38:02  Yoko Wilburn  Revision of reconstruction of breast with liposuction and fat transfer 11-Mar-2024 15:38:21  Yoko Wilburn

## 2024-03-11 NOTE — ASU PATIENT PROFILE, ADULT - FALL HARM RISK - UNIVERSAL INTERVENTIONS
Bed in lowest position, wheels locked, appropriate side rails in place/Call bell, personal items and telephone in reach/Instruct patient to call for assistance before getting out of bed or chair/Non-slip footwear when patient is out of bed/[place] to call system/Purposeful Proactive Rounding/Room/bathroom lighting operational, light cord in reach

## 2024-03-11 NOTE — ASU DISCHARGE PLAN (ADULT/PEDIATRIC) - ASU DC SPECIAL INSTRUCTIONSFT
Call 911 and return to the ED for chest pain, shortness of breath, significant increase in pain, or significant change in color of surgical sites.  Keep surgical bra on until cleared.  - Do not lift your operated arm(s) above shoulder height.   - Do not push or pull yourself onto or off the bed with your operated arm(s). Instead use the roll technique to get in or out of bed.   - Avoid heavy activities and (sudden) lifting.  - Avoid lifting any objects that weigh more than 10 pounds or a gallon of milk.   - You may begin a walking program  - Be mindful of your posture while you are healing.  - Try not to be round shouldered or in a slouched posture.  - Avoid tub bathing until the sutures are well healed, usually three weeks.  Do not remove steri strips. They will fall off on their own.  After showering, please pat steri strips dry. After surgery, some blood may escape from under the tape. It is of no consequence. The paper tape may fall off after several days. If not, I will remove it in my office.  Please sponge bathe only until your first follow-up appointment.    Take medications as instructed by prescriptions.    You will be discharged with HÉCTOR drains. You will need to empty them and record outputs accurately. This will be taught to you by the nursing staff. Please do not remove the HÉCTOR drains. They will be removed in the office. Please bring to the office accurate records of output.     You have a transparent/purple liquid dressing (called Dermabond) over your surgical incisions called. Do NOT remove the Dermabond. IN a few days, the Dermabond will fall off (or peel off) on its ow

## 2024-03-14 ENCOUNTER — APPOINTMENT (OUTPATIENT)
Dept: PLASTIC SURGERY | Facility: CLINIC | Age: 34
End: 2024-03-14
Payer: COMMERCIAL

## 2024-03-14 VITALS
HEART RATE: 89 BPM | TEMPERATURE: 97.6 F | OXYGEN SATURATION: 98 % | HEIGHT: 66 IN | WEIGHT: 196 LBS | SYSTOLIC BLOOD PRESSURE: 108 MMHG | DIASTOLIC BLOOD PRESSURE: 74 MMHG | BODY MASS INDEX: 31.5 KG/M2

## 2024-03-14 PROCEDURE — ZZZZZ: CPT

## 2024-03-14 NOTE — HISTORY OF PRESENT ILLNESS
[FreeTextEntry1] : Ms. stuart is a 33 year old female with s/p  revision of bilateral reconstruction breasts possible liposuction. DOS: 3/11/24.   Patient presents for follow up. She has the drain on the left breast. and reports its only draining 5-10 cc per day. She forgot her drain log at home today. Patient denies fever, pain or chills.

## 2024-03-14 NOTE — ASSESSMENT
[FreeTextEntry1] : 33 year old female with s/p  revision of bilateral reconstruction breasts possible liposuction. DOS: 3/11/24.  Incision healing well.  PLAN: Ongoing plan of care discussed with patient. HÉCTOR drain removed today. Gauze dressing and Tagaderm applied. Advised to massage the incision site with cocoa butter, Aquaphor to prevent scar formation. Follow up in 2-3 weeks.

## 2024-03-15 LAB — SURGICAL PATHOLOGY STUDY: SIGNIFICANT CHANGE UP

## 2024-03-18 ENCOUNTER — RESULT REVIEW (OUTPATIENT)
Age: 34
End: 2024-03-18

## 2024-03-18 ENCOUNTER — APPOINTMENT (OUTPATIENT)
Dept: HEMATOLOGY ONCOLOGY | Facility: CLINIC | Age: 34
End: 2024-03-18
Payer: COMMERCIAL

## 2024-03-18 VITALS
HEART RATE: 70 BPM | RESPIRATION RATE: 15 BRPM | BODY MASS INDEX: 31.31 KG/M2 | SYSTOLIC BLOOD PRESSURE: 115 MMHG | WEIGHT: 194 LBS | OXYGEN SATURATION: 97 % | TEMPERATURE: 97.7 F | DIASTOLIC BLOOD PRESSURE: 82 MMHG

## 2024-03-18 LAB
BASOPHILS # BLD AUTO: 0.02 K/UL — SIGNIFICANT CHANGE UP (ref 0–0.2)
BASOPHILS NFR BLD AUTO: 0.3 % — SIGNIFICANT CHANGE UP (ref 0–2)
EOSINOPHIL # BLD AUTO: 0.16 K/UL — SIGNIFICANT CHANGE UP (ref 0–0.5)
EOSINOPHIL NFR BLD AUTO: 2 % — SIGNIFICANT CHANGE UP (ref 0–6)
HCT VFR BLD CALC: 32 % — LOW (ref 34.5–45)
HGB BLD-MCNC: 10 G/DL — LOW (ref 11.5–15.5)
IMM GRANULOCYTES NFR BLD AUTO: 0.5 % — SIGNIFICANT CHANGE UP (ref 0–0.9)
LYMPHOCYTES # BLD AUTO: 2.44 K/UL — SIGNIFICANT CHANGE UP (ref 1–3.3)
LYMPHOCYTES # BLD AUTO: 30.6 % — SIGNIFICANT CHANGE UP (ref 13–44)
MCHC RBC-ENTMCNC: 25.8 PG — LOW (ref 27–34)
MCHC RBC-ENTMCNC: 31.3 G/DL — LOW (ref 32–36)
MCV RBC AUTO: 82.5 FL — SIGNIFICANT CHANGE UP (ref 80–100)
MONOCYTES # BLD AUTO: 0.46 K/UL — SIGNIFICANT CHANGE UP (ref 0–0.9)
MONOCYTES NFR BLD AUTO: 5.8 % — SIGNIFICANT CHANGE UP (ref 2–14)
NEUTROPHILS # BLD AUTO: 4.85 K/UL — SIGNIFICANT CHANGE UP (ref 1.8–7.4)
NEUTROPHILS NFR BLD AUTO: 60.8 % — SIGNIFICANT CHANGE UP (ref 43–77)
NRBC # BLD: 0 /100 WBCS — SIGNIFICANT CHANGE UP (ref 0–0)
PLATELET # BLD AUTO: 290 K/UL — SIGNIFICANT CHANGE UP (ref 150–400)
RBC # BLD: 3.88 M/UL — SIGNIFICANT CHANGE UP (ref 3.8–5.2)
RBC # FLD: 15.9 % — HIGH (ref 10.3–14.5)
WBC # BLD: 7.97 K/UL — SIGNIFICANT CHANGE UP (ref 3.8–10.5)
WBC # FLD AUTO: 7.97 K/UL — SIGNIFICANT CHANGE UP (ref 3.8–10.5)

## 2024-03-18 PROCEDURE — G2211 COMPLEX E/M VISIT ADD ON: CPT | Mod: NC,1L

## 2024-03-18 PROCEDURE — 99214 OFFICE O/P EST MOD 30 MIN: CPT

## 2024-03-19 LAB
ALBUMIN SERPL ELPH-MCNC: 4.5 G/DL
ALP BLD-CCNC: 105 U/L
ALT SERPL-CCNC: 57 U/L
ANION GAP SERPL CALC-SCNC: 11 MMOL/L
AST SERPL-CCNC: 39 U/L
BILIRUB SERPL-MCNC: 0.3 MG/DL
BUN SERPL-MCNC: 12 MG/DL
CALCIUM SERPL-MCNC: 9.6 MG/DL
CHLORIDE SERPL-SCNC: 104 MMOL/L
CO2 SERPL-SCNC: 28 MMOL/L
CREAT SERPL-MCNC: 0.68 MG/DL
EGFR: 116 ML/MIN/1.73M2
GLUCOSE SERPL-MCNC: 88 MG/DL
MAGNESIUM SERPL-MCNC: 2.1 MG/DL
POTASSIUM SERPL-SCNC: 4.3 MMOL/L
PROT SERPL-MCNC: 6.6 G/DL
SODIUM SERPL-SCNC: 143 MMOL/L

## 2024-03-24 NOTE — HISTORY OF PRESENT ILLNESS
[de-identified] : Ms.Caitlyn Lui is a  31 year old female here for an evaluation of breast cancer. Her oncologic history is as follows:  Patient is 10 weeks and 5 days pregnant.  She had burning sensation in her breast last year and breast imaging on 11/3/2021 (BIRADS 2) showed no mammographic or sonographic evidence of malignancy. Pain went away.   She experienced right breast and axillary burning pain and noticed a right breast mass in April 2022. She underwent right breast US on 5/13/22 (BIRADS 4C) which showed a suspicious palpable mass in the right breast 9:00 and an additional suspicious mass in the right axillary tail, as well as axillary adenopathy.  She underwent right breast biopsy at 9:00 14cm FN, 11cmFn and right axilla biopsies on 5/14/2022 the right axillary tail at 9:00 14 cm FN, core biopsy showed Invasive poorly differentiated ductal carcinoma, Overton score 8/9, measuring 1.0 cm ER+ >90%, WI+ >90%, HER 2 POSITIVE.  No Ductal carcinoma in situ, microcalcifications or lymphovascular permeation noted. The right 9:00 11 cm FN, core biopsy revealed Invasive moderately differentiated ductal carcinoma, Lorraine score 7/9 measuring 0.9 cm, ER+ >90%, WI+ >90%, HER 2 POSITIVE.  Ductal carcinoma in situ, cribriform pattern with high nuclear grade and comedonecrosis noted, No microcalcifications or lymphovascular permeation noted The right axillary lymph node core biopsy was positive for metastasis measuring 1.0 cm.  She underwent Chest Xray and.US ABDOMEN  on 05/19/2022: Chest Xray MANI 05/19/2022 ABD US No stones seen within the gallbladder. Question of a tiny polyp versus fold within the gallbladder wall, Normal liver parenchyma No focal cystic or solid masses, Normal biliary ducts.  She is vaccinated against COVID-19.  She has a 21-month-old baby at home.  Previous pregnancy was not complicated, full-term delivery.  Patient underwent right mastectomy with Right axillary lymph node dissection on 6/21/2022. Pathology from right mastectomy showed multifocal moderate to poorly differentiated ductal carcinoma with chronic inflammatory infiltrate, measuring 5.5 cm and 2.3 cm.  High-grade DCIS was removed.  10 out of 16 lymph nodes were positive for metastatic disease (largest size 2.2 cm with extranodal extension)  pT3 pN3a.  Repeat markers requested.  Addendum showed ER/WI/HER2/sasha POSITIVE on both tumors  We discussed that her disease was way much more extensive than what was felt based on imaging.  She does report that her burning axillary pain has resolved.  She has postoperative pain which is manageable.  We discussed to proceed with dose dense ACT chemo in the second and third trimester of pregnancy.  Side effects related to patient as well as fetus were reviewed in detail.  Plan to give her 1 year of Herceptin and Perjeta after delivery.  She is likely also a candidate for neratinib.  Plan for PMR T after delivery.  Given multiple positive lymph nodes, she is at high risk for recurrence.  We will do CT chest abdomen pelvis and bone scan after delivery. She is at risk for lymphedema due to lymph node dissection and radiation.  She is referred to lymphedema therapy. She is being closely followed by MFM.  She is working from home  8/22/22: C1 AC, 19 weeks  Chemo s/e discussed Medication for symptom management reviewed and questions answered 7/29/22  ECHO LVEF 58% 05/19/2022 Chest Xray Lungs clear EKG done , Chemo consent obtained  left sided Medport in place, catheter tip  is located at the cavoatrial junction. LMP 3/7/2022 Patient is a anxious about stating chemo today Emotional support given  8/17/2022 Today She is 21 weeks 1 day ANDI 12/27/22 S/p C1 Tolerated well, here for AC  #2 today 8/17/2022 Counts good   11/4/22, taxol# 6, 33+ 3 weeks , ANDI 12/27/22 Plan for induction 12/5, last chemo 11/4 today CT scan, bone scan after delivery  THP starts 12/23 Plan discussed with MFM, OB, NICU in MDT meeting 11/3/22  She reports mild-moderate fatigue and altered taste after chemo. She was able to function, maintained PO intake, weight stable. She had mild nausea, took Reglan, no vomiting, no diarrhea or mouth sores. She had mild Bone pain from neulasta. No neuropathy, no fevers Baby moving well, monitored by MFM regularly . Normal fetal EKG and sono, no fetal wt issues Seen by rad onc 9/2022 BS and BP ok  12/23/22 Here for Taxol #7 and to start HP q 3 weeks x 17 doses. She delivered a healthy baby boy 12/9/22 6lbs 10 oz Energy and appetite back to baseline, occasional mild fatigue hair started growing back, no neuropathy  12/9/2022 CT CAP No evidence of metastatic disease in the thorax, abdomen, and pelvis. Bone scan ordered pending auth for scheduling  1/6/2023 Here for Taxol #9 Tolerating well she reports GI upset,and mild diarrhea 1-2 episodes occasionally. Imodium prn. Seen with Dr. Jason to discuss tx plan after completion of RT. Discussed use of tamoxifen vs AI + lupron, benefits of AI+Lupron, side effects , use of contraception and need to avoid pregnancy. Discussed that we would start Lupron for 6 months then consider BSO. Discussed the cons of early menopause such as increased risk of heart disease , osteoporosis and hyperlipidemia. We discussed the possibility of BL mastectomies as she is leaning toward mastectomy.  1/6/2023 Bone scan: No scan evidence of osseous metastasis. Will repeat SCANS at the completion of HP  1/120/23 Taxol HP # 11 She reports mild-moderate fatigue and altered taste x 3-4 days after chemo. She was able to function, maintained PO intake, weight stable. She had mild nausea, took Reglan, no vomiting,  mouth sores. No Bone pain, no neuropathy, no fevers. She had diarrhea day 5 x 1.5 days. Controlled with imodium   Pt will get RT after last taxol. We discussed use of tamoxifen vs AI + lupron, benefits of AI+Lupron, side effects , use of contraception and need to avoid pregnancy. Patient is young and with high risk disease. She is candidate for OS + AI based on SOFT/TEXT data. Discussed that we would start Lupron with last chemo, would do atleast for 6 months of lupron then consider BSO. Discussed the cons of early menopause such increased risk of heart disease , osteoporosis and hyperlipidemia.  We discussed the possibility of BL mastectomies as she is leaning toward mastectomies.  She is also a candidate for neratinib after HP due to high risk disease.  We will discuss this again per her request at next visit, she will bring spouse for discussion   CT CAP BONE scan MANI 12/2022. Will repeat SCANS at the completion of HP  Baby is doing well    2/2023 Chemo side effects are almost completely resolved. No neuropathy. Hair is growing back.  Appetite and energy levels are good.  She reports feeling anxious about her future, not feeling herself and worried about menopausal side effects.  She started seeing a therapist.referred by OB/GYNwhich she preferred. She is currently f/b psychologist for feelings of being overwhelmed.  She does not think that she is depressed and does not want to start antidepressant.  We will continue to evaluate and make a referral to see psychiatry as needed. Baby Gume is 10 weeks old, has developed allergy to formula.  She changed to amino acid formula and he is adjusting to it in terms of bowel habits.  She is overwhelmed. She had mild diarrhea with Herceptin Perjeta, recommend to use Imodium. She started postmastectomy radiation 3/6/23 tentative completion date 3/31/23. Endocrine therapy has been discussed with her.  She is a candidate for ovarian suppression plus AI.  She started Lupron 2/3/2023.  She will be monitored for toxicity.  We will start AI after radiation. RTO 4 weeks to monitor Lupron toxicity and mental health/postpartum issues.  Cardio referral to monitor for cardiotoxicity from katerin, HP and early menopause at a later time  6/9/2023 She has completed Taxol.  Herceptin Perjeta No 9  today.  Started Lupron 1/2023 tolerating well.  She reports moderate vaginal bleeding x 4 days 6/2-6/5/23 had to wear a pad.  Ovarian suppression not effective, Dr. Jason discussed scheduling BSO  ASAP  2/2 high risk disease. She has 2 young children girl and boy  Also discussed stopping AI at this time until BSO complete. Discussed  effects of early menopause Will refer to Dr. Minda Machado. Started Exemestane 4/28/2023. She developed painful rash/papules to face 2 weeks after starting AI. denies itching or sob.  6/23/23 She is having off/on diarrhea x 1 week taking imodium with some relief no abd pain, fever or vomiting No sick contact  Likely HP related She has very high risk disease with multiple positive nodes, therefore will not drop Perjeta aggressively manage diarrhea with IV hydration, electrolyte supplementation, imodium and lomotil d/w her Pt and spouse in agreement  9/2023 She had BSO 6/2023, experiencing extreme hot flashes, started effexor 37.5 mg 7/2023, helping but still have sx, rec to increase dose to 75 Tolerating HP, mild Gi sx with HP, no CP sx. Echo 8/2023 She is on exemestane, no arthralgias She has sternal/right chest pain. She has high risk disease. Will do CT scans to evaluate Paternal GF had colon cancer patient would like to have colonoscopy will revisit scheduling colonoscopy next RPA.    12/2023  s/p left mastectomy and revision of right reconstructed breast with removal of right tissue expander and bilateral NICOLLE flap reconstruction DOS: 11/21/23. She had BSO 6/2023, experiencing extreme hot flashes, started effexor 37.5 mg 7/2023, increased to 75mg 9/2023, helping but still have sx, rec to increase dose to 150  Tolerating HP, mild Gi sx with HP, no CP sx. Echo 8/2023, 11/2023. Last herceptin today, discussed nerlynx She is on exemestane, no arthralgias CT 9/2023 MANI Paternal GF had colon cancer patient would like to have colonoscopy will revisit scheduling colonoscopy next RPA. s/e of AI and premature menopause d/w her, referred to see cardio onc  S/E of nerlynx, dose escalation and anti diarrheal use d/w her She will start in 4 weeks.   1/24/2024 She is here with parents and spouse to discuss prognosis and risk of recurrence She started nerlynx 3 weeks ago, did well with 3 pills x 1 week, developed sevre diarrhea with cramping with 4 pills. Stopped last week and sx resolved.  She has questions about risk of recurrence and benefit from nerlynx.  We reviewed path - T3N3 ER/WI/HER+ High risk of recurrence due to large tumor size andd multiple nodes d/w them Better prognosis related to HER2 positivity reviewed EXTENET trial and benefit from neratinib for node + disease reviewed Given toxicity, rec to restart 3 pills a day. Use imodium, add lomotil Continue same dose for 3 weeks. If tolerated, would consider to go up to 4 pills a day  Mother was emotionally upset, she was reassured  2/2024 SHe is on nerlynx 3 pills a day  Still getting loose BM 8-10 days, despite taking 6-8 imodium a day  Wasn't able to fill lomotil , resent lomotil to vivo cut down neralynx to 2 pills a day given significant diarrhea.  If she continues to have significant GI toxicity with 2 pills, would probably stop  If diarrhea improves with addition of lomotil, consider increasing to 3 pills again   [de-identified] : Ms. TAMERA RUTLEDGE is a  31 year old female, diagnosed with clinically stage II (ER positive) patient ER/FL/HER2 POSITIVE right breast cancer in FIRST TRIMESTER of pregnancy.  Initial consult with me at 10 weeks.  Patient underwent right mastectomy with Right axillary lymph node dissection on 6/21/2022. pT3 pN3a. ER/FL/HER2/sasha POSITIVE. Adj ACTHP started 8/2/22 at 19 weeks gestation. Genetics : BRCA NEGATIVE . Healthy baby boy delivered at 37 week 12/2022. THP started 12/23/22. RT completed 3/2023. AI started 4/2023. BSO 6/2023. HP completed 12/2023. Nerlynx started 1/2024  3/18/24 She is on lomotil bid. imodium PRN and diarrhea is much better. She is on 3 pills of neralynx  She will try 4 pills next week  Had revision recently, slight anemia 2/2 surgery  BL MRM: no M/S needed She had BSO 6/2023, experiencing extreme hot flashes, started effexor 37.5 mg 7/2023, increased to 75mg 9/2023, helping but still have sx, now on 150. hot flashes are managable  She is on exemestane, no arthralgias CT 9/2023 MANI Paternal GF had colon cancer patient would like to have colonoscopy will revisit scheduling colonoscopy next RPA. s/e of AI and premature menopause d/w her, referred to see cardio onc

## 2024-03-24 NOTE — REASON FOR VISIT
[Follow-Up Visit] : a follow-up [Other: _____] : [unfilled] [FreeTextEntry2] :  ER+ >90%, KS+ >90%, HER 2+  Invasive poorly differentiated ductal carcinoma of the right breast

## 2024-03-24 NOTE — ASSESSMENT
[Curative] : Goals of care discussed with patient: Curative [FreeTextEntry1] : Ms. TAMERA RUTLEDGE is a 31 year old female, diagnosed with clinically stage II (ER positive) patient ER/MI/HER2 POSITIVE right breast cancer in FIRST TRIMESTER of pregnancy. Initial consult with me at 10 weeks. Patient underwent right mastectomy with Right axillary lymph node dissection on 6/21/2022. pT3 pN3a. ER/MI/HER2/sasha POSITIVE. Adj ACTHP started 8/2/22 at 19 weeks gestation. RBCA neg. Healthy baby boy delivered at 37 week 12/9/2022. THP started 12/23/22. Started Exemestane 4/28/2023  1. Breast ca- Ms. TAMERA RUTLEDGE completed Taxol. here for Herceptin Perjeta. Started Lupron 1/2023. Started Exemestane 4/28/2023. RT completed 3/2023. BSO 6/2023. HP completed 12/2023. Nerlynx started 1/2024     3/18/24 She is on lomotil bid. imodium PRN and diarrhea is much better. She is on 3 pills of neralynx  She will try 4 pills next week  Had revision recently, slight anemia 2/2 surgery  BL MRM: no M/S needed She had BSO 6/2023, experiencing extreme hot flashes, started effexor 37.5 mg 7/2023, increased to 75mg 9/2023, helping but still have sx, now on 150. hot flashes are managable  She is on exemestane, no arthralgias CT 9/2023 MANI Paternal GF had colon cancer patient would like to have colonoscopy will revisit scheduling colonoscopy next RPA. s/e of AI and premature menopause d/w her, referred to see cardio onc   - She is at risk for lymphedema due to lymph node dissection and radiation. She is referred to lymphedema therapy. - Chemo induced diarrhea- Imodium PRN. Maintain PO hydration. BRAT diet - Chemo cardio toxicity: Cardio referral to monitor for cardiotoxicity from katerin, HP and early menopause. Echo q3m - Instructed to call office and go directly to the emergency room with fever more than 100.4, shaking chills, productive cough, sore throat, shortness of breath or urinary symptoms. Patient verbalized understanding and agreement. - Emotional support provided, all questions answered.  RTO 1 m

## 2024-03-24 NOTE — PHYSICAL EXAM
[Fully active, able to carry on all pre-disease performance without restriction] : Status 0 - Fully active, able to carry on all pre-disease performance without restriction [Normal] : affect appropriate [de-identified] : port removed [de-identified] : BL NICOLLE flaps healied well

## 2024-03-28 ENCOUNTER — APPOINTMENT (OUTPATIENT)
Dept: PLASTIC SURGERY | Facility: CLINIC | Age: 34
End: 2024-03-28
Payer: COMMERCIAL

## 2024-03-28 VITALS
HEART RATE: 88 BPM | SYSTOLIC BLOOD PRESSURE: 123 MMHG | HEIGHT: 66 IN | DIASTOLIC BLOOD PRESSURE: 75 MMHG | RESPIRATION RATE: 16 BRPM | WEIGHT: 194 LBS | BODY MASS INDEX: 31.18 KG/M2 | OXYGEN SATURATION: 98 %

## 2024-03-28 PROCEDURE — ZZZZZ: CPT

## 2024-03-28 NOTE — ASSESSMENT
[FreeTextEntry1] : 33 year old female with s/p revision of bilateral reconstruction breasts possible liposuction. DOS: 3/11/24.  Yohan breast still with mild swollen. Bacitracin applied to incision sites and underneath left breast and cover with gauze.  PLAN: Ongoing plan of care discussed with patient. Continue to apply bacitracin to the incision area and underneath left breast and cover with gauze. If drain improves, can cancel next week appointment. Follow in one to two weeks.

## 2024-03-28 NOTE — HISTORY OF PRESENT ILLNESS
[FreeTextEntry1] : Ms. stuart is a 33 year old female with s/p revision of bilateral reconstruction breasts possible liposuction. DOS: 3/11/24. Patient report an open area underneath left breast with minimal drainage and mild redness. She reports pain.

## 2024-03-29 ENCOUNTER — APPOINTMENT (OUTPATIENT)
Dept: INTERNAL MEDICINE | Facility: CLINIC | Age: 34
End: 2024-03-29

## 2024-03-29 ENCOUNTER — NON-APPOINTMENT (OUTPATIENT)
Age: 34
End: 2024-03-29

## 2024-04-04 ENCOUNTER — APPOINTMENT (OUTPATIENT)
Dept: PLASTIC SURGERY | Facility: CLINIC | Age: 34
End: 2024-04-04
Payer: COMMERCIAL

## 2024-04-04 VITALS
SYSTOLIC BLOOD PRESSURE: 120 MMHG | HEIGHT: 66 IN | BODY MASS INDEX: 31.18 KG/M2 | OXYGEN SATURATION: 98 % | WEIGHT: 194 LBS | HEART RATE: 96 BPM | TEMPERATURE: 98.3 F | DIASTOLIC BLOOD PRESSURE: 87 MMHG

## 2024-04-04 PROCEDURE — ZZZZZ: CPT

## 2024-04-06 NOTE — ASSESSMENT
[FreeTextEntry1] : 33 year old female with s/p revision of bilateral reconstruction breasts possible liposuction. DOS: 3/11/24. Wet to dry gauze applied to the incision underneath the left breast and apply a dry gauze to cover.  PLAN: Ongoing plan of care discussed with patient. Continue wet to dry dressing daily. Discontinue using Neosporin. Follow up in two weeks.

## 2024-04-06 NOTE — HISTORY OF PRESENT ILLNESS
[FreeTextEntry1] : Ms. stuart is a 33 year old female with s/p revision of bilateral reconstruction breasts possible liposuction. DOS: 3/11/24. She presents for follow up.  She reports drainage continues under left breast., applying Neosporin and gauze. She denies pain, fever, and chills or rashes.

## 2024-05-02 ENCOUNTER — APPOINTMENT (OUTPATIENT)
Dept: PLASTIC SURGERY | Facility: CLINIC | Age: 34
End: 2024-05-02
Payer: COMMERCIAL

## 2024-05-02 VITALS
WEIGHT: 194 LBS | BODY MASS INDEX: 31.18 KG/M2 | HEART RATE: 98 BPM | SYSTOLIC BLOOD PRESSURE: 123 MMHG | TEMPERATURE: 98.4 F | OXYGEN SATURATION: 98 % | DIASTOLIC BLOOD PRESSURE: 89 MMHG | HEIGHT: 66 IN

## 2024-05-02 PROCEDURE — 99024 POSTOP FOLLOW-UP VISIT: CPT

## 2024-05-02 NOTE — HISTORY OF PRESENT ILLNESS
[FreeTextEntry1] : Ms. stuart is a 33 year old female with s/p revision of bilateral reconstruction breasts possible liposuction. DOS: 3/11/24. She presents for follow up. She reports wounds on left breast with no improvement. She denies pain, fever, and chills or rashes.

## 2024-05-02 NOTE — ADDENDUM
[FreeTextEntry1] :  I, Steffanie Coleman, documented this note as a scribe on behalf of Dr. Hugh Siddiqui MD on 04/18/2024.

## 2024-05-02 NOTE — PHYSICAL EXAM
[de-identified] : left breast along the IMF with areas of wound dehiscence   granulation with no drainage noted  rest of incision clean, dry, and intact   no masses  good overall contour  right breast flap is well incorporated with good contour

## 2024-05-02 NOTE — END OF VISIT
[FreeTextEntry3] :   All medical record entries made by the Scribe were at my, Dr. Hugh Siddiqui MD, direction and personally dictated by me on 04/18/2024. I have reviewed the chart and agree that the record accurately reflects my personal performance of the history, physical exam, assessment and plan. I have also personally directed, reviewed, and agreed with the chart.

## 2024-05-13 ENCOUNTER — OUTPATIENT (OUTPATIENT)
Dept: OUTPATIENT SERVICES | Facility: HOSPITAL | Age: 34
LOS: 1 days | Discharge: HOME | End: 2024-05-13

## 2024-05-13 DIAGNOSIS — C50.919 MALIGNANT NEOPLASM OF UNSPECIFIED SITE OF UNSPECIFIED FEMALE BREAST: ICD-10-CM

## 2024-05-13 DIAGNOSIS — Z90.11 ACQUIRED ABSENCE OF RIGHT BREAST AND NIPPLE: Chronic | ICD-10-CM

## 2024-05-13 DIAGNOSIS — Z90.722 ACQUIRED ABSENCE OF OVARIES, BILATERAL: Chronic | ICD-10-CM

## 2024-05-13 DIAGNOSIS — Z95.828 PRESENCE OF OTHER VASCULAR IMPLANTS AND GRAFTS: Chronic | ICD-10-CM

## 2024-05-20 ENCOUNTER — APPOINTMENT (OUTPATIENT)
Dept: HEMATOLOGY ONCOLOGY | Facility: CLINIC | Age: 34
End: 2024-05-20
Payer: COMMERCIAL

## 2024-05-20 ENCOUNTER — RESULT REVIEW (OUTPATIENT)
Age: 34
End: 2024-05-20

## 2024-05-20 VITALS
SYSTOLIC BLOOD PRESSURE: 131 MMHG | HEART RATE: 87 BPM | DIASTOLIC BLOOD PRESSURE: 93 MMHG | RESPIRATION RATE: 16 BRPM | BODY MASS INDEX: 32.24 KG/M2 | TEMPERATURE: 98.1 F | OXYGEN SATURATION: 97 % | HEIGHT: 66 IN | WEIGHT: 200.62 LBS

## 2024-05-20 LAB
BASOPHILS # BLD AUTO: 0.03 K/UL — SIGNIFICANT CHANGE UP (ref 0–0.2)
BASOPHILS NFR BLD AUTO: 0.4 % — SIGNIFICANT CHANGE UP (ref 0–2)
EOSINOPHIL # BLD AUTO: 0.16 K/UL — SIGNIFICANT CHANGE UP (ref 0–0.5)
EOSINOPHIL NFR BLD AUTO: 2 % — SIGNIFICANT CHANGE UP (ref 0–6)
HCT VFR BLD CALC: 38.2 % — SIGNIFICANT CHANGE UP (ref 34.5–45)
HGB BLD-MCNC: 12.5 G/DL — SIGNIFICANT CHANGE UP (ref 11.5–15.5)
IMM GRANULOCYTES NFR BLD AUTO: 0.2 % — SIGNIFICANT CHANGE UP (ref 0–0.9)
LYMPHOCYTES # BLD AUTO: 2.5 K/UL — SIGNIFICANT CHANGE UP (ref 1–3.3)
LYMPHOCYTES # BLD AUTO: 31.1 % — SIGNIFICANT CHANGE UP (ref 13–44)
MCHC RBC-ENTMCNC: 27.7 PG — SIGNIFICANT CHANGE UP (ref 27–34)
MCHC RBC-ENTMCNC: 32.7 G/DL — SIGNIFICANT CHANGE UP (ref 32–36)
MCV RBC AUTO: 84.7 FL — SIGNIFICANT CHANGE UP (ref 80–100)
MONOCYTES # BLD AUTO: 0.45 K/UL — SIGNIFICANT CHANGE UP (ref 0–0.9)
MONOCYTES NFR BLD AUTO: 5.6 % — SIGNIFICANT CHANGE UP (ref 2–14)
NEUTROPHILS # BLD AUTO: 4.89 K/UL — SIGNIFICANT CHANGE UP (ref 1.8–7.4)
NEUTROPHILS NFR BLD AUTO: 60.7 % — SIGNIFICANT CHANGE UP (ref 43–77)
NRBC # BLD: 0 /100 WBCS — SIGNIFICANT CHANGE UP (ref 0–0)
PLATELET # BLD AUTO: 315 K/UL — SIGNIFICANT CHANGE UP (ref 150–400)
RBC # BLD: 4.51 M/UL — SIGNIFICANT CHANGE UP (ref 3.8–5.2)
RBC # FLD: 14.6 % — HIGH (ref 10.3–14.5)
WBC # BLD: 8.05 K/UL — SIGNIFICANT CHANGE UP (ref 3.8–10.5)
WBC # FLD AUTO: 8.05 K/UL — SIGNIFICANT CHANGE UP (ref 3.8–10.5)

## 2024-05-20 PROCEDURE — G2211 COMPLEX E/M VISIT ADD ON: CPT | Mod: NC,1L

## 2024-05-20 PROCEDURE — 99214 OFFICE O/P EST MOD 30 MIN: CPT

## 2024-05-20 RX ORDER — EXEMESTANE 25 MG/1
25 TABLET, FILM COATED ORAL
Qty: 1 | Refills: 1 | Status: ACTIVE | COMMUNITY
Start: 2023-04-28

## 2024-05-20 NOTE — HISTORY OF PRESENT ILLNESS
[de-identified] : Ms.Caitlyn Lui is a  31 year old female here for an evaluation of breast cancer. Her oncologic history is as follows:  Patient is 10 weeks and 5 days pregnant.  She had burning sensation in her breast last year and breast imaging on 11/3/2021 (BIRADS 2) showed no mammographic or sonographic evidence of malignancy. Pain went away.   She experienced right breast and axillary burning pain and noticed a right breast mass in April 2022. She underwent right breast US on 5/13/22 (BIRADS 4C) which showed a suspicious palpable mass in the right breast 9:00 and an additional suspicious mass in the right axillary tail, as well as axillary adenopathy.  She underwent right breast biopsy at 9:00 14cm FN, 11cmFn and right axilla biopsies on 5/14/2022 the right axillary tail at 9:00 14 cm FN, core biopsy showed Invasive poorly differentiated ductal carcinoma, [place] score 8/9, measuring 1.0 cm ER+ >90%, MA+ >90%, HER 2 POSITIVE.  No Ductal carcinoma in situ, microcalcifications or lymphovascular permeation noted. The right 9:00 11 cm FN, core biopsy revealed Invasive moderately differentiated ductal carcinoma, [place] score 7/9 measuring 0.9 cm, ER+ >90%, MA+ >90%, HER 2 POSITIVE.  Ductal carcinoma in situ, cribriform pattern with high nuclear grade and comedonecrosis noted, No microcalcifications or lymphovascular permeation noted The right axillary lymph node core biopsy was positive for metastasis measuring 1.0 cm.  She underwent Chest Xray and.US ABDOMEN  on 05/19/2022: Chest Xray MANI 05/19/2022 ABD US No stones seen within the gallbladder. Question of a tiny polyp versus fold within the gallbladder wall, Normal liver parenchyma No focal cystic or solid masses, Normal biliary ducts.  She is vaccinated against COVID-19.  She has a 21-month-old baby at home.  Previous pregnancy was not complicated, full-term delivery.  Patient underwent right mastectomy with Right axillary lymph node dissection on 6/21/2022. Pathology from right mastectomy showed multifocal moderate to poorly differentiated ductal carcinoma with chronic inflammatory infiltrate, measuring 5.5 cm and 2.3 cm.  High-grade DCIS was removed.  10 out of 16 lymph nodes were positive for metastatic disease (largest size 2.2 cm with extranodal extension)  pT3 pN3a.  Repeat markers requested.  Addendum showed ER/MA/HER2/sasha POSITIVE on both tumors  We discussed that her disease was way much more extensive than what was felt based on imaging.  She does report that her burning axillary pain has resolved.  She has postoperative pain which is manageable.  We discussed to proceed with dose dense ACT chemo in the second and third trimester of pregnancy.  Side effects related to patient as well as fetus were reviewed in detail.  Plan to give her 1 year of Herceptin and Perjeta after delivery.  She is likely also a candidate for neratinib.  Plan for PMR T after delivery.  Given multiple positive lymph nodes, she is at high risk for recurrence.  We will do CT chest abdomen pelvis and bone scan after delivery. She is at risk for lymphedema due to lymph node dissection and radiation.  She is referred to lymphedema therapy. She is being closely followed by MFM.  She is working from home  8/22/22: C1 AC, 19 weeks  Chemo s/e discussed Medication for symptom management reviewed and questions answered 7/29/22  ECHO LVEF 58% 05/19/2022 Chest Xray Lungs clear EKG done , Chemo consent obtained  left sided Medport in place, catheter tip  is located at the cavoatrial junction. LMP 3/7/2022 Patient is a anxious about stating chemo today Emotional support given  8/17/2022 Today She is 21 weeks 1 day ANDI 12/27/22 S/p C1 Tolerated well, here for AC  #2 today 8/17/2022 Counts good   11/4/22, taxol# 6, 33+ 3 weeks , ANDI 12/27/22 Plan for induction 12/5, last chemo 11/4 today CT scan, bone scan after delivery  THP starts 12/23 Plan discussed with MFM, OB, NICU in MDT meeting 11/3/22  She reports mild-moderate fatigue and altered taste after chemo. She was able to function, maintained PO intake, weight stable. She had mild nausea, took Reglan, no vomiting, no diarrhea or mouth sores. She had mild Bone pain from neulasta. No neuropathy, no fevers Baby moving well, monitored by MFM regularly . Normal fetal EKG and sono, no fetal wt issues Seen by rad onc 9/2022 BS and BP ok  12/23/22 Here for Taxol #7 and to start HP q 3 weeks x 17 doses. She delivered a healthy baby boy 12/9/22 6lbs 10 oz Energy and appetite back to baseline, occasional mild fatigue hair started growing back, no neuropathy  12/9/2022 CT CAP No evidence of metastatic disease in the thorax, abdomen, and pelvis. Bone scan ordered pending auth for scheduling  1/6/2023 Here for Taxol #9 Tolerating well she reports GI upset,and mild diarrhea 1-2 episodes occasionally. Imodium prn. Seen with Dr. Jason to discuss tx plan after completion of RT. Discussed use of tamoxifen vs AI + lupron, benefits of AI+Lupron, side effects , use of contraception and need to avoid pregnancy. Discussed that we would start Lupron for 6 months then consider BSO. Discussed the cons of early menopause such as increased risk of heart disease , osteoporosis and hyperlipidemia. We discussed the possibility of BL mastectomies as she is leaning toward mastectomy.  1/6/2023 Bone scan: No scan evidence of osseous metastasis. Will repeat SCANS at the completion of HP  1/120/23 Taxol HP # 11 She reports mild-moderate fatigue and altered taste x 3-4 days after chemo. She was able to function, maintained PO intake, weight stable. She had mild nausea, took Reglan, no vomiting,  mouth sores. No Bone pain, no neuropathy, no fevers. She had diarrhea day 5 x 1.5 days. Controlled with imodium   Pt will get RT after last taxol. We discussed use of tamoxifen vs AI + lupron, benefits of AI+Lupron, side effects , use of contraception and need to avoid pregnancy. Patient is young and with high risk disease. She is candidate for OS + AI based on SOFT/TEXT data. Discussed that we would start Lupron with last chemo, would do atleast for 6 months of lupron then consider BSO. Discussed the cons of early menopause such increased risk of heart disease , osteoporosis and hyperlipidemia.  We discussed the possibility of BL mastectomies as she is leaning toward mastectomies.  She is also a candidate for neratinib after HP due to high risk disease.  We will discuss this again per her request at next visit, she will bring spouse for discussion   CT CAP BONE scan MANI 12/2022. Will repeat SCANS at the completion of HP  Baby is doing well    2/2023 Chemo side effects are almost completely resolved. No neuropathy. Hair is growing back.  Appetite and energy levels are good.  She reports feeling anxious about her future, not feeling herself and worried about menopausal side effects.  She started seeing a therapist.referred by OB/GYNwhich she preferred. She is currently f/b psychologist for feelings of being overwhelmed.  She does not think that she is depressed and does not want to start antidepressant.  We will continue to evaluate and make a referral to see psychiatry as needed. Baby Gume is 10 weeks old, has developed allergy to formula.  She changed to amino acid formula and he is adjusting to it in terms of bowel habits.  She is overwhelmed. She had mild diarrhea with Herceptin Perjeta, recommend to use Imodium. She started postmastectomy radiation 3/6/23 tentative completion date 3/31/23. Endocrine therapy has been discussed with her.  She is a candidate for ovarian suppression plus AI.  She started Lupron 2/3/2023.  She will be monitored for toxicity.  We will start AI after radiation. RTO 4 weeks to monitor Lupron toxicity and mental health/postpartum issues.  Cardio referral to monitor for cardiotoxicity from katerin, HP and early menopause at a later time  6/9/2023 She has completed Taxol.  Herceptin Perjeta No 9  today.  Started Lupron 1/2023 tolerating well.  She reports moderate vaginal bleeding x 4 days 6/2-6/5/23 had to wear a pad.  Ovarian suppression not effective, Dr. Jason discussed scheduling BSO  ASAP  2/2 high risk disease. She has 2 young children girl and boy  Also discussed stopping AI at this time until BSO complete. Discussed  effects of early menopause Will refer to Dr. Minda Machado. Started Exemestane 4/28/2023. She developed painful rash/papules to face 2 weeks after starting AI. denies itching or sob.  6/23/23 She is having off/on diarrhea x 1 week taking imodium with some relief no abd pain, fever or vomiting No sick contact  Likely HP related She has very high risk disease with multiple positive nodes, therefore will not drop Perjeta aggressively manage diarrhea with IV hydration, electrolyte supplementation, imodium and lomotil d/w her Pt and spouse in agreement  9/2023 She had BSO 6/2023, experiencing extreme hot flashes, started effexor 37.5 mg 7/2023, helping but still have sx, rec to increase dose to 75 Tolerating HP, mild Gi sx with HP, no CP sx. Echo 8/2023 She is on exemestane, no arthralgias She has sternal/right chest pain. She has high risk disease. Will do CT scans to evaluate Paternal GF had colon cancer patient would like to have colonoscopy will revisit scheduling colonoscopy next RPA.    12/2023  s/p left mastectomy and revision of right reconstructed breast with removal of right tissue expander and bilateral NICOLLE flap reconstruction DOS: 11/21/23. She had BSO 6/2023, experiencing extreme hot flashes, started effexor 37.5 mg 7/2023, increased to 75mg 9/2023, helping but still have sx, rec to increase dose to 150  Tolerating HP, mild Gi sx with HP, no CP sx. Echo 8/2023, 11/2023. Last herceptin today, discussed nerlynx She is on exemestane, no arthralgias CT 9/2023 MANI Paternal GF had colon cancer patient would like to have colonoscopy will revisit scheduling colonoscopy next RPA. s/e of AI and premature menopause d/w her, referred to see cardio onc  S/E of nerlynx, dose escalation and anti diarrheal use d/w her She will start in 4 weeks.   1/24/2024 She is here with parents and spouse to discuss prognosis and risk of recurrence She started nerlynx 3 weeks ago, did well with 3 pills x 1 week, developed sevre diarrhea with cramping with 4 pills. Stopped last week and sx resolved.  She has questions about risk of recurrence and benefit from nerlynx.  We reviewed path - T3N3 ER/MA/HER+ High risk of recurrence due to large tumor size andd multiple nodes d/w them Better prognosis related to HER2 positivity reviewed EXTENET trial and benefit from neratinib for node + disease reviewed Given toxicity, rec to restart 3 pills a day. Use imodium, add lomotil Continue same dose for 3 weeks. If tolerated, would consider to go up to 4 pills a day  Mother was emotionally upset, she was reassured  2/2024 SHe is on nerlynx 3 pills a day  Still getting loose BM 8-10 days, despite taking 6-8 imodium a day  Wasn't able to fill lomotil , resent lomotil to [facility] cut down neralynx to 2 pills a day given significant diarrhea.  If she continues to have significant GI toxicity with 2 pills, would probably stop  If diarrhea improves with addition of lomotil, consider increasing to 3 pills again   [de-identified] : Ms. TAMERA RUTLEDGE is a  31 year old female, diagnosed with clinically stage II (ER positive) patient ER/MN/HER2 POSITIVE right breast cancer in FIRST TRIMESTER of pregnancy.  Initial consult with me at 10 weeks.  Patient underwent right mastectomy with Right axillary lymph node dissection on 6/21/2022. pT3 pN3a. ER/MN/HER2/sasha POSITIVE. Adj ACTHP started 8/2/22 at 19 weeks gestation. Genetics : BRCA NEGATIVE . Healthy baby boy delivered at 37 week 12/2022. THP started 12/23/22. RT completed 3/2023. AI started 4/2023. BSO 6/2023. HP completed 12/2023. Nerlynx started 1/2024  3/18/24 She is on lomotil bid. imodium PRN and diarrhea is much better. She is on 3 pills of neralynx  She will try 4 pills next week  Had revision recently, slight anemia 2/2 surgery  BL MRM: no M/S needed She had BSO 6/2023, experiencing extreme hot flashes, started effexor 37.5 mg 7/2023, increased to 75mg 9/2023, helping but still have sx, now on 150. hot flashes are managable  She is on exemestane, no arthralgias CT 9/2023 MANI Paternal GF had colon cancer patient would like to have colonoscopy will revisit scheduling colonoscopy next RPA. s/e of AI and premature menopause d/w her, referred to see cardio onc  5/20/24: She is on now on 4 pills of Nerlynx. Will consider taking 5 pills in the near future. Refill of medication sent to [facility].  Taking 1 pill of Lomotil Daily, Imodium PRN- infrequent use Breast Health: no need for additional imaging; s/p b/l NICOLLE Flap reconstruction in November 2023 Bone Health: next imaging due: July 2025 DEXA Bone Health: 7/5/23; WNL.  Labs ordered today: CBC, CMP, CEA, CA 27.29 Plans to travel this summer to the [place] with her family. Her youngest is about to enter [employer].

## 2024-05-20 NOTE — REASON FOR VISIT
[Follow-Up Visit] : a follow-up [Other: _____] : [unfilled] [FreeTextEntry2] :  ER+ >90%, WA+ >90%, HER 2+  Invasive poorly differentiated ductal carcinoma of the right breast

## 2024-05-20 NOTE — ASSESSMENT
[Curative] : Goals of care discussed with patient: Curative [FreeTextEntry1] : Ms. TAMERA RUTLEDGE is a 31 year old female, diagnosed with clinically stage II (ER positive) patient ER/MN/HER2 POSITIVE right breast cancer in FIRST TRIMESTER of pregnancy. Initial consult with me at 10 weeks. Patient underwent right mastectomy with Right axillary lymph node dissection on 6/21/2022. pT3 pN3a. ER/MN/HER2/sasha POSITIVE. Adj ACTHP started 8/2/22 at 19 weeks gestation. RBCA neg. Healthy baby boy delivered at 37 week 12/9/2022. THP started 12/23/22. Started Exemestane 4/28/2023  1. Breast ca- Ms. TAMERA RUTLEDGE completed Taxol. here for Herceptin Perjeta. Started Lupron 1/2023. Started Exemestane 4/28/2023. RT completed 3/2023. BSO 6/2023. HP completed 12/2023. Nerlynx started 1/2024 5/2024 She is on lomotil bid. imodium PRN and diarrhea is much better. She is on 4 pills of neralynx  She will try 5 and 6 pills next week  Had revision recently, slight anemia 2/2 surgery  BL MRM: no M/S needed She had BSO 6/2023, experiencing extreme hot flashes, started effexor 37.5 mg 7/2023, increased to 75mg 9/2023, helping but still have sx, now on 150. hot flashes are managable  She is on exemestane, no arthralgias CT 9/2023 MANI Paternal GF had colon cancer patient would like to have colonoscopy will revisit scheduling colonoscopy next RPA. s/e of AI and premature menopause d/w her, referred to see cardio onc   - She is at risk for lymphedema due to lymph node dissection and radiation. She is referred to lymphedema therapy. - Chemo induced diarrhea- Imodium PRN. Maintain PO hydration. BRAT diet - Chemo cardio toxicity: Cardio referral to monitor for cardiotoxicity from katerin, HP and early menopause. Echo q3m - Instructed to call office and go directly to the emergency room with fever more than 100.4, shaking chills, productive cough, sore throat, shortness of breath or urinary symptoms. Patient verbalized understanding and agreement. - Emotional support provided, all questions answered.  RTO  2 m

## 2024-05-20 NOTE — PHYSICAL EXAM
[Fully active, able to carry on all pre-disease performance without restriction] : Status 0 - Fully active, able to carry on all pre-disease performance without restriction [Normal] : affect appropriate [de-identified] : port removed [de-identified] : BL NICOLLE flaps healied well

## 2024-05-23 ENCOUNTER — APPOINTMENT (OUTPATIENT)
Dept: PLASTIC SURGERY | Facility: CLINIC | Age: 34
End: 2024-05-23

## 2024-05-23 LAB
ALBUMIN SERPL ELPH-MCNC: 5.1 G/DL
ALP BLD-CCNC: 112 U/L
ALT SERPL-CCNC: 19 U/L
ANION GAP SERPL CALC-SCNC: 16 MMOL/L
AST SERPL-CCNC: 26 U/L
BILIRUB SERPL-MCNC: 0.2 MG/DL
BUN SERPL-MCNC: 13 MG/DL
CALCIUM SERPL-MCNC: 9.8 MG/DL
CANCER AG27-29 SERPL-ACNC: 12.5 U/ML
CEA SERPL-MCNC: 1.4 NG/ML
CHLORIDE SERPL-SCNC: 101 MMOL/L
CO2 SERPL-SCNC: 25 MMOL/L
CREAT SERPL-MCNC: 0.7 MG/DL
EGFR: 116 ML/MIN/1.73M2
GLUCOSE SERPL-MCNC: 93 MG/DL
POTASSIUM SERPL-SCNC: 4.1 MMOL/L
PROT SERPL-MCNC: 7.7 G/DL
SODIUM SERPL-SCNC: 142 MMOL/L

## 2024-06-03 ENCOUNTER — APPOINTMENT (OUTPATIENT)
Dept: GASTROENTEROLOGY | Facility: CLINIC | Age: 34
End: 2024-06-03
Payer: COMMERCIAL

## 2024-06-03 VITALS
BODY MASS INDEX: 32.14 KG/M2 | HEART RATE: 104 BPM | WEIGHT: 200 LBS | DIASTOLIC BLOOD PRESSURE: 84 MMHG | OXYGEN SATURATION: 97 % | HEIGHT: 66 IN | SYSTOLIC BLOOD PRESSURE: 124 MMHG

## 2024-06-03 DIAGNOSIS — Z12.11 ENCOUNTER FOR SCREENING FOR MALIGNANT NEOPLASM OF COLON: ICD-10-CM

## 2024-06-03 DIAGNOSIS — Z83.719 FAMILY HISTORY OF COLON POLYPS, UNSPECIFIED: ICD-10-CM

## 2024-06-03 DIAGNOSIS — Z80.0 FAMILY HISTORY OF MALIGNANT NEOPLASM OF DIGESTIVE ORGANS: ICD-10-CM

## 2024-06-03 DIAGNOSIS — K62.5 HEMORRHAGE OF ANUS AND RECTUM: ICD-10-CM

## 2024-06-03 DIAGNOSIS — R19.7 DIARRHEA, UNSPECIFIED: ICD-10-CM

## 2024-06-03 DIAGNOSIS — Z85.3 PERSONAL HISTORY OF MALIGNANT NEOPLASM OF BREAST: ICD-10-CM

## 2024-06-03 DIAGNOSIS — R74.01 ELEVATION OF LEVELS OF LIVER TRANSAMINASE LEVELS: ICD-10-CM

## 2024-06-03 DIAGNOSIS — Z86.2 PERSONAL HISTORY OF DISEASES OF THE BLOOD AND BLOOD-FORMING ORGANS AND CERTAIN DISORDERS INVOLVING THE IMMUNE MECHANISM: ICD-10-CM

## 2024-06-03 PROCEDURE — 99203 OFFICE O/P NEW LOW 30 MIN: CPT

## 2024-06-03 RX ORDER — SODIUM SULFATE, POTASSIUM SULFATE AND MAGNESIUM SULFATE 1.6; 3.13; 17.5 G/177ML; G/177ML; G/177ML
17.5-3.13-1.6 SOLUTION ORAL
Qty: 2 | Refills: 0 | Status: ACTIVE | COMMUNITY
Start: 2024-06-03 | End: 1900-01-01

## 2024-06-03 NOTE — PHYSICAL EXAM
[Hearing Threshold Finger Rub Not [place]] : hearing was normal [Normal Appearance] : the appearance of the neck was normal [Normal] : heart rate was normal and rhythm regular, normal S1 and S2, no murmurs [None] : no edema [Bowel Sounds] : normal bowel sounds [Abdomen Tenderness] : non-tender [No Masses] : no abdominal mass palpated [Abdomen Soft] : soft [Oriented To Time, Place, And Person] : oriented to person, place, and time

## 2024-06-04 NOTE — HISTORY OF PRESENT ILLNESS
[FreeTextEntry1] : Pleasant 33-year-old woman with a FH of colon cancer (paternal grandfather), a FH of colon polyps (father and brother), a Hx of breast cancer (~ 2 years ago; s/p R mastectomy and NICOLLE flap reconstruction; on nerlynx), elevated ALT, and anemia presents for a screening colonoscopy consult. Reports diarrhea with nerlynx; on daily Lomotil and Imodium prn. Reports intermittent BRBPR (i.e. ~2 years ago). Reports no abdominal pain, nausea/vomiting, constipation, reflux/heartburn, nor dysphagia.

## 2024-06-04 NOTE — REASON FOR VISIT
[Initial Evaluation] : an initial evaluation [FreeTextEntry1] : screening colonoscopy consult, Hx of breast cancer, intermittent BRBPR

## 2024-06-04 NOTE — ASSESSMENT
[FreeTextEntry1] : Pleasant 33-year-old woman with a FH of colon cancer (paternal grandfather), a FH of colon polyps (father and brother), a Hx of breast cancer (~ 2 years ago; s/p R mastectomy and NICOLLE flap reconstruction; on nerlynx), elevated ALT, and anemia presents for a screening colonoscopy consult. Reports diarrhea with nerlynx; on daily Lomotil and Imodium prn. Reports intermittent BRBPR (i.e. ~2 years ago). Reports no abdominal pain, nausea/vomiting, constipation, reflux/heartburn, nor dysphagia. Recommend that the pt schedule a screening colonoscopy; discussed with the pt to hold Lomotil and Imodium the day prior to and the morning of the procedure.   The patient will proceed with a screening colonoscopy. I explained to the patient the risks, alternatives and benefits to a colonoscopy. Risk including but not limited to bleeding, perforation, infection adverse medication reaction. Questions were answered. agreeable to proceed with the planned procedures.   The patient will hold NSAIDs and vitamins/supplements for 5 days prior. Otherwise continue medications as prescribed. The patient is not on diabetes medications or anticoagulation.    Patient seen and examined, overseeing documentation by Franny SEPULVEDA Will proceed with a screening colonoscopy. Hold Lomotil and Imodium the day prior to and the morning of the colonoscopy; otherwise, medications as usual. Reviewed and reconciled medications, allergies, PMHx, PSHx, SocHx, FMHx. Reviewed imaging, blood work, diagnostic testing, discussed with patient. Further recommendations pending results of above work-up and evaluation.  All questions answered Call with any questions or concerns Time spent before and after visit reviewing patient's chart

## 2024-06-12 ENCOUNTER — EMERGENCY (EMERGENCY)
Facility: HOSPITAL | Age: 34
LOS: 1 days | Discharge: HOME | End: 2024-06-12
Attending: EMERGENCY MEDICINE | Admitting: EMERGENCY MEDICINE
Payer: COMMERCIAL

## 2024-06-12 VITALS
HEART RATE: 90 BPM | DIASTOLIC BLOOD PRESSURE: 106 MMHG | HEIGHT: 66 IN | SYSTOLIC BLOOD PRESSURE: 147 MMHG | OXYGEN SATURATION: 99 % | WEIGHT: 198.42 LBS | TEMPERATURE: 98 F | RESPIRATION RATE: 16 BRPM

## 2024-06-12 DIAGNOSIS — Z90.11 ACQUIRED ABSENCE OF RIGHT BREAST AND NIPPLE: Chronic | ICD-10-CM

## 2024-06-12 DIAGNOSIS — Z98.890 OTHER SPECIFIED POSTPROCEDURAL STATES: Chronic | ICD-10-CM

## 2024-06-12 DIAGNOSIS — Z95.828 PRESENCE OF OTHER VASCULAR IMPLANTS AND GRAFTS: Chronic | ICD-10-CM

## 2024-06-12 DIAGNOSIS — Z90.89 ACQUIRED ABSENCE OF OTHER ORGANS: Chronic | ICD-10-CM

## 2024-06-12 DIAGNOSIS — Z90.722 ACQUIRED ABSENCE OF OVARIES, BILATERAL: Chronic | ICD-10-CM

## 2024-06-12 PROCEDURE — 99283 EMERGENCY DEPT VISIT LOW MDM: CPT

## 2024-06-12 PROCEDURE — 99282 EMERGENCY DEPT VISIT SF MDM: CPT

## 2024-06-12 NOTE — ED ADULT NURSE NOTE - NSFALLCONCLUSION_ED_ALL_ED
BRENDACARLI ABRRIOS    Patient Age: 6 year old   Refill request by: Pharmacy fax  Refill to be: [id number] - [place], [place] - [address]  AT [facility] [address]     Medication requested to be refilled:   methylphenidate (METADATE CD) 20 MG ER (CD) capsule 30 capsule 0 6/22/2022     Sig - Route: Take 1 capsule by mouth every morning. - Oral      No scheduled appointment told to follow up in 2 months    Patient's last appointment with this Provider was 6/22/22         WEIGHT AND HEIGHT:   Wt Readings from Last 1 Encounters:   07/13/22 29.4 kg (64 lb 12.8 oz) (78 %, Z= 0.78)*     * Growth percentiles are based on CDC (Girls, 2-20 Years) data.     Ht Readings from Last 1 Encounters:   07/13/22 4' 4\" (1.321 m) (79 %, Z= 0.82)*     * Growth percentiles are based on CDC (Girls, 2-20 Years) data.     BMI Readings from Last 1 Encounters:   07/13/22 16.85 kg/m² (70 %, Z= 0.52)*     * Growth percentiles are based on CDC (Girls, 2-20 Years) data.       ALLERGIES:  Patient has no known allergies.  Current Outpatient Medications   Medication Sig Dispense Refill   • methylphenidate (METADATE CD) 20 MG ER (CD) capsule Take 1 capsule by mouth every morning. 30 capsule 0   • methylpheniDATE (Ritalin) 10 MG tablet Take 1 tab in the afternoon as needed 30 tablet 0   • prednisoLONE sod-phos (ORAPRED) 15 MG/5ML oral solution Start orapred 15 mg/5ml as follows: 30 mg (10 ml) po qd x 3 - 5 days as asthma symptoms dictate. 50 mL 0   • albuterol (ProAir HFA) 108 (90 Base) MCG/ACT inhaler Inhale 2 puffs into the lungs every 4 hours as needed for Shortness of Breath, Wheezing or Other (Cough). 1 each 0   • fluticasone-salmeterol (ADVAIR HFA) 115-21 MCG/ACT inhaler Inhale 1 puff into the lungs 2 times daily. May increase to 2 puffs twice daily for colds and illnesses. 3 each 1   • ipratropium (ATROVENT) 0.06 % nasal spray Spray 1 spray in each nostril every 6 hours as needed for Rhinitis. 15 mL 3   • Spacer/Aero-Hold  Chamber Mask Misc Please dispense the spacer and mask best covered by insurance. 1 Device 1   • ibuprofen (MOTRIN,ADVIL) 100 MG/5ML suspension Take 150 mg by mouth. - Oral     • triamcinolone (ARISTOCORT) 0.1 % ointment Apply twice daily as needed up to 2 weeks consecutively in non-sensitive, affected skin areas. 30 g 0     No current facility-administered medications for this visit.       ROUTING: Patient's physician/staff        PCP: Carmelo Tang MD         INS: Payor: BLUE CROSS BLUE [place]ELD [place] / Plan: PPO [id number] / Product Type: PPO MISC   PATIENT ADDRESS:  [address] [place] [id number]     Universal Safety Interventions

## 2024-06-12 NOTE — ED ADULT NURSE NOTE - BIRTH SEX
"Chief Complaint   Patient presents with    RECHECK     After seeing Dr Cedillo       Initial /82   Pulse 105   Temp 97.2  F (36.2  C) (Temporal)   Resp 16   Wt 81.1 kg (178 lb 12.8 oz)   SpO2 93%   BMI 28.00 kg/m   Estimated body mass index is 28 kg/m  as calculated from the following:    Height as of 4/8/24: 1.702 m (5' 7\").    Weight as of this encounter: 81.1 kg (178 lb 12.8 oz).  Medication Review: complete    The next two questions are to help us understand your food security.  If you are feeling you need any assistance in this area, we have resources available to support you today.          1/31/2024   SDOH- Food Insecurity   Within the past 12 months, did you worry that your food would run out before you got money to buy more? N   Within the past 12 months, did the food you bought just not last and you didn t have money to get more? N         Health Care Directive:  Patient does not have a Health Care Directive or Living Will: Discussed advance care planning with patient; however, patient declined at this time.    Leanne Hargrove LPN      " Female

## 2024-06-12 NOTE — ED ADULT TRIAGE NOTE - CHIEF COMPLAINT QUOTE
pt with Hx of bilateral mastectomy in june 2023 and reconstruction in november 2023 and pt feels burning sensation today

## 2024-06-12 NOTE — ED PROVIDER NOTE - CLINICAL SUMMARY MEDICAL DECISION MAKING FREE TEXT BOX
MARY: Pt seen with ACP, 33-year-old female with history of breast cancer status post bilateral mastectomy, chemo, radiation, breast reconstruction surgery with flap presents to the ED complaining of burning sensation in her right breast for 1 day.  Patient was concerned because she had similar symptoms when she was diagnosed with breast cancer 2 years ago. Patient has no sensation to breast after reconstructive surgery. Denies fever, chills, chest pain, shortness of breath, palpitations, syncope, nausea, vomiting, upper or lower extremity weakness or paresthesias. Patient requesting imaging to r/o reoccurrence of breast cancer.    Vitals reviewed, Head: atraumatic, PERRLA, EOMI, moist mucous membranes, Neck: supple, Chest: + surgical flap, no obvious erythema, lesion, tenderness, Lungs: clear to ausculation, Cardiac: regular rate and rhythm, Abdomen: soft and nontender, Extremities: well perfused and without edema, Skin: intact, no visible rash, Psych: appropriate mood, Neuro: AxOx3, no focal neurologic deficit     No obvious source of infection, extensive conversation with patient, patient will follow with her Oncology team to obtain proper imaging and follow up, pt in agreement

## 2024-06-12 NOTE — ED PROVIDER NOTE - OBJECTIVE STATEMENT
33-year-old female with history of breast cancer status post bilateral mastectomy, chemo, radiation, breast reconstruction surgery with flap presents to the ED complaining of burning sensation in her right breast for 1 day.  Patient was concerned because she had similar symptoms when she was diagnosed with breast cancer 2 years ago. Patient has no sensation to breast after reconstructive surgery. Denies fever, chills, chest pain, shortness of breath, palpitations, syncope, nausea, vomiting, upper or lower extremity weakness or paresthesias. Patient requesting imaging to r/o reoccurrence of breast cancer.

## 2024-06-12 NOTE — ED ADULT NURSE NOTE - CAS TRG GEN SKIN COLOR
[facility] ONCOLOGY  Hematology/Oncology Clinic Established Patient Follow Up Note     Patient: Elzbieta Gallegos Age: 58 year old  MRN: [id number]      Date of Visit: March 28, 2022     Chief Complaint:   Chief Complaint   Patient presents with   • Office Visit   • Lung Cancer       Cancer History:  Cancer Staging  Primary lung adenocarcinoma, unspecified laterality (CMS/HCC)  Staging form: Lung, AJCC 8th Edition  - Clinical stage from 3/22/2022: Stage IVB (cT2b, cN3, pM1c) - Signed by Indu Avilez DO on 3/28/2022      Oncology History   Primary lung adenocarcinoma, unspecified laterality (CMS/HCC)   3/22/2022 -  Cancer Staged    Staging form: Lung, AJCC 8th Edition  - Clinical stage from 3/22/2022: Stage IVB (cT2b, cN3, pM1c) - Signed by Indu Avilez DO on 3/28/2022       3/28/2022 Initial Diagnosis    Primary lung adenocarcinoma, unspecified laterality (CMS/HCC)     4/1/2022 -  Chemotherapy    PEMEtrexed (ALIMTA) 800 mg in sodium chloride 0.9 % 100 mL chemo infusion, 500 mg/m2 = 800 mg, Intravenous, ONCE, 1 of 38 cycles          SUBJECTIVE     HPI:  Elzbieta Gallegos is a 58 year old female who presents for evaluation of the above complaint.     Patient presents for evaluation of bilateral lung masses. She is accompanied by her  to this visit. Patient was first evaluated inpatient at [facility] when she presented for worsening SOB. She had thoracentesis done 2/2 large R pleural effusion. Cytology was negative. CTA was done on admission, showing bilateral lung masses with mediastinal and hilar lymphadenopathy. She has met with pulmonary, but as cytology was negative, she does not yet have a diagnosis. She was having significant SOB at pulmonary visit and was emergently sent for a second thoracentesis. PET scan is not yet done. She presents today, feeling okay, but having pain with inspiration in the sides of her chest and her back. She states thoracentesis site also hurts.  No wheezing, does get worsening  SOB with exertion. Cough is present, mild, no blood in sputum. No other symptoms at this time.    Subjective: 3/21/22  Patient presents for interval follow-up.  Continues to be short of breath, cough still present.  Does not feel that the shortness of breath is as bad as it was last week when she is out pulmonary office, but she is having significant trouble when she gets up to move around.  Biopsy pending.    Subjective: 3/28/22  Patient presents for follow up. Is s/p bx, pathology showing lung adenocarcinoma. NGS and PD-L1 pending. Patient has SOB, and worsening right side chest pain.     Past Medical History:   Diagnosis Date   • Anxiety    • Coughing     with traces of blood   • Depression    • Lung nodule    • Pleural effusion    • Shortness of breath    • Shortness of breath         Past Surgical History:   Procedure Laterality Date   • Thoracentesis          ALLERGIES:   Allergen Reactions   • Amoxicillin-Pot Clavulanate RASH     PER .   • Contrast Media RASH   • Mold   (Environmental) RASH     Allergy to weeds, plants   • Trace Metals Other (See Comments)   • Unknown Other (See Comments)     Spicy foods and too much sunlight        Current Outpatient Medications   Medication Sig Dispense Refill   • PROTEIN PO Protein drinks     • ID Now COVID-19 Kit AS DIRECTED     • COVID-19 Specimen Collection Kit TEST AS DIRECTED TODAY     • NON FORMULARY PROTEIN SHAKES WITH VITAMINS     • umeclidinium-vilanterol (Anoro Ellipta) 62.5-25 MCG/INH inhaler Inhale 1 puff into the lungs daily.     • prochlorperazine (COMPAZINE) 10 MG tablet Take 1 tablet by mouth every 6 hours as needed for Nausea or Vomiting. 30 tablet 5   • dexAMETHasone (DECADRON) 4 MG tablet Take 1 tablet by mouth 2 times daily. Take for 3 days with each chemotherapy cycle. Start the day before each pemetrexed treatment. 30 tablet 0   • folic acid (FOLATE) 1 MG tablet Take 1 tablet by mouth daily. Start 7 days prior to the first dose of pemetrexed.  Continue until 21 days after last dose of pemetrexed. 30 tablet 11   • HYDROcodone-acetaminophen (NORCO) 7.5-325 MG per tablet Take 2 tablets by mouth every 4 hours as needed for Pain. 100 tablet 0   • mirtazapine (REMERON) 7.5 MG tablet Take 1 tablet by mouth nightly. 30 tablet 3   • ibuprofen (MOTRIN) 200 MG tablet Take 200 mg by mouth every 6 hours as needed for Pain.     • hydrOXYzine (VISTARIL) 25 MG capsule Take 1 capsule by mouth 3 times daily as needed for Itching. 15 capsule 0   • hydroCORTisone (CORTIZONE) 1 % cream Apply topically 2 times daily. 30 g 0     No current facility-administered medications for this visit.     Facility-Administered Medications Ordered in Other Visits   Medication Dose Route Frequency Provider Last Rate Last Admin   • sodium chloride 0.9 % flush bag 250 mL  250 mL Intravenous Once PRN Halina Martinez PA-C            Social History     Socioeconomic History   • Marital status: /Civil Union     Spouse name: Not on file   • Number of children: Not on file   • Years of education: Not on file   • Highest education level: Not on file   Occupational History   • Occupation: Not working   Tobacco Use   • Smoking status: Current Every Day Smoker     Years: 40.00     Types: Cigarettes   • Smokeless tobacco: Never Used   • Tobacco comment: 5 cigarretes per day    Vaping Use   • Vaping Use: Former   • Substances: Nicotine   • Devices: Refillable tank   Substance and Sexual Activity   • Alcohol use: Never   • Drug use: Never   • Sexual activity: Not on file   Other Topics Concern   • Not on file   Social History Narrative   • Not on file     Social Determinants of Health     Financial Resource Strain: Not on file   Food Insecurity: Not on file   Transportation Needs: Not on file   Physical Activity: Not on file   Stress: Not on file   Social Connections: Not on file   Intimate Partner Violence: Not At Risk   • Social Determinants: Intimate Partner Violence Past Fear: No   • Social  Determinants: Intimate Partner Violence Current Fear: No        No family history on file.     OB History   No obstetric history on file.        Review of Systems:  A 10 point review of systems was conducted and is negative unless mentioned in HPI.    OBJECTIVE    Physical Examination    Performance Status:   ECOG [03/28/22 1151]   ECOG Performance Status 1     Visit Vitals  /78 (BP Location: RUE - Right upper extremity, Patient Position: Sitting, Cuff Size: Regular)   Pulse (!) 117   Temp 99.7 °F (37.6 °C)   Ht 5' 3\" (1.6 m)   Wt 63.8 kg (140 lb 9.6 oz)   SpO2 90%   BMI 24.91 kg/m²     General: awake, alert, oriented, NAD, appears staged age  HEENT: normocephalic, without obvious abnormality, conjunctiva clear bilaterally, no scleral icterus, oropharynx clear without exudates  Lungs: Decreased breath sounds bilaterally, right worse than left, diminished right lung sounds posteriorly in base  Heart: regular rate and rhythm, S1 and S2 normal, no murmur/rubs/gallops  Abdomen: soft, non-tender, non-distended, NABS, no palpable organomegaly  Extremities: no clubbing, cyanosis, edema  Musculoskeletal: symmetrical strength and tone, full active and passive ROM  Skin: skin color, texture, and turgor normal, no visible rashes or lesions  Neurologic: oriented x 3, no focal deficits  Psych: mood and affect normal, good insight    Laboratory/Imaging    Hospital Outpatient Visit on 03/22/2022   Component Date Value   • Case Report 03/22/2022                      Value:Surgical Pathology                                Case: [id number]                                  Authorizing Provider:  Indu Avilez DO          Collected:           03/22/2022 1106              Ordering Location:     [facility]     Received:            03/22/2022 1233                                     IMAGING CT SCAN                                                              Pathologist:           Светлана Slaughter MD                                                       Specimen:    Lung, Right Lower Lobe, right lung mass                                                   • Addendum: IHC Prognostic* 03/22/2022                      Value:This result contains rich text formatting which cannot be displayed here.   • Pathologic Diagnosis 03/22/2022                      Value:This result contains rich text formatting which cannot be displayed here.   • Comment 03/22/2022                      Value:This result contains rich text formatting which cannot be displayed here.   • Clinical Information 03/22/2022                      Value:This result contains rich text formatting which cannot be displayed here.   • Gross Description 03/22/2022                      Value:This result contains rich text formatting which cannot be displayed here.   • Disclaimer 03/22/2022                      Value:This result contains rich text formatting which cannot be displayed here.   • Case Report 03/22/2022                      Value:Non-Gynecolgical Cytology                         Case: [id number]                                  Authorizing Provider:  Indu Avilez DO          Collected:           03/22/2022 1110              Ordering Location:     [facility]     Received:            03/22/2022 1235                                     IMAGING CT SCAN                                                              Pathologist:           Светлана Slaughter MD                                                      Specimens:   A) - Lung, Right Lower Lobe, thoracentesis                                                          B) - Lung, Right Lower Lobe                                                               • Cytologic Interpretation 03/22/2022                      Value:This result contains rich text formatting which cannot be displayed here.   • Clinical Information 03/22/2022                      Value:This result contains rich text formatting which  Normal for race cannot be displayed here.   • Gross Description 03/22/2022                      Value:This result contains rich text formatting which cannot be displayed here.   • Disclaimer 03/22/2022                      Value:This result contains rich text formatting which cannot be displayed here.   Hospital Outpatient Visit on 03/22/2022   Component Date Value   • Activated Clotting Time 03/22/2022 1.2    Lab Services on 03/20/2022   Component Date Value   • SARS-CoV-2 by PCR 03/20/2022 Not Detected    • Isolation Guidelines 03/20/2022                      Value:This result contains rich text formatting which cannot be displayed here.   • Procedural Notes 03/20/2022                      Value:This result contains rich text formatting which cannot be displayed here.   Hospital Outpatient Visit on 03/18/2022   Component Date Value   • GLUCOSE, BEDSIDE - POINT* 03/18/2022 124 (A)   Hospital Outpatient Visit on 03/11/2022   Component Date Value   • Activated Clotting Time 03/11/2022 1.0    • Rapid SARS-COV-2 by PCR 03/11/2022 Not Detected    • Isolation Guidelines 03/11/2022     • Procedural Comment 03/11/2022     Admission on 03/04/2022, Discharged on 03/05/2022   Component Date Value   • Ventricular Rate EKG/Min* 03/04/2022 148    • Atrial Rate (BPM) 03/04/2022 148    • LA-Interval (MSEC) 03/04/2022 152    • QRS-Interval (MSEC) 03/04/2022 60    • QT-Interval (MSEC) 03/04/2022 252    • QTc 03/04/2022 395    • P Axis (Degrees) 03/04/2022 49    • R Axis (Degrees) 03/04/2022 114    • T Axis (Degrees) 03/04/2022 65    • REPORT TEXT 03/04/2022                      Value:Sinus tachycardia  Possible  Left atrial enlargement  Indeterminate axis  Borderline ECG  No previous ECGs available  Confirmed by JANETTE VALENCIA MD ([id number]) on 3/6/2022 10:17:33 AM     • Sodium 03/04/2022 141    • Potassium 03/04/2022 3.9    • Chloride 03/04/2022 104    • Carbon Dioxide 03/04/2022 27    • Anion Gap 03/04/2022 14    • Glucose 03/04/2022 153 (A)   •  BUN 03/04/2022 13    • Creatinine 03/04/2022 0.67    • Glomerular Filtration Ra* 03/04/2022 >90    • BUN/ Creatinine Ratio 03/04/2022 19    • Calcium 03/04/2022 8.9    • Bilirubin, Total 03/04/2022 0.3    • GOT/AST 03/04/2022 16    • GPT/ALT 03/04/2022 29    • Alkaline Phosphatase 03/04/2022 71    • Albumin 03/04/2022 2.8 (A)   • Protein, Total 03/04/2022 7.3    • Globulin 03/04/2022 4.5 (A)   • A/G Ratio 03/04/2022 0.6 (A)   • Magnesium 03/04/2022 2.0    • Troponin I, High Sensiti* 03/04/2022 26    • Prothrombin Time 03/04/2022 11.3    • INR 03/04/2022 1.1    • PTT 03/04/2022 30    • D Dimer, Quantitative 03/04/2022 11.14 (A)   • WBC 03/04/2022 14.9 (A)   • RBC 03/04/2022 4.73    • HGB 03/04/2022 14.0    • HCT 03/04/2022 42.5    • MCV 03/04/2022 89.9    • MCH 03/04/2022 29.6    • MCHC 03/04/2022 32.9    • RDW-CV 03/04/2022 12.8    • RDW-SD 03/04/2022 42.5    • PLT 03/04/2022 490 (A)   • NRBC 03/04/2022 0    • Neutrophil, Percent 03/04/2022 78    • Lymphocytes, Percent 03/04/2022 13    • Mono, Percent 03/04/2022 6    • Eosinophils, Percent 03/04/2022 2    • Basophils, Percent 03/04/2022 0    • Immature Granulocytes 03/04/2022 1    • Absolute Neutrophils 03/04/2022 11.6 (A)   • Absolute Lymphocytes 03/04/2022 2.0    • Absolute Monocytes 03/04/2022 0.9    • Absolute Eosinophils  03/04/2022 0.3    • Absolute Basophils 03/04/2022 0.1    • Absolute Immmature Granu* 03/04/2022 0.1    • Rapid SARS-COV-2 by PCR 03/04/2022 Not Detected    • Influenza A by PCR 03/04/2022 Not Detected    • Influenza B by PCR 03/04/2022 Not Detected    • RSV BY PCR 03/04/2022 Not Detected    • Isolation Guidelines 03/04/2022     • Procedural Comment 03/04/2022     • Culture, Blood or Bone M* 03/04/2022 No Growth 5 Days.    • Culture, Blood or Bone M* 03/04/2022 No Growth 5 Days.    • Lactate, Venous 03/04/2022 1.5    • Extra Tube 03/04/2022 Hold for Add Ons    • Glucose, Fluid 03/04/2022 113    • LDH, Fluid 03/04/2022 894 (A)   • Total Protein,  Fluid 03/04/2022 4.7 (A)   • CULTURE WITH GRAM STAIN,* 03/04/2022 No Growth 4 Days.    • Gram Stain 03/04/2022 Present Polymorphonuclear cells.    • Gram Stain 03/04/2022 No epithelial cells seen.    • Gram Stain 03/04/2022 No organisms seen.    • Gram Stain 03/04/2022 Slide prepared by Cytospin.    • Mycobacteria Culture and* 03/04/2022 No Growth 21 Days.    • ACID FAST SMEAR 03/04/2022 No acid fast bacilli seen.    • Case Report 03/04/2022                      Value:Non-Gynecolgical Cytology                         Case: [id number]                                  Authorizing Provider:  Drew Valdes MD          Collected:           03/04/2022 1132              Ordering Location:     [facility]     Received:            03/04/2022 1538                                     EMERGENCY                                                                    Pathologist:           Hernandez Mcqueen MD                                                             Specimen:    Pleural Cavity, Right, pleural cavity right                                               • Cytologic Interpretation 03/04/2022                      Value:This result contains rich text formatting which cannot be displayed here.   • Clinical Information 03/04/2022                      Value:This result contains rich text formatting which cannot be displayed here.   • Gross Description 03/04/2022                      Value:This result contains rich text formatting which cannot be displayed here.   • Disclaimer 03/04/2022                      Value:This result contains rich text formatting which cannot be displayed here.   • Fluid Color 03/04/2022 Red    • Fluid Clarity 03/04/2022 Turbid    • Fluid Volume 03/04/2022 70    • Total Nucleated Cells 03/04/2022 3,977 (A)   • Remarks, Fluid 03/04/2022 Examination of Cytospin preparations confirms findings of WBC count.     • LD, Total 03/04/2022 478 (A)   • Protein, Total 03/04/2022 7.4    • Neutrophils %,  Fluid 03/04/2022 27    • Lymphocytes %, Fluid 03/04/2022 67    • Mono/Macrophages %, Fluid 03/04/2022 4    • Eosinophils %, Fluid 03/04/2022 2    • Total Cells Counted, Flu* 03/04/2022 100    • Sodium 03/05/2022 143    • Potassium 03/05/2022 4.7    • Chloride 03/05/2022 106    • Carbon Dioxide 03/05/2022 26    • Anion Gap 03/05/2022 16    • Glucose 03/05/2022 113 (A)   • BUN 03/05/2022 14    • Creatinine 03/05/2022 0.72    • Glomerular Filtration Ra* 03/05/2022 >90    • BUN/ Creatinine Ratio 03/05/2022 19    • Calcium 03/05/2022 8.7    • WBC 03/05/2022 12.9 (A)   • RBC 03/05/2022 4.69    • HGB 03/05/2022 13.5    • HCT 03/05/2022 41.7    • MCV 03/05/2022 88.9    • MCH 03/05/2022 28.8    • MCHC 03/05/2022 32.4    • RDW-CV 03/05/2022 13.2    • RDW-SD 03/05/2022 42.5    • PLT 03/05/2022 440    • NRBC 03/05/2022 0    • Neutrophil, Percent 03/05/2022 71    • Lymphocytes, Percent 03/05/2022 19    • Mono, Percent 03/05/2022 6    • Eosinophils, Percent 03/05/2022 3    • Basophils, Percent 03/05/2022 0    • Immature Granulocytes 03/05/2022 1    • Absolute Neutrophils 03/05/2022 9.2 (A)   • Absolute Lymphocytes 03/05/2022 2.5    • Absolute Monocytes 03/05/2022 0.8    • Absolute Eosinophils  03/05/2022 0.4    • Absolute Basophils 03/05/2022 0.1    • Absolute Immmature Granu* 03/05/2022 0.1        CT GUIDED LUNG BIOPSY RIGHT, US GUIDED THORACENTESIS  Narrative: PROCEDURE: CT GUIDED LUNG BIOPSY RIGHT, US GUIDED THORACENTESIS    INTERVENTIONALIST: Bridget Lomeli M.D.  ASSISTANT(S): None    CLINICAL HISTORY:  PREPROCEDURE DIAGNOSIS: Right lung mass, pleural effusion  INDICATION: 58 years old Female with right lung mass, pleural effusion.  POST PROCEDURE DIAGNOSIS: Same  Impression: Successful ultrasound-guided right thoracentesis yielding  approximately 300 mL rust-colored fluid. Successful right lung mass biopsy.    PLAN: Follow-up chest  radiographs.  ___________________________________________________________________________  _____________________________________    PROCEDURES PERFORMED:     1. Ultrasound-guided right thoracentesis.  2. CT-guided right lung mass biopsy.    ANESTHESIA/SEDATION TIME: 30 minutes    PROPHYLACTIC ANTIBIOTIC: None    PREPARATION: The site was prepared and draped and all elements of maximal  sterile barrier technique including sterile gloves, sterile gown, mask,  large sterile sheet, hand hygiene and cutaneous antisepsis with 2%  chlorhexidine +70% isopropyl alcohol were used.    PROCEDURE:   Verbal and written consent was obtained. The patient was seated upright  along side of the CT table, and limited grayscale imaging of the posterior  right chest was performed using a curved transducer. A loculated fluid was  seen along the posterior right chest pleura. The overlying skin was then  prepped and draped in standard sterile fashion. All elements of maximum  sterile barrier were maintained throughout the procedure. A \"timeout\" was  called prior to initiating the procedure. A 5 Brazilian Yueh needle was guided  into the right chest pleural effusions and real-time ultrasound guidance.  Approximately 50 mL clear, rust-colored fluid was aspirated and collected  in a sterile container. The sheath was then connected to a Vacutainer vial  through which an additional 250 mL pleural fluid was aspirated. Upon  completion, the sheath was removed, and a Band-Aid applied to the skin  puncture site. The patient tolerated the procedure well. There were no  immediate complications.     Attention was next turned to the right lung mass biopsy. The patient was  positioned in the left lateral decubitus position on the CT table, and  limited noncontrast scans of the chest were performed, identifying the  right lung mass. Radiopaque grid markers were placed, and the area  rescanned, identifying an appropriate skin access site. The skin site  was  marked, and the area prepped and draped in standard sterile fashion. All  elements of maximum sterile barrier were maintained throughout the  procedure. A \"timeout\" was called prior to initiating the procedure. An  angiography nurse was present to monitor the patient's cardiopulmonary  status and provide conscious sedation using IV fentanyl and Versed. Please  refer to nurses notes for sedation details. 1% lidocaine was used for local  anesthesia. A 19-gauge coaxial needles advanced into the right lung mass  under CT guidance. Once needle positioning was confirmed, multiple core  biopsy specimens were obtained using 20-gauge coring needle. The specimens  were placed in formalin. The needle was then removed, and the area  rescanned. The patient tolerated the procedure well. There were no  immediate complications.    FINDINGS:    Limited grayscale sonographic imaging of the posterior right chest  demonstrates a small loculated pleural collection.. Limited noncontrast  scans of the chest demonstrate an irregular cavitating mass within the  right lower lobe. Postbiopsy scans demonstrate no evidence of hematoma.    COMPLICATIONS: None    SPECIMENS REMOVED: Approximately 300 mL right pleural fluid. Right lung  mass biopsy specimens    ESTIMATED BLOOD LOSS: Minimal    Electronically Signed by: RENETTA CALVILLO MD   Signed on: 3/22/2022 4:13 PM   XR CHEST PA OR AP INSPIRATION W EXPIRATION 2 VIEWS  Narrative: EXAM: CHEST 2 VIEWS INSPIRATION EXPIRATION    CLINICAL INDICATION: Status post right lung biopsy.    COMPARISON: Chest 3/22/2020 2/11/2025    FINDINGS:  Cardiomediastinal silhouette within normal limits.   Lung fields appear fully expanded and free of active infiltrates. Multiple  peripheral airspace opacities throughout the right lung unchanged from  previous consistent with underlying mass versus loculated effusion. Left  hilar mass stable  Small right pleural effusion suspected, unchanged.  No  pneumothorax  Impression: No pneumothorax.    Electronically Signed by: ADRYAN BROWN MD   Signed on: 3/22/2022 1:38 PM   XR CHEST PA OR AP INSPIRATION W EXPIRATION 2 VIEWS  Narrative: EXAM: XR CHEST PA OR AP INSPIRATION W EXPIRATION 2 VIEWS    CLINICAL INDICATION: Postprocedure.    COMPARISON: 03/11/2022.  CT earlier today.  Impression: FINDINGS AND IMPRESSION:     There is no pneumothorax.  Multiple lobulated airspace opacities  throughout the right lung likely lung lesions and likely to fluid  collections.  Blunting of the right concentric angle secondary to pleural  fluid likely loculated.  Left hilar mass redemonstrated.  Heart size  partially obscured normal.    Electronically Signed by: YUE FOSTER M.D.   Signed on: 3/22/2022 11:49 AM         ASSESSMENT/PLAN    # Diagnosis  1. Lung cancer    #lung adenocarcinoma, stage IVB ([id number])  -CTA done, showing RUL central cavitary mass, 4.6x2.7 cm, along with L hilar mass, 4.2x3.6 cm. Both masses are associated with enlarged superior mediastinal, prevascular, paratracheal, and subcarinal lymph nodes, up to 1.8 cm in short axis. There is also bilateral hilar LAD and several satellite nodules. L hilar mass is encasing bronchovascular structures. PET scan done, showing significant disease in both lungs, along with large right pleural effusion.  There is no disease below the diaphragm.  No osseous disease. S/p bx, showing moderately differentiated pulmonary adenocarcinoma, acinar and solid patterns, and associated extensive necrosis. Cytology on fluid was also positive as below.  -counseled patient that lung cancer in the metastatic setting is treated with either a combination of chemotherapy and immunotherapy, immunotherapy on its own, or targeted therapy. Unfortunately, she has a large burden of disease and will need expedited therapy. PD-L1 and NGS are not yet back. Would not wait to treat patient at this time.    #R pleural effusion, recurrent,  malignant  -likely malignant. Was tapped x 2, cytology on first specimen negative. Second cytology was not sent.  -had thoracentesis during lung biopsy, cell block made, which was positive for adenocarcinoma.    #cancer related pain  -worst in R hemithorax.  -both Tylenol 3 and Norco were ineffective. Both therapies discontinued.  -will start long acting morphine.    Plan:  -at this time, as patient continues to have symptoms and recurring effusion, recommend initiation of chemoimmunotherapy with carboplatin/pemetexed/pembrolizumab. Will consider de-escalation of therapy vs targeted therapy once results are available. Patient amenable to start therapy. Orders placed, will obtain auth.   -will order repeat thoracentesis as needed.  -will need MRI brain to complete staging, scheduled, but not yet done.  -will start MSER for pain, 15 mg PO BID.  -Case discussed with pulmonary.  Continue inhalers at present, consider Pleurx in the future should fluid reaccumulate.  -will need chemo teach.  -C1 labs to be done at teach.    Follow Up: RTC later this week for C1 chemotherapy.     All of the patient's questions were answered to their satisfaction. They will return to clinic as above.  Patient verbalized she does not want a  for this appt today.    Indu Avilez, DO  [facility]    Note to patient: The 21st Century Cures Act makes medical notes like these available to patients in the interest of transparency. However, be advised this is a medical document. It is intended as peer to peer communication. It is written in medical language and may contain abbreviations or verbiage that are unfamiliar. It may appear blunt or direct. Medical documents are intended to carry relevant information, facts as evident, and the clinical opinion of the practitioner.

## 2024-06-12 NOTE — ED ADULT NURSE NOTE - OBJECTIVE STATEMENT
Presents to ED with complaints of Rt breast burning sensation started today, Hx breast Ca, denies any trauma. aox4 no s/s of distress. awaiting MD resendez

## 2024-06-12 NOTE — ED PROVIDER NOTE - PROGRESS NOTE DETAILS
Long conversation with patient explaining that we are unable to do the right studies to rule out recurrent.  Patient has good follow-up with her surgeon and oncologist and will call tomorrow morning to get the appropriate imaging set up.

## 2024-06-12 NOTE — ED PROVIDER NOTE - ATTENDING APP SHARED VISIT CONTRIBUTION OF CARE
Attending MD Cuenca;:   I personally have seen and examined this patient.  Physician assistant note reviewed and agree on plan of care and except where noted.  See MDM for details.

## 2024-06-12 NOTE — ED PROVIDER NOTE - NEUROLOGICAL, MLM
Alert and oriented, no focal deficits, no motor or sensory deficits. baseline decreased sensation to bilateral breast

## 2024-06-12 NOTE — ED PROVIDER NOTE - PATIENT PORTAL LINK FT
You can access the FollowMyHealth Patient Portal offered by [facility] by registering at the following website: [email or web address]. By joining [employer]’s FollowMyHealth portal, you will also be able to view your health information using other applications (apps) compatible with our system.

## 2024-06-12 NOTE — ED PROVIDER NOTE - SKIN, MLM
Skin intact. No evidence of rash to breast. Breast exam: no palpable mass, no lymphadenopathy, no rash

## 2024-06-13 RX ORDER — VENLAFAXINE HYDROCHLORIDE 150 MG/1
150 CAPSULE, EXTENDED RELEASE ORAL DAILY
Qty: 90 | Refills: 1 | Status: ACTIVE | COMMUNITY
Start: 2023-07-26 | End: 1900-01-01

## 2024-06-13 RX ORDER — NERATINIB 40 MG/1
40 TABLET ORAL DAILY
Qty: 180 | Refills: 0 | Status: ACTIVE | COMMUNITY
Start: 2023-12-01 | End: 1900-01-01

## 2024-06-14 ENCOUNTER — RESULT REVIEW (OUTPATIENT)
Age: 34
End: 2024-06-14

## 2024-06-14 ENCOUNTER — APPOINTMENT (OUTPATIENT)
Dept: ULTRASOUND IMAGING | Facility: CLINIC | Age: 34
End: 2024-06-14

## 2024-06-14 PROCEDURE — 76641 ULTRASOUND BREAST COMPLETE: CPT | Mod: RT

## 2024-06-19 ENCOUNTER — APPOINTMENT (OUTPATIENT)
Dept: HEMATOLOGY ONCOLOGY | Facility: CLINIC | Age: 34
End: 2024-06-19
Payer: COMMERCIAL

## 2024-06-19 VITALS
SYSTOLIC BLOOD PRESSURE: 124 MMHG | BODY MASS INDEX: 32.28 KG/M2 | WEIGHT: 200 LBS | DIASTOLIC BLOOD PRESSURE: 90 MMHG | RESPIRATION RATE: 16 BRPM | TEMPERATURE: 97.8 F | OXYGEN SATURATION: 97 % | HEART RATE: 103 BPM

## 2024-06-19 DIAGNOSIS — K52.1 TOXIC GASTROENTERITIS AND COLITIS: ICD-10-CM

## 2024-06-19 DIAGNOSIS — C50.919 MALIGNANT NEOPLASM OF UNSPECIFIED SITE OF UNSPECIFIED FEMALE BREAST: ICD-10-CM

## 2024-06-19 DIAGNOSIS — T45.1X5A TOXIC GASTROENTERITIS AND COLITIS: ICD-10-CM

## 2024-06-19 PROCEDURE — 99214 OFFICE O/P EST MOD 30 MIN: CPT

## 2024-06-19 PROCEDURE — G2211 COMPLEX E/M VISIT ADD ON: CPT | Mod: NC

## 2024-06-20 PROBLEM — K52.1 CHEMOTHERAPY-INDUCED DIARRHEA: Status: ACTIVE | Noted: 2022-12-29

## 2024-06-20 RX ORDER — GABAPENTIN 300 MG/1
300 CAPSULE ORAL
Qty: 30 | Refills: 1 | Status: ACTIVE | COMMUNITY
Start: 2024-06-20 | End: 1900-01-01

## 2024-06-20 NOTE — HISTORY OF PRESENT ILLNESS
[de-identified] : Ms.Caitlyn Lui is a  31 year old female here for an evaluation of breast cancer. Her oncologic history is as follows:  Patient is 10 weeks and 5 days pregnant.  She had burning sensation in her breast last year and breast imaging on 11/3/2021 (BIRADS 2) showed no mammographic or sonographic evidence of malignancy. Pain went away.   She experienced right breast and axillary burning pain and noticed a right breast mass in April 2022. She underwent right breast US on 5/13/22 (BIRADS 4C) which showed a suspicious palpable mass in the right breast 9:00 and an additional suspicious mass in the right axillary tail, as well as axillary adenopathy.  She underwent right breast biopsy at 9:00 14cm FN, 11cmFn and right axilla biopsies on 5/14/2022 the right axillary tail at 9:00 14 cm FN, core biopsy showed Invasive poorly differentiated ductal carcinoma, [place] score 8/9, measuring 1.0 cm ER+ >90%, PA+ >90%, HER 2 POSITIVE.  No Ductal carcinoma in situ, microcalcifications or lymphovascular permeation noted. The right 9:00 11 cm FN, core biopsy revealed Invasive moderately differentiated ductal carcinoma, Lorraine score 7/9 measuring 0.9 cm, ER+ >90%, PA+ >90%, HER 2 POSITIVE.  Ductal carcinoma in situ, cribriform pattern with high nuclear grade and comedonecrosis noted, No microcalcifications or lymphovascular permeation noted The right axillary lymph node core biopsy was positive for metastasis measuring 1.0 cm.  She underwent Chest Xray and.US ABDOMEN  on 05/19/2022: Chest Xray MANI 05/19/2022 ABD US No stones seen within the gallbladder. Question of a tiny polyp versus fold within the gallbladder wall, Normal liver parenchyma No focal cystic or solid masses, Normal biliary ducts.  She is vaccinated against COVID-19.  She has a 21-month-old baby at home.  Previous pregnancy was not complicated, full-term delivery.  Patient underwent right mastectomy with Right axillary lymph node dissection on 6/21/2022. Pathology from right mastectomy showed multifocal moderate to poorly differentiated ductal carcinoma with chronic inflammatory infiltrate, measuring 5.5 cm and 2.3 cm.  High-grade DCIS was removed.  10 out of 16 lymph nodes were positive for metastatic disease (largest size 2.2 cm with extranodal extension)  pT3 pN3a.  Repeat markers requested.  Addendum showed ER/PA/HER2/sasha POSITIVE on both tumors  We discussed that her disease was way much more extensive than what was felt based on imaging.  She does report that her burning axillary pain has resolved.  She has postoperative pain which is manageable.  We discussed to proceed with dose dense ACT chemo in the second and third trimester of pregnancy.  Side effects related to patient as well as fetus were reviewed in detail.  Plan to give her 1 year of Herceptin and Perjeta after delivery.  She is likely also a candidate for neratinib.  Plan for PMR T after delivery.  Given multiple positive lymph nodes, she is at high risk for recurrence.  We will do CT chest abdomen pelvis and bone scan after delivery. She is at risk for lymphedema due to lymph node dissection and radiation.  She is referred to lymphedema therapy. She is being closely followed by MFM.  She is working from home  8/22/22: C1 AC, 19 weeks  Chemo s/e discussed Medication for symptom management reviewed and questions answered 7/29/22  ECHO LVEF 58% 05/19/2022 Chest Xray Lungs clear EKG done , Chemo consent obtained  left sided Medport in place, catheter tip  is located at the cavoatrial junction. LMP 3/7/2022 Patient is a anxious about stating chemo today Emotional support given  8/17/2022 Today She is 21 weeks 1 day ANDI 12/27/22 S/p C1 Tolerated well, here for AC  #2 today 8/17/2022 Counts good   11/4/22, taxol# 6, 33+ 3 weeks , ANDI 12/27/22 Plan for induction 12/5, last chemo 11/4 today CT scan, bone scan after delivery  THP starts 12/23 Plan discussed with MFM, OB, NICU in MDT meeting 11/3/22  She reports mild-moderate fatigue and altered taste after chemo. She was able to function, maintained PO intake, weight stable. She had mild nausea, took Reglan, no vomiting, no diarrhea or mouth sores. She had mild Bone pain from neulasta. No neuropathy, no fevers Baby moving well, monitored by MFM regularly . Normal fetal EKG and sono, no fetal wt issues Seen by rad onc 9/2022 BS and BP ok  12/23/22 Here for Taxol #7 and to start HP q 3 weeks x 17 doses. She delivered a healthy baby boy 12/9/22 6lbs 10 oz Energy and appetite back to baseline, occasional mild fatigue hair started growing back, no neuropathy  12/9/2022 CT CAP No evidence of metastatic disease in the thorax, abdomen, and pelvis. Bone scan ordered pending auth for scheduling  1/6/2023 Here for Taxol #9 Tolerating well she reports GI upset,and mild diarrhea 1-2 episodes occasionally. Imodium prn. Seen with Dr. Jason to discuss tx plan after completion of RT. Discussed use of tamoxifen vs AI + lupron, benefits of AI+Lupron, side effects , use of contraception and need to avoid pregnancy. Discussed that we would start Lupron for 6 months then consider BSO. Discussed the cons of early menopause such as increased risk of heart disease , osteoporosis and hyperlipidemia. We discussed the possibility of BL mastectomies as she is leaning toward mastectomy.  1/6/2023 Bone scan: No scan evidence of osseous metastasis. Will repeat SCANS at the completion of HP  1/120/23 Taxol HP # 11 She reports mild-moderate fatigue and altered taste x 3-4 days after chemo. She was able to function, maintained PO intake, weight stable. She had mild nausea, took Reglan, no vomiting,  mouth sores. No Bone pain, no neuropathy, no fevers. She had diarrhea day 5 x 1.5 days. Controlled with imodium   Pt will get RT after last taxol. We discussed use of tamoxifen vs AI + lupron, benefits of AI+Lupron, side effects , use of contraception and need to avoid pregnancy. Patient is young and with high risk disease. She is candidate for OS + AI based on SOFT/TEXT data. Discussed that we would start Lupron with last chemo, would do atleast for 6 months of lupron then consider BSO. Discussed the cons of early menopause such increased risk of heart disease , osteoporosis and hyperlipidemia.  We discussed the possibility of BL mastectomies as she is leaning toward mastectomies.  She is also a candidate for neratinib after HP due to high risk disease.  We will discuss this again per her request at next visit, she will bring spouse for discussion   CT CAP BONE scan MANI 12/2022. Will repeat SCANS at the completion of HP  Baby is doing well    2/2023 Chemo side effects are almost completely resolved. No neuropathy. Hair is growing back.  Appetite and energy levels are good.  She reports feeling anxious about her future, not feeling herself and worried about menopausal side effects.  She started seeing a therapist.referred by OB/GYNwhich she preferred. She is currently f/b psychologist for feelings of being overwhelmed.  She does not think that she is depressed and does not want to start antidepressant.  We will continue to evaluate and make a referral to see psychiatry as needed. Baby Gume is 10 weeks old, has developed allergy to formula.  She changed to amino acid formula and he is adjusting to it in terms of bowel habits.  She is overwhelmed. She had mild diarrhea with Herceptin Perjeta, recommend to use Imodium. She started postmastectomy radiation 3/6/23 tentative completion date 3/31/23. Endocrine therapy has been discussed with her.  She is a candidate for ovarian suppression plus AI.  She started Lupron 2/3/2023.  She will be monitored for toxicity.  We will start AI after radiation. RTO 4 weeks to monitor Lupron toxicity and mental health/postpartum issues.  Cardio referral to monitor for cardiotoxicity from katerin, HP and early menopause at a later time  6/9/2023 She has completed Taxol.  Herceptin Perjeta No 9  today.  Started Lupron 1/2023 tolerating well.  She reports moderate vaginal bleeding x 4 days 6/2-6/5/23 had to wear a pad.  Ovarian suppression not effective, Dr. Jason discussed scheduling BSO  ASAP  2/2 high risk disease. She has 2 young children girl and boy  Also discussed stopping AI at this time until BSO complete. Discussed  effects of early menopause Will refer to Dr. Minda Machado. Started Exemestane 4/28/2023. She developed painful rash/papules to face 2 weeks after starting AI. denies itching or sob.  6/23/23 She is having off/on diarrhea x 1 week taking imodium with some relief no abd pain, fever or vomiting No sick contact  Likely HP related She has very high risk disease with multiple positive nodes, therefore will not drop Perjeta aggressively manage diarrhea with IV hydration, electrolyte supplementation, imodium and lomotil d/w her Pt and spouse in agreement  9/2023 She had BSO 6/2023, experiencing extreme hot flashes, started effexor 37.5 mg 7/2023, helping but still have sx, rec to increase dose to 75 Tolerating HP, mild Gi sx with HP, no CP sx. Echo 8/2023 She is on exemestane, no arthralgias She has sternal/right chest pain. She has high risk disease. Will do CT scans to evaluate Paternal GF had colon cancer patient would like to have colonoscopy will revisit scheduling colonoscopy next RPA.    12/2023  s/p left mastectomy and revision of right reconstructed breast with removal of right tissue expander and bilateral NICOLLE flap reconstruction DOS: 11/21/23. She had BSO 6/2023, experiencing extreme hot flashes, started effexor 37.5 mg 7/2023, increased to 75mg 9/2023, helping but still have sx, rec to increase dose to 150  Tolerating HP, mild Gi sx with HP, no CP sx. Echo 8/2023, 11/2023. Last herceptin today, discussed nerlynx She is on exemestane, no arthralgias CT 9/2023 MANI Paternal GF had colon cancer patient would like to have colonoscopy will revisit scheduling colonoscopy next RPA. s/e of AI and premature menopause d/w her, referred to see cardio onc  S/E of nerlynx, dose escalation and anti diarrheal use d/w her She will start in 4 weeks.   1/24/2024 She is here with parents and spouse to discuss prognosis and risk of recurrence She started nerlynx 3 weeks ago, did well with 3 pills x 1 week, developed sevre diarrhea with cramping with 4 pills. Stopped last week and sx resolved.  She has questions about risk of recurrence and benefit from nerlynx.  We reviewed path - T3N3 ER/PA/HER+ High risk of recurrence due to large tumor size andd multiple nodes d/w them Better prognosis related to HER2 positivity reviewed EXTENET trial and benefit from neratinib for node + disease reviewed Given toxicity, rec to restart 3 pills a day. Use imodium, add lomotil Continue same dose for 3 weeks. If tolerated, would consider to go up to 4 pills a day  Mother was emotionally upset, she was reassured  2/2024 SHe is on nerlynx 3 pills a day  Still getting loose BM 8-10 days, despite taking 6-8 imodium a day  Wasn't able to fill lomotil , resent lomotil to vivo cut down neralynx to 2 pills a day given significant diarrhea.  If she continues to have significant GI toxicity with 2 pills, would probably stop  If diarrhea improves with addition of lomotil, consider increasing to 3 pills again   [de-identified] : Ms. TAMERA RUTLEDGE is a  31 year old female, diagnosed with clinically stage II (ER positive) patient ER/TX/HER2 POSITIVE right breast cancer in FIRST TRIMESTER of pregnancy.  Initial consult with me at 10 weeks.  Patient underwent right mastectomy with Right axillary lymph node dissection on 6/21/2022. pT3 pN3a. ER/TX/HER2/sasha POSITIVE. Adj ACTHP started 8/2/22 at 19 weeks gestation. Genetics : BRCA NEGATIVE . Healthy baby boy delivered at 37 week 12/2022. THP started 12/23/22. RT completed 3/2023. AI started 4/2023. BSO 6/2023. HP completed 12/2023. Nerlynx started 1/2024 6/2023 She is on 5 pills of neralynx. She is on lomotil bid. imodium PRN and diarrhea is much better.  Reports burning in the right reconstructed breast x 2 weeks. She had similar symptoms at presentation and is worried about recurrence. US 6/2024 MANI Given her concerns, will do breast MRI Sx are likely post surgical. Rec gabapentin 300 hs for symptom control  BL MRM: no M/S needed She had BSO 6/2023, experiencing extreme hot flashes, started effexor 37.5 mg 7/2023, increased to 75mg 9/2023, helping but still have sx, now on 150. hot flashes are managable  She is on exemestane, no arthralgias CT 9/2023 MANI Paternal GF had colon cancer patient would like to have colonoscopy will revisit scheduling colonoscopy next RPA. s/e of AI and premature menopause d/w her, referred to see cardio onc DEXA Bone Health: 7/5/23; WNL.

## 2024-06-20 NOTE — ASSESSMENT
[Curative] : Goals of care discussed with patient: Curative [FreeTextEntry1] : Ms. TAMERA RUTLEDGE is a 31 year old female, diagnosed with clinically stage II (ER positive) patient ER/KS/HER2 POSITIVE right breast cancer in FIRST TRIMESTER of pregnancy. Initial consult with me at 10 weeks. Patient underwent right mastectomy with Right axillary lymph node dissection on 6/21/2022. pT3 pN3a. ER/KS/HER2/sasha POSITIVE. Adj ACTHP started 8/2/22 at 19 weeks gestation. RBCA neg. Healthy baby boy delivered at 37 week 12/9/2022. THP started 12/23/22. Started Exemestane 4/28/2023  1. Breast ca- Ms. TAMERA RUTLEDGE completed Taxol. here for Herceptin Perjeta. Started Lupron 1/2023. Started Exemestane 4/28/2023. RT completed 3/2023. BSO 6/2023. HP completed 12/2023. Nerlynx started 1/2024 6/2023 She is on 5 pills of neralynx. She is on lomotil bid. imodium PRN and diarrhea is much better.  Reports burning in the right reconstructed breast x 2 weeks. She had similar symptoms at presentation and is worried about recurrence. US 6/2024 MANI Given her concerns, will do breast MRI Sx are likely post surgical. Rec gabapentin 300 hs for symptom control  BL MRM: no M/S needed She had BSO 6/2023, experiencing extreme hot flashes, started effexor 37.5 mg 7/2023, increased to 75mg 9/2023, helping but still have sx, now on 150. hot flashes are managable  She is on exemestane, no arthralgias CT 9/2023 MANI Paternal GF had colon cancer patient would like to have colonoscopy will revisit scheduling colonoscopy next RPA. s/e of AI and premature menopause d/w her, referred to see cardio onc DEXA Bone Health: 7/5/23; WNL.    - She is at risk for lymphedema due to lymph node dissection and radiation. She is referred to lymphedema therapy. - Chemo induced diarrhea- Imodium PRN. Maintain PO hydration. BRAT diet - Chemo cardio toxicity: Cardio referral to monitor for cardiotoxicity from katerin, HP and early menopause. Echo q3m - Instructed to call office and go directly to the emergency room with fever more than 100.4, shaking chills, productive cough, sore throat, shortness of breath or urinary symptoms. Patient verbalized understanding and agreement. - Emotional support provided, all questions answered.  RTO  2 m

## 2024-06-20 NOTE — PHYSICAL EXAM
[Fully active, able to carry on all pre-disease performance without restriction] : Status 0 - Fully active, able to carry on all pre-disease performance without restriction [Normal] : affect appropriate [de-identified] : port removed [de-identified] : BL NICOLLE flaps healied well

## 2024-06-20 NOTE — REASON FOR VISIT
[Follow-Up Visit] : a follow-up [Other: _____] : [unfilled] [FreeTextEntry2] :  ER+ >90%, IL+ >90%, HER 2+  Invasive poorly differentiated ductal carcinoma of the right breast

## 2024-06-24 ENCOUNTER — RESULT REVIEW (OUTPATIENT)
Age: 34
End: 2024-06-24

## 2024-06-24 ENCOUNTER — APPOINTMENT (OUTPATIENT)
Dept: MRI IMAGING | Facility: CLINIC | Age: 34
End: 2024-06-24
Payer: COMMERCIAL

## 2024-06-24 PROCEDURE — A9585: CPT | Mod: JW

## 2024-06-24 PROCEDURE — 77049 MRI BREAST C-+ W/CAD BI: CPT

## 2024-06-24 RX ORDER — DIPHENOXYLATE HYDROCHLORIDE AND ATROPINE SULFATE 2.5; .025 MG/1; MG/1
2.5-0.025 TABLET ORAL
Qty: 120 | Refills: 0 | Status: ACTIVE | COMMUNITY
Start: 2024-01-24 | End: 1900-01-01

## 2024-06-25 ENCOUNTER — NON-APPOINTMENT (OUTPATIENT)
Age: 34
End: 2024-06-25

## 2024-07-11 ENCOUNTER — OUTPATIENT (OUTPATIENT)
Dept: OUTPATIENT SERVICES | Facility: HOSPITAL | Age: 34
LOS: 1 days | Discharge: HOME | End: 2024-07-11

## 2024-07-11 DIAGNOSIS — Z90.722 ACQUIRED ABSENCE OF OVARIES, BILATERAL: Chronic | ICD-10-CM

## 2024-07-11 DIAGNOSIS — Z95.828 PRESENCE OF OTHER VASCULAR IMPLANTS AND GRAFTS: Chronic | ICD-10-CM

## 2024-07-11 DIAGNOSIS — C50.919 MALIGNANT NEOPLASM OF UNSPECIFIED SITE OF UNSPECIFIED FEMALE BREAST: ICD-10-CM

## 2024-07-11 DIAGNOSIS — Z90.89 ACQUIRED ABSENCE OF OTHER ORGANS: Chronic | ICD-10-CM

## 2024-07-11 DIAGNOSIS — Z90.11 ACQUIRED ABSENCE OF RIGHT BREAST AND NIPPLE: Chronic | ICD-10-CM

## 2024-07-11 DIAGNOSIS — Z98.890 OTHER SPECIFIED POSTPROCEDURAL STATES: Chronic | ICD-10-CM

## 2024-07-15 ENCOUNTER — APPOINTMENT (OUTPATIENT)
Dept: HEMATOLOGY ONCOLOGY | Facility: CLINIC | Age: 34
End: 2024-07-15

## 2024-07-19 ENCOUNTER — RESULT REVIEW (OUTPATIENT)
Age: 34
End: 2024-07-19

## 2024-07-19 ENCOUNTER — OUTPATIENT (OUTPATIENT)
Dept: OUTPATIENT SERVICES | Facility: HOSPITAL | Age: 34
LOS: 1 days | End: 2024-07-19
Payer: COMMERCIAL

## 2024-07-19 ENCOUNTER — APPOINTMENT (OUTPATIENT)
Dept: GASTROENTEROLOGY | Facility: HOSPITAL | Age: 34
End: 2024-07-19

## 2024-07-19 DIAGNOSIS — Z90.89 ACQUIRED ABSENCE OF OTHER ORGANS: Chronic | ICD-10-CM

## 2024-07-19 DIAGNOSIS — Z98.890 OTHER SPECIFIED POSTPROCEDURAL STATES: Chronic | ICD-10-CM

## 2024-07-19 DIAGNOSIS — Z90.722 ACQUIRED ABSENCE OF OVARIES, BILATERAL: Chronic | ICD-10-CM

## 2024-07-19 DIAGNOSIS — Z95.828 PRESENCE OF OTHER VASCULAR IMPLANTS AND GRAFTS: Chronic | ICD-10-CM

## 2024-07-19 DIAGNOSIS — Z90.11 ACQUIRED ABSENCE OF RIGHT BREAST AND NIPPLE: Chronic | ICD-10-CM

## 2024-07-19 DIAGNOSIS — Z12.11 ENCOUNTER FOR SCREENING FOR MALIGNANT NEOPLASM OF COLON: ICD-10-CM

## 2024-07-19 PROCEDURE — 45380 COLONOSCOPY AND BIOPSY: CPT

## 2024-07-19 PROCEDURE — 88305 TISSUE EXAM BY PATHOLOGIST: CPT | Mod: 26

## 2024-07-19 PROCEDURE — 88305 TISSUE EXAM BY PATHOLOGIST: CPT

## 2024-07-19 PROCEDURE — 45380 COLONOSCOPY AND BIOPSY: CPT | Mod: PT

## 2024-07-19 DEVICE — CLIP RESOLUTION 360 235CM: Type: IMPLANTABLE DEVICE | Status: FUNCTIONAL

## 2024-07-19 DEVICE — HEMOSPRAY HEMOSTAT ENDO 7F: Type: IMPLANTABLE DEVICE | Status: FUNCTIONAL

## 2024-07-22 LAB — SURGICAL PATHOLOGY STUDY: SIGNIFICANT CHANGE UP

## 2024-07-25 DIAGNOSIS — K62.89 OTHER SPECIFIED DISEASES OF ANUS AND RECTUM: ICD-10-CM

## 2024-07-25 RX ORDER — MESALAMINE 1000 MG/1
1000 SUPPOSITORY RECTAL
Qty: 30 | Refills: 5 | Status: ACTIVE | COMMUNITY
Start: 2024-07-25 | End: 1900-01-01

## 2024-08-12 ENCOUNTER — APPOINTMENT (OUTPATIENT)
Dept: GASTROENTEROLOGY | Facility: CLINIC | Age: 34
End: 2024-08-12

## 2024-08-21 ENCOUNTER — APPOINTMENT (OUTPATIENT)
Dept: GASTROENTEROLOGY | Facility: CLINIC | Age: 34
End: 2024-08-21
Payer: COMMERCIAL

## 2024-08-21 VITALS
WEIGHT: 200 LBS | DIASTOLIC BLOOD PRESSURE: 87 MMHG | SYSTOLIC BLOOD PRESSURE: 124 MMHG | HEIGHT: 66 IN | BODY MASS INDEX: 32.14 KG/M2

## 2024-08-21 DIAGNOSIS — T45.1X5A NAUSEA WITH VOMITING, UNSPECIFIED: ICD-10-CM

## 2024-08-21 DIAGNOSIS — K63.5 POLYP OF COLON: ICD-10-CM

## 2024-08-21 DIAGNOSIS — B00.1 HERPESVIRAL VESICULAR DERMATITIS: ICD-10-CM

## 2024-08-21 DIAGNOSIS — Z80.0 FAMILY HISTORY OF MALIGNANT NEOPLASM OF DIGESTIVE ORGANS: ICD-10-CM

## 2024-08-21 DIAGNOSIS — Z09 ENCOUNTER FOR FOLLOW-UP EXAMINATION AFTER COMPLETED TREATMENT FOR CONDITIONS OTHER THAN MALIGNANT NEOPLASM: ICD-10-CM

## 2024-08-21 DIAGNOSIS — K52.1 TOXIC GASTROENTERITIS AND COLITIS: ICD-10-CM

## 2024-08-21 DIAGNOSIS — R19.7 DIARRHEA, UNSPECIFIED: ICD-10-CM

## 2024-08-21 DIAGNOSIS — K62.89 OTHER SPECIFIED DISEASES OF ANUS AND RECTUM: ICD-10-CM

## 2024-08-21 DIAGNOSIS — T45.1X5A TOXIC GASTROENTERITIS AND COLITIS: ICD-10-CM

## 2024-08-21 DIAGNOSIS — D64.9 ANEMIA, UNSPECIFIED: ICD-10-CM

## 2024-08-21 DIAGNOSIS — R11.2 NAUSEA WITH VOMITING, UNSPECIFIED: ICD-10-CM

## 2024-08-21 DIAGNOSIS — K62.5 HEMORRHAGE OF ANUS AND RECTUM: ICD-10-CM

## 2024-08-21 PROCEDURE — 99214 OFFICE O/P EST MOD 30 MIN: CPT | Mod: 25

## 2024-08-21 PROCEDURE — G2211 COMPLEX E/M VISIT ADD ON: CPT | Mod: NC

## 2024-08-21 NOTE — PHYSICAL EXAM
[Normal] : the sclera and conjunctiva were normal [Hearing Threshold Finger Rub Not [place]] : hearing was normal [Normal Appearance] : the appearance of the neck was normal [No Respiratory Distress] : no respiratory distress [Abdomen Tenderness] : non-tender [Abdomen Soft] : soft [Oriented To Time, Place, And Person] : oriented to person, place, and time

## 2024-08-21 NOTE — PHYSICAL EXAM
[Normal] : the sclera and conjunctiva were normal [Hearing Threshold Finger Rub Not [place]] : hearing was normal [Normal Appearance] : the appearance of the neck was normal [No Respiratory Distress] : no respiratory distress [Abdomen Tenderness] : non-tender [Abdomen Soft] : soft [Oriented To Time, Place, And Person] : oriented to person, place, and time

## 2024-08-22 LAB
CRP SERPL-MCNC: 14 MG/L
ERYTHROCYTE [SEDIMENTATION RATE] IN BLOOD BY WESTERGREN METHOD: 34 MM/HR
FERRITIN SERPL-MCNC: 77 NG/ML
FOLATE SERPL-MCNC: 14.6 NG/ML
IRON SATN MFR SERPL: 23 %
IRON SERPL-MCNC: 90 UG/DL
TIBC SERPL-MCNC: 386 UG/DL
UIBC SERPL-MCNC: 296 UG/DL
VIT B12 SERPL-MCNC: 619 PG/ML

## 2024-08-22 NOTE — HISTORY OF PRESENT ILLNESS
[FreeTextEntry1] : 7/19/24, Dr. Maldonado: hyperplastic colon polyp x4 (sigmoid) and focal acute chronic inflammation in the rectum. Repeat colonoscopy in 3 years.

## 2024-08-22 NOTE — ASSESSMENT
[FreeTextEntry1] : Pleasant 33 year old woman with a FH of colon cancer (paternal grandfather), a FH of colon polyps (father and brother), a Hx of breast cancer (~ 2 years ago; s/p R mastectomy and NICOLLE flap reconstruction; on Nerlynx; on Lomotil for diarrhea secondary to Nerlynx use), elevated ALT, and anemia presents for follow-up on diarrhea and after recent colonoscopy that showed benign colon polyps and  focal acute chronic inflammation in the rectum. Recommend that the pt complete blood work (e.g. CRP, Sed rate), complete a stool test to look for inflammation (e.g. Fecal Calprotectin; 1st BM of the day), start Canasa suppositories for proctitis (previously ordered), and follow-up with Oncologist to discuss screening colonoscopy schedule (e.g. possible serum genetic testing).   All questions answered Call with any questions or concerns Time spent before and after visit reviewing patient's chart

## 2024-08-31 NOTE — DISCHARGE NOTE NURSING/CASE MANAGEMENT/SOCIAL WORK - NSCORESITESY/N_GEN_A_CORE_RD
Try delsum cough syrup per package instructions. We will notify you of the swab results via [employer].  If flu is positive could try tamiflu otherwise symptomatic care.     
No

## 2024-09-13 NOTE — DISCHARGE NOTE OB - AVOID DOUCHING OR TAMPONS UNTIL YOUR POSTPARTUM VISIT
NICHOLAS Alba    Nerve Cath Post Op Call    Patient Name: Kristina Mckeon  :  1960  MRN:  [id number]  Date of Discharge: 9/10/2024    Nerve Cath Post Op Call:    Analgesia:Good  Pain Score:3/10  Side Effects:None  Catheter Site:clean  Patient Controlled ON Q pump infusion rate: 8ml/hr  Catheter Plan: Patient/Family member was instructed to remove the catheter during telephone contact The patient was instructed to call ON CALL Anesthesia provider for any questions or problems                                              
  NICHOLAS Alba    Nerve Cath Post Op Call    Patient Name: Kristina Mckeon  :  1960  MRN:  [id number]  Date of Discharge: 9/10/2024    Nerve Cath Post Op Call:    Analgesia:Good  Pain Score:5/10  Side Effects:None  Catheter Site:clean  Patient Controlled ON Q pump infusion rate: 6ml/hr  Catheter Plan: Modify plan as follows (see note)      Increase rate to 8mL/hr                                            
Statement Selected

## 2024-09-26 ENCOUNTER — APPOINTMENT (OUTPATIENT)
Dept: PLASTIC SURGERY | Facility: CLINIC | Age: 34
End: 2024-09-26

## 2024-09-26 ENCOUNTER — APPOINTMENT (OUTPATIENT)
Dept: CARDIOLOGY | Facility: CLINIC | Age: 34
End: 2024-09-26

## 2024-09-26 VITALS
SYSTOLIC BLOOD PRESSURE: 129 MMHG | OXYGEN SATURATION: 98 % | HEIGHT: 66 IN | HEART RATE: 87 BPM | DIASTOLIC BLOOD PRESSURE: 89 MMHG | BODY MASS INDEX: 32.62 KG/M2 | WEIGHT: 203 LBS

## 2024-09-26 VITALS
DIASTOLIC BLOOD PRESSURE: 94 MMHG | OXYGEN SATURATION: 99 % | TEMPERATURE: 98.3 F | WEIGHT: 200 LBS | BODY MASS INDEX: 32.14 KG/M2 | HEART RATE: 99 BPM | HEIGHT: 66 IN | SYSTOLIC BLOOD PRESSURE: 132 MMHG

## 2024-09-26 DIAGNOSIS — Z90.11 ACQUIRED ABSENCE OF RIGHT BREAST AND NIPPLE: ICD-10-CM

## 2024-09-26 DIAGNOSIS — Z91.89 OTHER SPECIFIED PERSONAL RISK FACTORS, NOT ELSEWHERE CLASSIFIED: ICD-10-CM

## 2024-09-26 DIAGNOSIS — E78.00 PURE HYPERCHOLESTEROLEMIA, UNSPECIFIED: ICD-10-CM

## 2024-09-26 DIAGNOSIS — Z92.3 PERSONAL HISTORY OF IRRADIATION: ICD-10-CM

## 2024-09-26 DIAGNOSIS — N65.0 DEFORMITY OF RECONSTRUCTED BREAST: ICD-10-CM

## 2024-09-26 PROCEDURE — G2211 COMPLEX E/M VISIT ADD ON: CPT | Mod: NC

## 2024-09-26 PROCEDURE — 93000 ELECTROCARDIOGRAM COMPLETE: CPT

## 2024-09-26 PROCEDURE — 99213 OFFICE O/P EST LOW 20 MIN: CPT

## 2024-09-26 PROCEDURE — 99215 OFFICE O/P EST HI 40 MIN: CPT

## 2024-09-26 NOTE — HISTORY OF PRESENT ILLNESS
[FreeTextEntry1] : 34 year old woman with a history of ER+/AZ+/HER2+ right breast cancer diagnosed in the first trimester of pregnancy s/p mastectomy, s/p Adriamycin, cyclophosphamide and Paclitaxel s/p herceptin and projeta, now neurinx now here to establish care.      She is on N

## 2024-09-26 NOTE — END OF VISIT
[FreeTextEntry3] :  All medical record entries made by the Scribe were at my, Dr. Hugh Siddiqui MD, direction and personally dictated by me on  09/26/2024. I have reviewed the chart and agree that the record accurately reflects my personal performance of the history, physical exam, assessment and plan. I have also personally directed, reviewed, and agreed with the chart.

## 2024-09-26 NOTE — HISTORY OF PRESENT ILLNESS
[FreeTextEntry1] : TAMERA RUTLEDGE is a 34 year old F who is s/p revision of bilateral reconstruction breasts possible liposuction. DOS: 3/11/24.  Patient was last seen in March of this year, reports her left breast appears bigger compared to the right, otherwise denies cp, sob, subjective fever, chills, headache, cough, myalgia, malaise, abd pain, n/v/d, decreased appetite, and generalized weakness.

## 2024-09-26 NOTE — PHYSICAL EXAM
[TextEntry] : Breasts: soft, NICOLLE flaps are well incorporated, incisions well healed,  Right breast: s/p xrt skin changes left slightly bigger than right and more ptotic, breast size L>R, more volume to the lower lateral aspect of left breast Abdomen: incision well healed, no bulges or hernias, soft, ND, +BS  Constitutional: NAD, well-nourished HENT: Normocephalic, atraumatic, PERRL, non-icteric sclerae, neck supple, trachea midline PULM: LSCTAB, no wheezing or rhonchi CV: RRR, no murmur, rubs, or gallops MSK: BOYER, 5/5 strength to all extremities Skin: No rash noted Neuro: A&O x 3, no FND Psych: Mood is appropriate

## 2024-09-26 NOTE — ADDENDUM
[FreeTextEntry1] :  I, Steffanie Coleman, documented this note as a scribe on behalf of Dr. Hugh Siddiqui MD on  09/26/2024.

## 2024-09-26 NOTE — DISCUSSION/SUMMARY
[TextEntry] : TAMERA RUTLEDGE is a 34 year old F who is s/p revision of bilateral reconstruction breasts possible liposuction. DOS: 3/11/24.  Exam findings discussed with patient, she may benefit from revision surgery, symmetrizing left breast reduction, liposuction with fat grafting. Photos taken. Risks, benefits, and alternatives to surgery were reviewed and routine crista-operative course described, which include but are not limited to infection, bleeding, recurrence, seroma, asymmetry, deformity, scarring, paresthesia, wound dehiscence, etc. Patient expressed understanding and wishes to proceed. Will proceed with surgical planning.  - Plan for revision of bilateral reconstructed breasts, left breast reduction for symmetry, liposuction with fat grafting - Our office will coordinate with patient - Surgical paperwork submitted

## 2024-10-04 ENCOUNTER — APPOINTMENT (OUTPATIENT)
Dept: CARDIOLOGY | Facility: CLINIC | Age: 34
End: 2024-10-04
Payer: COMMERCIAL

## 2024-10-04 PROCEDURE — 93356 MYOCRD STRAIN IMG SPCKL TRCK: CPT

## 2024-10-04 PROCEDURE — 93306 TTE W/DOPPLER COMPLETE: CPT

## 2024-10-18 ENCOUNTER — APPOINTMENT (OUTPATIENT)
Dept: GASTROENTEROLOGY | Facility: CLINIC | Age: 34
End: 2024-10-18
Payer: COMMERCIAL

## 2024-10-18 VITALS
HEIGHT: 65 IN | OXYGEN SATURATION: 97 % | WEIGHT: 200 LBS | DIASTOLIC BLOOD PRESSURE: 89 MMHG | HEART RATE: 103 BPM | SYSTOLIC BLOOD PRESSURE: 135 MMHG | BODY MASS INDEX: 33.32 KG/M2

## 2024-10-18 DIAGNOSIS — R19.7 DIARRHEA, UNSPECIFIED: ICD-10-CM

## 2024-10-18 DIAGNOSIS — R11.2 NAUSEA WITH VOMITING, UNSPECIFIED: ICD-10-CM

## 2024-10-18 DIAGNOSIS — K62.89 OTHER SPECIFIED DISEASES OF ANUS AND RECTUM: ICD-10-CM

## 2024-10-18 DIAGNOSIS — K63.5 POLYP OF COLON: ICD-10-CM

## 2024-10-18 DIAGNOSIS — T45.1X5A NAUSEA WITH VOMITING, UNSPECIFIED: ICD-10-CM

## 2024-10-18 DIAGNOSIS — Z91.89 OTHER SPECIFIED PERSONAL RISK FACTORS, NOT ELSEWHERE CLASSIFIED: ICD-10-CM

## 2024-10-18 DIAGNOSIS — K62.5 HEMORRHAGE OF ANUS AND RECTUM: ICD-10-CM

## 2024-10-18 PROCEDURE — 99214 OFFICE O/P EST MOD 30 MIN: CPT

## 2024-10-22 ENCOUNTER — OUTPATIENT (OUTPATIENT)
Dept: OUTPATIENT SERVICES | Facility: HOSPITAL | Age: 34
LOS: 1 days | End: 2024-10-22
Payer: COMMERCIAL

## 2024-10-22 VITALS
RESPIRATION RATE: 18 BRPM | WEIGHT: 205.03 LBS | HEART RATE: 90 BPM | TEMPERATURE: 98 F | HEIGHT: 65 IN | OXYGEN SATURATION: 97 % | DIASTOLIC BLOOD PRESSURE: 84 MMHG | SYSTOLIC BLOOD PRESSURE: 130 MMHG

## 2024-10-22 DIAGNOSIS — Z95.828 PRESENCE OF OTHER VASCULAR IMPLANTS AND GRAFTS: Chronic | ICD-10-CM

## 2024-10-22 DIAGNOSIS — Z01.818 ENCOUNTER FOR OTHER PREPROCEDURAL EXAMINATION: ICD-10-CM

## 2024-10-22 DIAGNOSIS — N65.0 DEFORMITY OF RECONSTRUCTED BREAST: ICD-10-CM

## 2024-10-22 DIAGNOSIS — Z98.890 OTHER SPECIFIED POSTPROCEDURAL STATES: Chronic | ICD-10-CM

## 2024-10-22 DIAGNOSIS — Z90.11 ACQUIRED ABSENCE OF RIGHT BREAST AND NIPPLE: Chronic | ICD-10-CM

## 2024-10-22 DIAGNOSIS — C50.919 MALIGNANT NEOPLASM OF UNSPECIFIED SITE OF UNSPECIFIED FEMALE BREAST: ICD-10-CM

## 2024-10-22 DIAGNOSIS — Z90.722 ACQUIRED ABSENCE OF OVARIES, BILATERAL: Chronic | ICD-10-CM

## 2024-10-22 DIAGNOSIS — Z92.3 PERSONAL HISTORY OF IRRADIATION: ICD-10-CM

## 2024-10-22 DIAGNOSIS — C50.911 MALIGNANT NEOPLASM OF UNSPECIFIED SITE OF RIGHT FEMALE BREAST: ICD-10-CM

## 2024-10-22 DIAGNOSIS — Z90.11 ACQUIRED ABSENCE OF RIGHT BREAST AND NIPPLE: ICD-10-CM

## 2024-10-22 DIAGNOSIS — Z90.89 ACQUIRED ABSENCE OF OTHER ORGANS: Chronic | ICD-10-CM

## 2024-10-22 PROCEDURE — 36415 COLL VENOUS BLD VENIPUNCTURE: CPT

## 2024-10-22 PROCEDURE — G0463: CPT

## 2024-10-22 NOTE — H&P PST ADULT - HISTORY OF PRESENT ILLNESS
Patient is a 33 year old Fwith PMHx of right breast cancer, s/p mastectomy, completed chemo 1/2023, RT completed 3/2023, completed immuno y currently on PO hormonal therapy presents for perioperative testing for revision of bilateral reconstructed breasts, possible liposuction with fat grafting to bilateral revision of abdominal scar with local flaps with Dr. Siddiqui scheduled for 03/11/2024. Patient reports this is her revision portion of her reconstruction surgery. Denies any acute symptoms at this time. Otherwise patient reports feeling well overall today at PST.          Patient is a 34 year old Female with PMHx of right breast cancer, s/p mastectomy, completed chemo 1/2023, RT completed 3/2023, completed [id number]  currently on PO hormonal therapy presents for PST.  Patient for follow up breast surgery and is feeling well at [facility] today with no recent cough, fever or acute illness.  Scheduled for revision of bilateral reconstructed breast, left breast reduction for symmetry, liposuction with fat grafting with Dr Siddiqui on 10/25/2024.

## 2024-10-22 NOTE — H&P PST ADULT - NSICDXPASTSURGICALHX_GEN_ALL_CORE_FT
Pf[employer] dose 1 PAST SURGICAL HISTORY:  H/O bilateral salpingo-oophorectomy     H/O breast surgery     History of breast reconstruction     History of right total mastectomy     History of tonsillectomy     Port-A-Cath in place

## 2024-10-22 NOTE — H&P PST ADULT - PROBLEM SELECTOR PLAN 1
Scheduled for revision of bilateral reconstructed breast, left breast reduction for symmetry, liposuction with fat grafting with Dr Siddiqui on 10/25/2024.  Pre op instructions given and patient verbalized understanding.  CBC CMP, EKG and last cardio visit on chart.  No labs drawn at [facility] Attempted lab draw for cbc patient reported dizziness.  Recovered with rest.  If repeat labs requested to be don AM of procedure.   - Acceptable labs placed on chart within acceptable 6 months time frame per policy, -   NPO after midnight night before procedure.  TO stop all ASA, NSAIDs, vitamins and supplements today.  Chlorhexidene wash given with instructions

## 2024-10-24 NOTE — ASU PATIENT PROFILE, ADULT - NSICDXPASTSURGICALHX_GEN_ALL_CORE_FT
PAST SURGICAL HISTORY:  H/O bilateral salpingo-oophorectomy     H/O breast surgery     History of breast reconstruction NICOLLE flap    History of right total mastectomy     History of tonsillectomy     Port-A-Cath in place removed

## 2024-10-25 ENCOUNTER — APPOINTMENT (OUTPATIENT)
Dept: PLASTIC SURGERY | Facility: HOSPITAL | Age: 34
End: 2024-10-25

## 2024-10-25 ENCOUNTER — APPOINTMENT (OUTPATIENT)
Dept: INTERNAL MEDICINE | Facility: CLINIC | Age: 34
End: 2024-10-25

## 2024-10-25 ENCOUNTER — OUTPATIENT (OUTPATIENT)
Dept: OUTPATIENT SERVICES | Facility: HOSPITAL | Age: 34
LOS: 1 days | End: 2024-10-25
Payer: COMMERCIAL

## 2024-10-25 VITALS
RESPIRATION RATE: 15 BRPM | SYSTOLIC BLOOD PRESSURE: 119 MMHG | TEMPERATURE: 97 F | DIASTOLIC BLOOD PRESSURE: 82 MMHG | WEIGHT: 205.03 LBS | HEIGHT: 65 IN | OXYGEN SATURATION: 97 % | HEART RATE: 69 BPM

## 2024-10-25 VITALS
TEMPERATURE: 97 F | RESPIRATION RATE: 16 BRPM | OXYGEN SATURATION: 95 % | HEART RATE: 93 BPM | SYSTOLIC BLOOD PRESSURE: 128 MMHG | DIASTOLIC BLOOD PRESSURE: 84 MMHG

## 2024-10-25 DIAGNOSIS — N65.0 DEFORMITY OF RECONSTRUCTED BREAST: ICD-10-CM

## 2024-10-25 DIAGNOSIS — C50.919 MALIGNANT NEOPLASM OF UNSPECIFIED SITE OF UNSPECIFIED FEMALE BREAST: ICD-10-CM

## 2024-10-25 DIAGNOSIS — Z98.890 OTHER SPECIFIED POSTPROCEDURAL STATES: Chronic | ICD-10-CM

## 2024-10-25 DIAGNOSIS — Z90.11 ACQUIRED ABSENCE OF RIGHT BREAST AND NIPPLE: Chronic | ICD-10-CM

## 2024-10-25 DIAGNOSIS — Z90.722 ACQUIRED ABSENCE OF OVARIES, BILATERAL: Chronic | ICD-10-CM

## 2024-10-25 DIAGNOSIS — Z90.89 ACQUIRED ABSENCE OF OTHER ORGANS: Chronic | ICD-10-CM

## 2024-10-25 DIAGNOSIS — Z92.3 PERSONAL HISTORY OF IRRADIATION: ICD-10-CM

## 2024-10-25 DIAGNOSIS — Z95.828 PRESENCE OF OTHER VASCULAR IMPLANTS AND GRAFTS: Chronic | ICD-10-CM

## 2024-10-25 DIAGNOSIS — Z90.11 ACQUIRED ABSENCE OF RIGHT BREAST AND NIPPLE: ICD-10-CM

## 2024-10-25 PROCEDURE — 15772 GRFG AUTOL FAT LIPO EA ADDL: CPT

## 2024-10-25 PROCEDURE — 15771 GRFG AUTOL FAT LIPO 50 CC/<: CPT

## 2024-10-25 PROCEDURE — 19380 REVJ RECONSTRUCTED BREAST: CPT | Mod: 50

## 2024-10-25 RX ORDER — ONDANSETRON HYDROCHLORIDE 2 MG/ML
4 INJECTION, SOLUTION INTRAMUSCULAR; INTRAVENOUS ONCE
Refills: 0 | Status: DISCONTINUED | OUTPATIENT
Start: 2024-10-25 | End: 2024-10-25

## 2024-10-25 RX ORDER — OMEGA-3/EPA/FISH OIL 910-1300MG
1 CAPSULE,DELAYED RELEASE (ENTERIC COATED) ORAL
Refills: 0 | DISCHARGE

## 2024-10-25 RX ORDER — HYDROMORPHONE HCL/0.9% NACL/PF 6 MG/30 ML
1 PATIENT CONTROLLED ANALGESIA SYRINGE INTRAVENOUS
Refills: 0 | Status: DISCONTINUED | OUTPATIENT
Start: 2024-10-25 | End: 2024-10-25

## 2024-10-25 RX ORDER — BACILLUS COAGULANS/INULIN 1B-250 MG
1 CAPSULE ORAL
Refills: 0 | DISCHARGE

## 2024-10-25 RX ORDER — HYDROMORPHONE HCL/0.9% NACL/PF 6 MG/30 ML
0.5 PATIENT CONTROLLED ANALGESIA SYRINGE INTRAVENOUS
Refills: 0 | Status: DISCONTINUED | OUTPATIENT
Start: 2024-10-25 | End: 2024-10-25

## 2024-10-25 RX ORDER — MELATONIN 5 MG
2 TABLET ORAL
Refills: 0 | DISCHARGE

## 2024-10-25 RX ORDER — DIAZEPAM 10 MG/1
1 FILM BUCCAL
Qty: 12 | Refills: 0
Start: 2024-10-25 | End: 2024-10-27

## 2024-10-25 NOTE — ASU PREOP CHECKLIST - VERIFY SURGICAL SITE/SIDE WITH PATIENT
Furosemide RX send 1/26/23 and confirmed by pharmacy:  furosemide (LASIX) 20 MG Tab 90 Tablet 2/2 1/26/2023     Sig - Route: Take 1 Tablet by mouth every day. - Oral    Sent to pharmacy as: Furosemide 20 MG Oral Tablet (LASIX)    E-Prescribing Status: Receipt confirmed by pharmacy (1/26/2023 10:34 AM PST)      MyChart to patient   bilateral breasts bilateral breasts/done

## 2024-10-25 NOTE — ASU DISCHARGE PLAN (ADULT/PEDIATRIC) - PROCEDURE
Revision of bilateral breasts; liposuction from abdomen Revision of bilateral reconstructed breasts, left breast mastopexy; liposuction from abdomen

## 2024-10-25 NOTE — ASU DISCHARGE PLAN (ADULT/PEDIATRIC) - FINANCIAL ASSISTANCE
[facility] provides services at a reduced cost to those who are determined to be eligible through [facility]’s financial assistance program. Information regarding [facility]’s financial assistance program can be found by going to [email or web address] or by calling [phone].

## 2024-10-25 NOTE — BRIEF OPERATIVE NOTE - NSICDXBRIEFPREOP_GEN_ALL_CORE_FT
PRE-OP DIAGNOSIS:  Acquired absence of right breast and nipple 25-Oct-2024 06:44:34  Micheal Martinez

## 2024-10-25 NOTE — ASU DISCHARGE PLAN (ADULT/PEDIATRIC) - CALL YOUR DOCTOR IF YOU HAVE ANY OF THE FOLLOWING:
Patient stating she caught pink eye from her grandson and she wants to know if Dr. Terri Cifuentes can call in something to [place]. Fever greater than (need to indicate Fahrenheit or Celsius) Bleeding that does not stop/Swelling that gets worse/Pain not relieved by Medications/Fever greater than (need to indicate Fahrenheit or Celsius)/Wound/Surgical Site with redness, or foul smelling discharge or pus/Nausea and vomiting that does not stop

## 2024-10-25 NOTE — ASU DISCHARGE PLAN (ADULT/PEDIATRIC) - ASU DC SPECIAL INSTRUCTIONSFT
Please follow up with Dr. Siddiqui in 1 week. You can call his office to set up an appointment.     You should keep your dressing on until follow up. You can shower 48 hours after your surgery. Please do not scrub your incision site/dressing, but you can let water run over the bandage. You should only shower. You should not take baths or submerge in any bodies of water. You can take tylenol/motrin over the counter for pain. You have also been prescribed ___ which you can take for breakthrough pain. You should avoid heavy lifting, pushing, pulling, or direct pressure to the incision site. You can otherwise return to your normal daily activities. Please follow up with Dr. Siddiqui in 1 week. You can call his office to set up an appointment.     You should keep your dressing on until follow up, do not remove the clear prineo dressings. You can shower 48 hours after your surgery, starting Monday, please keep your top dry until then. Please do not scrub your incision site/dressing, but you can let water run over the bandage. You should only shower. You should not take baths or submerge in any bodies of water. You can take tylenol/motrin over the counter for pain. You have also been prescribed valium, which you can take for breakthrough pain/anxiety. You should avoid heavy lifting, pushing, pulling, or direct pressure to the incision site. You can otherwise return to your normal daily activities.

## 2024-10-25 NOTE — ASU DISCHARGE PLAN (ADULT/PEDIATRIC) - CARE PROVIDER_API CALL
Hugh Siddiqui)  Plastic Surgery  [address] [place] [address], [address]  [place] [address]  Phone: [phone]  Fax: [phone]  Follow Up Time: 1 week

## 2024-10-31 ENCOUNTER — APPOINTMENT (OUTPATIENT)
Dept: PLASTIC SURGERY | Facility: CLINIC | Age: 34
End: 2024-10-31
Payer: COMMERCIAL

## 2024-10-31 VITALS
RESPIRATION RATE: 16 BRPM | OXYGEN SATURATION: 100 % | BODY MASS INDEX: 33.32 KG/M2 | HEIGHT: 65 IN | SYSTOLIC BLOOD PRESSURE: 121 MMHG | WEIGHT: 200 LBS | HEART RATE: 99 BPM | TEMPERATURE: 97.9 F | DIASTOLIC BLOOD PRESSURE: 84 MMHG

## 2024-10-31 DIAGNOSIS — Z98.890 OTHER SPECIFIED POSTPROCEDURAL STATES: ICD-10-CM

## 2024-10-31 DIAGNOSIS — N65.0 DEFORMITY OF RECONSTRUCTED BREAST: ICD-10-CM

## 2024-10-31 DIAGNOSIS — Z90.11 ACQUIRED ABSENCE OF RIGHT BREAST AND NIPPLE: ICD-10-CM

## 2024-10-31 PROCEDURE — 99024 POSTOP FOLLOW-UP VISIT: CPT

## 2024-11-04 ENCOUNTER — OUTPATIENT (OUTPATIENT)
Dept: OUTPATIENT SERVICES | Facility: HOSPITAL | Age: 34
LOS: 1 days | Discharge: HOME | End: 2024-11-04

## 2024-11-04 DIAGNOSIS — Z90.11 ACQUIRED ABSENCE OF RIGHT BREAST AND NIPPLE: Chronic | ICD-10-CM

## 2024-11-04 DIAGNOSIS — C50.919 MALIGNANT NEOPLASM OF UNSPECIFIED SITE OF UNSPECIFIED FEMALE BREAST: ICD-10-CM

## 2024-11-04 DIAGNOSIS — Z98.890 OTHER SPECIFIED POSTPROCEDURAL STATES: Chronic | ICD-10-CM

## 2024-11-04 DIAGNOSIS — Z95.828 PRESENCE OF OTHER VASCULAR IMPLANTS AND GRAFTS: Chronic | ICD-10-CM

## 2024-11-04 DIAGNOSIS — Z90.722 ACQUIRED ABSENCE OF OVARIES, BILATERAL: Chronic | ICD-10-CM

## 2024-11-04 DIAGNOSIS — Z90.89 ACQUIRED ABSENCE OF OTHER ORGANS: Chronic | ICD-10-CM

## 2024-11-06 ENCOUNTER — APPOINTMENT (OUTPATIENT)
Dept: HEMATOLOGY ONCOLOGY | Facility: CLINIC | Age: 34
End: 2024-11-06
Payer: COMMERCIAL

## 2024-11-06 ENCOUNTER — NON-APPOINTMENT (OUTPATIENT)
Age: 34
End: 2024-11-06

## 2024-11-06 VITALS
OXYGEN SATURATION: 97 % | SYSTOLIC BLOOD PRESSURE: 108 MMHG | DIASTOLIC BLOOD PRESSURE: 77 MMHG | RESPIRATION RATE: 15 BRPM | BODY MASS INDEX: 33.92 KG/M2 | WEIGHT: 203.82 LBS | HEART RATE: 90 BPM | TEMPERATURE: 97.7 F

## 2024-11-06 DIAGNOSIS — C50.919 MALIGNANT NEOPLASM OF UNSPECIFIED SITE OF UNSPECIFIED FEMALE BREAST: ICD-10-CM

## 2024-11-06 PROCEDURE — G2211 COMPLEX E/M VISIT ADD ON: CPT | Mod: NC

## 2024-11-06 PROCEDURE — 99214 OFFICE O/P EST MOD 30 MIN: CPT

## 2024-11-14 ENCOUNTER — APPOINTMENT (OUTPATIENT)
Dept: PLASTIC SURGERY | Facility: CLINIC | Age: 34
End: 2024-11-14
Payer: COMMERCIAL

## 2024-11-14 VITALS
BODY MASS INDEX: 33.32 KG/M2 | OXYGEN SATURATION: 99 % | SYSTOLIC BLOOD PRESSURE: 126 MMHG | RESPIRATION RATE: 16 BRPM | TEMPERATURE: 97.6 F | DIASTOLIC BLOOD PRESSURE: 86 MMHG | WEIGHT: 200 LBS | HEART RATE: 96 BPM | HEIGHT: 65 IN

## 2024-11-14 DIAGNOSIS — N65.0 DEFORMITY OF RECONSTRUCTED BREAST: ICD-10-CM

## 2024-11-14 DIAGNOSIS — Z90.11 ACQUIRED ABSENCE OF RIGHT BREAST AND NIPPLE: ICD-10-CM

## 2024-11-14 DIAGNOSIS — Z98.890 OTHER SPECIFIED POSTPROCEDURAL STATES: ICD-10-CM

## 2024-11-14 PROCEDURE — 99024 POSTOP FOLLOW-UP VISIT: CPT

## 2024-12-02 NOTE — DISCHARGE NOTE OB - MEDICATION SUMMARY - MEDICATIONS TO CHANGE
"Subjective:       Patient ID: William Cavanaugh is a 14 y.o. male.    Vitals:  height is 5' 7" (1.702 m) and weight is 65.8 kg (145 lb). His oral temperature is 98.2 °F (36.8 °C). His blood pressure is 110/70 and his pulse is 95. His respiration is 16 and oxygen saturation is 98%.     Chief Complaint: Nail Problem    14 y.o. male who presents today with a chief complaint of ingrowing toenail of both feet.    Nail Problem  This is a new problem. The current episode started 1 to 4 weeks ago. The problem has been gradually worsening. Treatments tried: epsom salt. The treatment provided no relief.       Skin:  Positive for erythema.           Objective:      Physical Exam   Constitutional: He is oriented to person, place, and time.  Non-toxic appearance. He does not appear ill. No distress. normal  HENT:   Head: Normocephalic and atraumatic.   Mouth/Throat: Mucous membranes are moist. Oropharynx is clear.   Eyes: Conjunctivae are normal. Extraocular movement intact   Neck: Neck supple.   Cardiovascular: Normal rate, regular rhythm, normal heart sounds and normal pulses.   Pulmonary/Chest: Effort normal and breath sounds normal.   Abdominal: Normal appearance.   Musculoskeletal: Normal range of motion.         General: Tenderness present. No swelling. Normal range of motion.      Comments: Mild tenderness at medial/lateral nail borders of both great toes.   Neurological: He is alert and oriented to person, place, and time.   Skin: Skin is warm, dry and not diaphoretic. erythema         Comments: Mild erythema at medial/lateral nail borders of both great toes.   Psychiatric: His behavior is normal.   Vitals reviewed.        Past medical history and current medications reviewed.       Assessment:           1. Ingrown toenail of both feet              Plan:         Ingrown toenail of both feet  -     Ambulatory referral/consult to Podiatry  -     sulfamethoxazole-trimethoprim 800-160mg (BACTRIM DS) 800-160 mg Tab; Take 1 " tablet by mouth every 12 (twelve) hours. for 7 days  Dispense: 14 tablet; Refill: 0           INSTRUCTIONS  Meds as prescribed. Follow up with Podiatry as scheduled.         I will SWITCH the dose or number of times a day I take the medications listed below when I get home from the hospital:  None

## 2024-12-16 ENCOUNTER — APPOINTMENT (OUTPATIENT)
Dept: CARDIOLOGY | Facility: CLINIC | Age: 34
End: 2024-12-16

## 2024-12-30 NOTE — H&P PST ADULT - BIRTH SEX
"Caller: WolfeJohn \"BILL\"    Relationship: Self    Best call back number: [phone]     Requested Prescriptions:   Requested Prescriptions     Pending Prescriptions Disp Refills    Semaglutide,0.25 or 0.5MG/DOS, (Ozempic, 0.25 or 0.5 MG/DOSE,) 2 MG/3ML solution pen-injector 3 mL 0     Sig: Inject 0.25 mg under the skin into the appropriate area as directed 1 (One) Time Per Week. For 4 weeks, then increase to 0.5 mg weekly.        Pharmacy where request should be sent: [facility] [address] [place] [address] [phone] [facility] [phone] FX     Last office visit with prescribing clinician: 12/26/2024   Last telemedicine visit with prescribing clinician: Visit date not found   Next office visit with prescribing clinician: 1/31/2025     Additional details provided by patient: PHARMACY DID NOT RECEIVE THE NEW PRESCRIPTION WITH THE INSTRUCTIONS IN DETAIL. PLEASE RESEND.    Would you like a call back once the refill request has been completed: [] Yes [x] No    If the office needs to give you a call back, can they leave a voicemail: [x] Yes [] No    Andi Machado Rep   12/30/24 14:34 EST   "
Female

## 2024-12-31 ENCOUNTER — OFFICE (OUTPATIENT)
Dept: URBAN - METROPOLITAN AREA CLINIC 109 | Facility: CLINIC | Age: 34
Setting detail: OPHTHALMOLOGY
End: 2024-12-31
Payer: COMMERCIAL

## 2024-12-31 DIAGNOSIS — H00.011: ICD-10-CM

## 2024-12-31 PROCEDURE — 99213 OFFICE O/P EST LOW 20 MIN: CPT | Performed by: OPTOMETRIST

## 2024-12-31 ASSESSMENT — REFRACTION_CURRENTRX
OS_OVR_VA: 20/
OS_CYLINDER: -1.00
OS_SPHERE: -6.00
OS_AXIS: 173
OD_OVR_VA: 20/
OD_CYLINDER: -0.75
OD_AXIS: 017
OD_SPHERE: -5.00
OS_ADD: +2.25
OD_ADD: +2.25

## 2024-12-31 ASSESSMENT — TONOMETRY
OD_IOP_MMHG: 12
OD_IOP_MMHG: 13
OS_IOP_MMHG: 13

## 2024-12-31 ASSESSMENT — VISUAL ACUITY
OS_BCVA: 20/25-2
OD_BCVA: 20/2-1

## 2025-01-02 ENCOUNTER — APPOINTMENT (OUTPATIENT)
Dept: PLASTIC SURGERY | Facility: CLINIC | Age: 35
End: 2025-01-02
Payer: COMMERCIAL

## 2025-01-02 VITALS
OXYGEN SATURATION: 98 % | HEIGHT: 65 IN | HEART RATE: 87 BPM | BODY MASS INDEX: 33.32 KG/M2 | SYSTOLIC BLOOD PRESSURE: 112 MMHG | DIASTOLIC BLOOD PRESSURE: 83 MMHG | TEMPERATURE: 97.9 F | WEIGHT: 200 LBS | RESPIRATION RATE: 16 BRPM

## 2025-01-02 DIAGNOSIS — N65.0 DEFORMITY OF RECONSTRUCTED BREAST: ICD-10-CM

## 2025-01-02 DIAGNOSIS — Z90.13 ACQUIRED ABSENCE OF BILATERAL BREASTS AND NIPPLES: ICD-10-CM

## 2025-01-02 DIAGNOSIS — Z98.890 OTHER SPECIFIED POSTPROCEDURAL STATES: ICD-10-CM

## 2025-01-02 PROCEDURE — 99024 POSTOP FOLLOW-UP VISIT: CPT

## 2025-01-03 NOTE — H&P PST ADULT - PRO ARRIVE FROM
Medication: dexcom g7 sensor passed protocol.   Last office visit date: 5/21/24  Next appointment scheduled?: Yes   Number of refills given: 1 year supply     home

## 2025-01-29 ENCOUNTER — NON-APPOINTMENT (OUTPATIENT)
Age: 35
End: 2025-01-29

## 2025-01-29 ENCOUNTER — OUTPATIENT (OUTPATIENT)
Dept: OUTPATIENT SERVICES | Facility: HOSPITAL | Age: 35
LOS: 1 days | Discharge: HOME | End: 2025-01-29

## 2025-01-29 DIAGNOSIS — Z90.11 ACQUIRED ABSENCE OF RIGHT BREAST AND NIPPLE: Chronic | ICD-10-CM

## 2025-01-29 DIAGNOSIS — Z98.890 OTHER SPECIFIED POSTPROCEDURAL STATES: Chronic | ICD-10-CM

## 2025-01-29 DIAGNOSIS — Z90.722 ACQUIRED ABSENCE OF OVARIES, BILATERAL: Chronic | ICD-10-CM

## 2025-01-29 DIAGNOSIS — Z95.828 PRESENCE OF OTHER VASCULAR IMPLANTS AND GRAFTS: Chronic | ICD-10-CM

## 2025-01-29 DIAGNOSIS — C50.919 MALIGNANT NEOPLASM OF UNSPECIFIED SITE OF UNSPECIFIED FEMALE BREAST: ICD-10-CM

## 2025-01-29 DIAGNOSIS — Z90.89 ACQUIRED ABSENCE OF OTHER ORGANS: Chronic | ICD-10-CM

## 2025-01-31 ENCOUNTER — APPOINTMENT (OUTPATIENT)
Dept: CARDIOLOGY | Facility: CLINIC | Age: 35
End: 2025-01-31
Payer: COMMERCIAL

## 2025-01-31 PROCEDURE — 93306 TTE W/DOPPLER COMPLETE: CPT

## 2025-02-03 ENCOUNTER — RESULT REVIEW (OUTPATIENT)
Age: 35
End: 2025-02-03

## 2025-02-03 ENCOUNTER — APPOINTMENT (OUTPATIENT)
Dept: HEMATOLOGY ONCOLOGY | Facility: CLINIC | Age: 35
End: 2025-02-03
Payer: COMMERCIAL

## 2025-02-03 ENCOUNTER — NON-APPOINTMENT (OUTPATIENT)
Age: 35
End: 2025-02-03

## 2025-02-03 VITALS
TEMPERATURE: 97.4 F | SYSTOLIC BLOOD PRESSURE: 128 MMHG | OXYGEN SATURATION: 96 % | RESPIRATION RATE: 16 BRPM | WEIGHT: 214.05 LBS | BODY MASS INDEX: 35.66 KG/M2 | HEIGHT: 65 IN | HEART RATE: 88 BPM | DIASTOLIC BLOOD PRESSURE: 94 MMHG

## 2025-02-03 DIAGNOSIS — C50.919 MALIGNANT NEOPLASM OF UNSPECIFIED SITE OF UNSPECIFIED FEMALE BREAST: ICD-10-CM

## 2025-02-03 LAB
ALBUMIN SERPL ELPH-MCNC: 4.9 G/DL
ALP BLD-CCNC: 115 U/L
ALT SERPL-CCNC: 27 U/L
ANION GAP SERPL CALC-SCNC: 13 MMOL/L
AST SERPL-CCNC: 23 U/L
BASOPHILS # BLD AUTO: 0.02 K/UL — SIGNIFICANT CHANGE UP (ref 0–0.2)
BASOPHILS NFR BLD AUTO: 0.2 % — SIGNIFICANT CHANGE UP (ref 0–2)
BILIRUB SERPL-MCNC: 0.4 MG/DL
BUN SERPL-MCNC: 14 MG/DL
CALCIUM SERPL-MCNC: 10.3 MG/DL
CHLORIDE SERPL-SCNC: 101 MMOL/L
CO2 SERPL-SCNC: 27 MMOL/L
CREAT SERPL-MCNC: 0.7 MG/DL
EGFR: 115 ML/MIN/1.73M2
EOSINOPHIL # BLD AUTO: 0.01 K/UL — SIGNIFICANT CHANGE UP (ref 0–0.5)
EOSINOPHIL NFR BLD AUTO: 0.1 % — SIGNIFICANT CHANGE UP (ref 0–6)
GLUCOSE SERPL-MCNC: 86 MG/DL
HCT VFR BLD CALC: 41.2 % — SIGNIFICANT CHANGE UP (ref 34.5–45)
HGB BLD-MCNC: 13.9 G/DL — SIGNIFICANT CHANGE UP (ref 11.5–15.5)
IMM GRANULOCYTES NFR BLD AUTO: 0.5 % — SIGNIFICANT CHANGE UP (ref 0–0.9)
LYMPHOCYTES # BLD AUTO: 2.77 K/UL — SIGNIFICANT CHANGE UP (ref 1–3.3)
LYMPHOCYTES # BLD AUTO: 32.5 % — SIGNIFICANT CHANGE UP (ref 13–44)
MCHC RBC-ENTMCNC: 29.3 PG — SIGNIFICANT CHANGE UP (ref 27–34)
MCHC RBC-ENTMCNC: 33.7 G/DL — SIGNIFICANT CHANGE UP (ref 32–36)
MCV RBC AUTO: 86.9 FL — SIGNIFICANT CHANGE UP (ref 80–100)
MONOCYTES # BLD AUTO: 0.44 K/UL — SIGNIFICANT CHANGE UP (ref 0–0.9)
MONOCYTES NFR BLD AUTO: 5.2 % — SIGNIFICANT CHANGE UP (ref 2–14)
NEUTROPHILS # BLD AUTO: 5.24 K/UL — SIGNIFICANT CHANGE UP (ref 1.8–7.4)
NEUTROPHILS NFR BLD AUTO: 61.5 % — SIGNIFICANT CHANGE UP (ref 43–77)
NRBC # BLD: 0 /100 WBCS — SIGNIFICANT CHANGE UP (ref 0–0)
NRBC BLD-RTO: 0 /100 WBCS — SIGNIFICANT CHANGE UP (ref 0–0)
PLATELET # BLD AUTO: 258 K/UL — SIGNIFICANT CHANGE UP (ref 150–400)
POTASSIUM SERPL-SCNC: 4.4 MMOL/L
PROT SERPL-MCNC: 7.7 G/DL
RBC # BLD: 4.74 M/UL — SIGNIFICANT CHANGE UP (ref 3.8–5.2)
RBC # FLD: 11.9 % — SIGNIFICANT CHANGE UP (ref 10.3–14.5)
SODIUM SERPL-SCNC: 141 MMOL/L
TSH SERPL-ACNC: 1.91 UIU/ML
WBC # BLD: 8.52 K/UL — SIGNIFICANT CHANGE UP (ref 3.8–10.5)
WBC # FLD AUTO: 8.52 K/UL — SIGNIFICANT CHANGE UP (ref 3.8–10.5)

## 2025-02-03 PROCEDURE — G2211 COMPLEX E/M VISIT ADD ON: CPT | Mod: NC

## 2025-02-03 PROCEDURE — 99214 OFFICE O/P EST MOD 30 MIN: CPT

## 2025-02-04 LAB
CANCER AG27-29 SERPL-ACNC: 13.1 U/ML
CEA SERPL-MCNC: 1.3 NG/ML

## 2025-02-06 ENCOUNTER — NON-APPOINTMENT (OUTPATIENT)
Age: 35
End: 2025-02-06

## 2025-02-06 ENCOUNTER — APPOINTMENT (OUTPATIENT)
Dept: INTERNAL MEDICINE | Facility: CLINIC | Age: 35
End: 2025-02-06
Payer: COMMERCIAL

## 2025-02-06 VITALS
OXYGEN SATURATION: 97 % | HEART RATE: 97 BPM | RESPIRATION RATE: 16 BRPM | BODY MASS INDEX: 33.32 KG/M2 | DIASTOLIC BLOOD PRESSURE: 88 MMHG | WEIGHT: 200 LBS | TEMPERATURE: 98.4 F | HEIGHT: 65 IN | SYSTOLIC BLOOD PRESSURE: 132 MMHG

## 2025-02-06 DIAGNOSIS — Z23 ENCOUNTER FOR IMMUNIZATION: ICD-10-CM

## 2025-02-06 DIAGNOSIS — R53.83 OTHER FATIGUE: ICD-10-CM

## 2025-02-06 DIAGNOSIS — Z00.00 ENCOUNTER FOR GENERAL ADULT MEDICAL EXAMINATION W/OUT ABNORMAL FINDINGS: ICD-10-CM

## 2025-02-06 DIAGNOSIS — E55.9 VITAMIN D DEFICIENCY, UNSPECIFIED: ICD-10-CM

## 2025-02-06 PROCEDURE — 93000 ELECTROCARDIOGRAM COMPLETE: CPT

## 2025-02-06 PROCEDURE — G0008: CPT

## 2025-02-06 PROCEDURE — 99213 OFFICE O/P EST LOW 20 MIN: CPT | Mod: 25

## 2025-02-06 PROCEDURE — 99385 PREV VISIT NEW AGE 18-39: CPT | Mod: 25

## 2025-02-06 PROCEDURE — 90656 IIV3 VACC NO PRSV 0.5 ML IM: CPT

## 2025-02-06 RX ORDER — BUTALBITAL, ACETAMINOPHEN AND CAFFEINE 300; 50; 40 MG/1; MG/1; MG/1
50-300-40 CAPSULE ORAL
Refills: 0 | Status: ACTIVE | COMMUNITY

## 2025-02-11 LAB
25(OH)D3 SERPL-MCNC: 34.5 NG/ML
CHOLEST SERPL-MCNC: 328 MG/DL
ESTIMATED AVERAGE GLUCOSE: 111 MG/DL
FERRITIN SERPL-MCNC: 52 NG/ML
FOLATE SERPL-MCNC: 10.7 NG/ML
HBA1C MFR BLD HPLC: 5.5 %
HDLC SERPL-MCNC: 44 MG/DL
IRON SATN MFR SERPL: 12 %
IRON SERPL-MCNC: 48 UG/DL
LDLC SERPL CALC-MCNC: 245 MG/DL
NONHDLC SERPL-MCNC: 284 MG/DL
TIBC SERPL-MCNC: 402 UG/DL
TRIGL SERPL-MCNC: 192 MG/DL
UIBC SERPL-MCNC: 353 UG/DL
VIT B12 SERPL-MCNC: 490 PG/ML

## 2025-02-12 DIAGNOSIS — E78.00 PURE HYPERCHOLESTEROLEMIA, UNSPECIFIED: ICD-10-CM

## 2025-02-19 ENCOUNTER — APPOINTMENT (OUTPATIENT)
Dept: ULTRASOUND IMAGING | Facility: CLINIC | Age: 35
End: 2025-02-19
Payer: COMMERCIAL

## 2025-02-19 ENCOUNTER — OUTPATIENT (OUTPATIENT)
Dept: OUTPATIENT SERVICES | Facility: HOSPITAL | Age: 35
LOS: 1 days | End: 2025-02-19
Payer: COMMERCIAL

## 2025-02-19 ENCOUNTER — RESULT REVIEW (OUTPATIENT)
Age: 35
End: 2025-02-19

## 2025-02-19 DIAGNOSIS — Z90.722 ACQUIRED ABSENCE OF OVARIES, BILATERAL: Chronic | ICD-10-CM

## 2025-02-19 DIAGNOSIS — Z90.11 ACQUIRED ABSENCE OF RIGHT BREAST AND NIPPLE: Chronic | ICD-10-CM

## 2025-02-19 DIAGNOSIS — Z98.890 OTHER SPECIFIED POSTPROCEDURAL STATES: Chronic | ICD-10-CM

## 2025-02-19 DIAGNOSIS — C50.919 MALIGNANT NEOPLASM OF UNSPECIFIED SITE OF UNSPECIFIED FEMALE BREAST: ICD-10-CM

## 2025-02-19 DIAGNOSIS — Z95.828 PRESENCE OF OTHER VASCULAR IMPLANTS AND GRAFTS: Chronic | ICD-10-CM

## 2025-02-19 DIAGNOSIS — Z90.89 ACQUIRED ABSENCE OF OTHER ORGANS: Chronic | ICD-10-CM

## 2025-02-19 PROCEDURE — 76641 ULTRASOUND BREAST COMPLETE: CPT

## 2025-02-19 PROCEDURE — 76641 ULTRASOUND BREAST COMPLETE: CPT | Mod: 26,50

## 2025-02-21 RX ORDER — TIRZEPATIDE 2.5 MG/.5ML
2.5 INJECTION, SOLUTION SUBCUTANEOUS
Qty: 4 | Refills: 0 | Status: ACTIVE | COMMUNITY
Start: 2025-02-12

## 2025-03-13 RX ORDER — TIRZEPATIDE 5 MG/.5ML
5 INJECTION, SOLUTION SUBCUTANEOUS
Qty: 4 | Refills: 0 | Status: ACTIVE | COMMUNITY
Start: 2025-03-13 | End: 1900-01-01

## 2025-04-02 ENCOUNTER — APPOINTMENT (OUTPATIENT)
Dept: CARDIOLOGY | Facility: CLINIC | Age: 35
End: 2025-04-02

## 2025-04-16 NOTE — PATIENT PROFILE ADULT - NSPROMEDSADMININFO_GEN_A_NUR
84-year presents to establish care for Medicare wellness visit follow current medical conditions    Health Maintenance:  Eats a varied and healthy diet.  Gets minimal exercise.  Does not drink, smoke, use illicit substances.  Due for bone density screening and Otherwise up-to-date on all routine health maintenance screenings.  Due for immunizations.  Due for yearly lab work.    1. Medicare annual wellness visit, subsequent    2. Hypercoagulable state (Multi)   On Eliquis twice daily tolerates medication well without bleeding   3. Arteriosclerosis of coronary artery   Goal LDL less than 70 goal blood pressure less than 130/80   4. Essential (primary) hypertension   Goal blood pressure less than 130 currently at goal   5. CHF (congestive heart failure), NYHA class II, acute, diastolic   Currently optimized on medical therapy worsening chest pain or shortness of breath.  Has been having some increased lower extremity edema and worsening shortness of breath.  Utilizes compression stockings and foot elevation that she can.  On Lasix once daily   6. Hypothyroidism, unspecified type   On levothyroxine needs TSH rechecked   7. Asthenia   On duloxetine with small   8. Acute on chronic respiratory failure with hypoxia    9. Chronic obstructive pulmonary disease, unspecified COPD type (Multi)    10. Anxiety due to dementia (Multi) Active   11. Stage 3 chronic kidney disease, unspecified whether stage 3a or 3b CKD (Multi)   Needs kidney function rechecked has been stable recently   12. Chronic heart failure with preserved ejection fraction    13. Degeneration of intervertebral disc of lumbar region with lower extremity pain   On gabapentin 100 mg nightly works well to control symptoms       All pertinent positive symptoms are included in history of present illness.    All other systems have been reviewed and are negative and noncontributory to this patient's current ailments.    CONSTITUTIONAL - INAD. Not ill appearing.  SKIN -  No lesions or rashes visualized. Good skin turgor.  HEENT- Head is atraumatic and normocephalic. Nasal turbinates are nonerythematous and without drainage. .  RESP - CTAB. No wheezing, rhonchi, or crackles.   CARDIAC - RRR. No murmurs, gallops, or rubs.  ABDOMEN - Soft, nontender, nondistended. No organomegaly.  NEURO - CNs 2-12 grossly intact.  PSYCH - Normal mood and affect  EXTREMITIES-+1 pitting edema in the bilateral lower extremities    1. Medicare annual wellness visit, subsequent (Primary)  Cardiovascular disease discussion was had including discussions of chronic medical conditions such as hypertension, hyperlipidemia, CAD, or others. 15 minutes was spent in discussion.  and Depression screening was completed. 5 minutes was spent in this discussion.  Health and wellness topics discussed today.  Recommended eating a varied and healthy diet and made dietary recommendations, also discussed exercise and exercising regularly 30 minutes a day 3 days a week.  Immunizations recommended and updated.  Health screening guidelines discussed with patient including possible things such as colonoscopies, mammograms, prostate screenings, lung cancer screenings, bone densitometry, and other wellness topics.  Yearly lab work ordered or reviewed today.    2. Hypercoagulable state (Multi)  Stable continue Eliquis  - apixaban (Eliquis) 5 mg tablet; Take 1 tablet (5 mg) by mouth 2 times a day.  Dispense: 60 tablet; Refill: 2    3. Arteriosclerosis of coronary artery  Goal LDL less than 70 goal blood pressure less than 130/80 continue medications  - atorvastatin (Lipitor) 40 mg tablet; Take 1 tablet (40 mg) by mouth once daily at bedtime.  Dispense: 90 tablet; Refill: 3    4. Essential (primary) hypertension  Goal blood pressure less than 130 continue medications  - carvedilol (Coreg) 3.125 mg tablet; Take 1 tablet (3.125 mg) by mouth 2 times a day.  Dispense: 180 tablet; Refill: 3    5. CHF (congestive heart failure), NYHA class  II, acute, diastolic  Medically optimized has been experiencing some increasing lower extremity edema, advised okay to take an extra Lasix for up to 5 days for increased lower extremity edema and swelling however watch out for lower blood pressures and increased risk of fall/dizziness  - furosemide (Lasix) 40 mg tablet; Take 1 tablet (40 mg) by mouth once daily.  Dispense: 30 tablet; Refill: 11    6. Hypothyroidism, unspecified type  Last TSH not optimized levothyroxine dosage was recently adjusted by alternative provider continue levothyroxine at new dosage recheck in 3 to 6 months  - levothyroxine (Synthroid, Levoxyl) 50 mcg tablet; Take 1 tablet (50 mcg) by mouth early in the morning.. Take on an empty stomach at the same time each day, either 30 to 60 minutes prior to breakfast  Dispense: 90 tablet; Refill: 1    7. GERD  Well-controlled continue Protonix  - pantoprazole (ProtoNix) 40 mg EC tablet; Take 1 tablet (40 mg) by mouth 2 times a day.  Dispense: 180 tablet; Refill: 3    8. Acute on chronic respiratory failure with hypoxia  Stable continue 2 L oxygen therapy and continue Trelegy albuterol as needed  - Trelegy Ellipta 100-62.5-25 mcg blister with device; Inhale 1 puff once daily.  Dispense: 1 each; Refill: 3    9. Chronic obstructive pulmonary disease, unspecified COPD type (Multi)    - Trelegy Ellipta 100-62.5-25 mcg blister with device; Inhale 1 puff once daily.  Dispense: 1 each; Refill: 3    10. Anxiety due to dementia (Multi)  Well-controlled continue Cymbalta  - DULoxetine (Cymbalta) 20 mg DR capsule; Take 1 capsule (20 mg) by mouth once daily at bedtime. Do not crush or chew.  Dispense: 90 capsule; Refill: 3    11. Stage 3 chronic kidney disease, unspecified whether stage 3a or 3b CKD (Multi)  Continue monitoring most recent kidney function was stable    12. Chronic heart failure with preserved ejection fraction  See above    13. Degeneration of intervertebral disc of lumbar region with lower  extremity pain  Stable well-controlled continue gabapentin  - gabapentin (Neurontin) 100 mg capsule; Take 1 capsule (100 mg) by mouth once daily at bedtime.  Dispense: 90 capsule; Refill: 0       no concerns

## 2025-04-27 NOTE — ASU PREOP CHECKLIST - BLOOD AVAILABLE
The patient's goals for the shift include pain relief    The clinical goals for the shift include pt to remain free from falls until end of shift     yes

## 2025-04-28 ENCOUNTER — APPOINTMENT (OUTPATIENT)
Dept: INTERNAL MEDICINE | Facility: CLINIC | Age: 35
End: 2025-04-28
Payer: COMMERCIAL

## 2025-04-28 VITALS
OXYGEN SATURATION: 97 % | RESPIRATION RATE: 14 BRPM | WEIGHT: 195 LBS | BODY MASS INDEX: 32.49 KG/M2 | SYSTOLIC BLOOD PRESSURE: 120 MMHG | DIASTOLIC BLOOD PRESSURE: 86 MMHG | TEMPERATURE: 98.4 F | HEART RATE: 80 BPM | HEIGHT: 65 IN

## 2025-04-28 DIAGNOSIS — E03.8 OTHER SPECIFIED HYPOTHYROIDISM: ICD-10-CM

## 2025-04-28 PROCEDURE — 99214 OFFICE O/P EST MOD 30 MIN: CPT

## 2025-04-28 RX ORDER — TIRZEPATIDE 7.5 MG/.5ML
7.5 INJECTION, SOLUTION SUBCUTANEOUS
Qty: 4 | Refills: 1 | Status: ACTIVE | COMMUNITY
Start: 2025-04-28 | End: 1900-01-01

## 2025-04-29 LAB
ALBUMIN SERPL ELPH-MCNC: 5.2 G/DL
ALP BLD-CCNC: 115 U/L
ALT SERPL-CCNC: 20 U/L
ANION GAP SERPL CALC-SCNC: 15 MMOL/L
AST SERPL-CCNC: 25 U/L
BILIRUB SERPL-MCNC: 0.4 MG/DL
BUN SERPL-MCNC: 11 MG/DL
CALCIUM SERPL-MCNC: 10.3 MG/DL
CHLORIDE SERPL-SCNC: 100 MMOL/L
CHOLEST SERPL-MCNC: 284 MG/DL
CO2 SERPL-SCNC: 26 MMOL/L
CREAT SERPL-MCNC: 0.75 MG/DL
EGFRCR SERPLBLD CKD-EPI 2021: 106 ML/MIN/1.73M2
ESTIMATED AVERAGE GLUCOSE: 108 MG/DL
GLUCOSE SERPL-MCNC: 83 MG/DL
HBA1C MFR BLD HPLC: 5.4 %
HDLC SERPL-MCNC: 42 MG/DL
LDLC SERPL-MCNC: 203 MG/DL
NONHDLC SERPL-MCNC: 243 MG/DL
POTASSIUM SERPL-SCNC: 4.6 MMOL/L
PROT SERPL-MCNC: 7.9 G/DL
SODIUM SERPL-SCNC: 141 MMOL/L
T4 FREE SERPL-MCNC: 1.1 NG/DL
TRIGL SERPL-MCNC: 203 MG/DL
TSH SERPL-ACNC: 2.63 UIU/ML

## 2025-05-06 ENCOUNTER — OUTPATIENT (OUTPATIENT)
Dept: OUTPATIENT SERVICES | Facility: HOSPITAL | Age: 35
LOS: 1 days | Discharge: HOME | End: 2025-05-06

## 2025-05-06 DIAGNOSIS — Z90.89 ACQUIRED ABSENCE OF OTHER ORGANS: Chronic | ICD-10-CM

## 2025-05-06 DIAGNOSIS — Z90.11 ACQUIRED ABSENCE OF RIGHT BREAST AND NIPPLE: Chronic | ICD-10-CM

## 2025-05-06 DIAGNOSIS — Z95.828 PRESENCE OF OTHER VASCULAR IMPLANTS AND GRAFTS: Chronic | ICD-10-CM

## 2025-05-06 DIAGNOSIS — Z98.890 OTHER SPECIFIED POSTPROCEDURAL STATES: Chronic | ICD-10-CM

## 2025-05-06 DIAGNOSIS — Z90.722 ACQUIRED ABSENCE OF OVARIES, BILATERAL: Chronic | ICD-10-CM

## 2025-05-06 DIAGNOSIS — C50.919 MALIGNANT NEOPLASM OF UNSPECIFIED SITE OF UNSPECIFIED FEMALE BREAST: ICD-10-CM

## 2025-05-09 ENCOUNTER — NON-APPOINTMENT (OUTPATIENT)
Age: 35
End: 2025-05-09

## 2025-05-09 ENCOUNTER — APPOINTMENT (OUTPATIENT)
Dept: CARDIOLOGY | Facility: CLINIC | Age: 35
End: 2025-05-09
Payer: COMMERCIAL

## 2025-05-09 VITALS
OXYGEN SATURATION: 96 % | DIASTOLIC BLOOD PRESSURE: 81 MMHG | HEIGHT: 65 IN | HEART RATE: 97 BPM | SYSTOLIC BLOOD PRESSURE: 113 MMHG

## 2025-05-09 DIAGNOSIS — E78.49 OTHER HYPERLIPIDEMIA: ICD-10-CM

## 2025-05-09 DIAGNOSIS — E78.00 PURE HYPERCHOLESTEROLEMIA, UNSPECIFIED: ICD-10-CM

## 2025-05-09 PROCEDURE — 99214 OFFICE O/P EST MOD 30 MIN: CPT | Mod: 25

## 2025-05-09 PROCEDURE — 93000 ELECTROCARDIOGRAM COMPLETE: CPT

## 2025-05-09 RX ORDER — BEMPEDOIC ACID 180 MG/1
180 TABLET, FILM COATED ORAL
Qty: 90 | Refills: 3 | Status: ACTIVE | COMMUNITY
Start: 2025-05-09 | End: 1900-01-01

## 2025-05-12 ENCOUNTER — RESULT REVIEW (OUTPATIENT)
Age: 35
End: 2025-05-12

## 2025-05-12 ENCOUNTER — APPOINTMENT (OUTPATIENT)
Dept: HEMATOLOGY ONCOLOGY | Facility: CLINIC | Age: 35
End: 2025-05-12
Payer: COMMERCIAL

## 2025-05-12 VITALS
HEART RATE: 82 BPM | DIASTOLIC BLOOD PRESSURE: 89 MMHG | OXYGEN SATURATION: 98 % | HEIGHT: 65 IN | SYSTOLIC BLOOD PRESSURE: 131 MMHG | RESPIRATION RATE: 16 BRPM | WEIGHT: 195.88 LBS | TEMPERATURE: 97.3 F | BODY MASS INDEX: 32.64 KG/M2

## 2025-05-12 LAB
ALBUMIN SERPL ELPH-MCNC: 5.3 G/DL
ALP BLD-CCNC: 118 U/L
ALT SERPL-CCNC: 27 U/L
ANION GAP SERPL CALC-SCNC: 16 MMOL/L
AST SERPL-CCNC: 32 U/L
BASOPHILS # BLD AUTO: 0.02 K/UL — SIGNIFICANT CHANGE UP (ref 0–0.2)
BASOPHILS NFR BLD AUTO: 0.3 % — SIGNIFICANT CHANGE UP (ref 0–2)
BILIRUB SERPL-MCNC: 0.3 MG/DL
BUN SERPL-MCNC: 9 MG/DL
CALCIUM SERPL-MCNC: 10.7 MG/DL
CEA SERPL-MCNC: 1.3 NG/ML
CHLORIDE SERPL-SCNC: 103 MMOL/L
CO2 SERPL-SCNC: 23 MMOL/L
CREAT SERPL-MCNC: 0.66 MG/DL
EGFRCR SERPLBLD CKD-EPI 2021: 117 ML/MIN/1.73M2
EOSINOPHIL # BLD AUTO: 0.12 K/UL — SIGNIFICANT CHANGE UP (ref 0–0.5)
EOSINOPHIL NFR BLD AUTO: 1.7 % — SIGNIFICANT CHANGE UP (ref 0–6)
GLUCOSE SERPL-MCNC: 82 MG/DL
HCT VFR BLD CALC: 43.4 % — SIGNIFICANT CHANGE UP (ref 34.5–45)
HGB BLD-MCNC: 14.7 G/DL — SIGNIFICANT CHANGE UP (ref 11.5–15.5)
IMM GRANULOCYTES NFR BLD AUTO: 0.3 % — SIGNIFICANT CHANGE UP (ref 0–0.9)
LYMPHOCYTES # BLD AUTO: 2.61 K/UL — SIGNIFICANT CHANGE UP (ref 1–3.3)
LYMPHOCYTES # BLD AUTO: 36.6 % — SIGNIFICANT CHANGE UP (ref 13–44)
MCHC RBC-ENTMCNC: 30.1 PG — SIGNIFICANT CHANGE UP (ref 27–34)
MCHC RBC-ENTMCNC: 33.9 G/DL — SIGNIFICANT CHANGE UP (ref 32–36)
MCV RBC AUTO: 88.8 FL — SIGNIFICANT CHANGE UP (ref 80–100)
MONOCYTES # BLD AUTO: 0.39 K/UL — SIGNIFICANT CHANGE UP (ref 0–0.9)
MONOCYTES NFR BLD AUTO: 5.5 % — SIGNIFICANT CHANGE UP (ref 2–14)
NEUTROPHILS # BLD AUTO: 3.97 K/UL — SIGNIFICANT CHANGE UP (ref 1.8–7.4)
NEUTROPHILS NFR BLD AUTO: 55.6 % — SIGNIFICANT CHANGE UP (ref 43–77)
NRBC BLD AUTO-RTO: 0 /100 WBCS — SIGNIFICANT CHANGE UP (ref 0–0)
PLATELET # BLD AUTO: 292 K/UL — SIGNIFICANT CHANGE UP (ref 150–400)
POTASSIUM SERPL-SCNC: 4.3 MMOL/L
PROT SERPL-MCNC: 8.2 G/DL
RBC # BLD: 4.89 M/UL — SIGNIFICANT CHANGE UP (ref 3.8–5.2)
RBC # FLD: 12.6 % — SIGNIFICANT CHANGE UP (ref 10.3–14.5)
SODIUM SERPL-SCNC: 142 MMOL/L
WBC # BLD: 7.13 K/UL — SIGNIFICANT CHANGE UP (ref 3.8–10.5)
WBC # FLD AUTO: 7.13 K/UL — SIGNIFICANT CHANGE UP (ref 3.8–10.5)

## 2025-05-12 PROCEDURE — 99214 OFFICE O/P EST MOD 30 MIN: CPT

## 2025-05-12 PROCEDURE — G2211 COMPLEX E/M VISIT ADD ON: CPT | Mod: NC

## 2025-05-13 LAB — CANCER AG27-29 SERPL-ACNC: 12.1 U/ML

## 2025-05-20 ENCOUNTER — APPOINTMENT (OUTPATIENT)
Dept: OBGYN | Facility: CLINIC | Age: 35
End: 2025-05-20
Payer: COMMERCIAL

## 2025-05-20 VITALS
BODY MASS INDEX: 32.32 KG/M2 | HEIGHT: 65 IN | WEIGHT: 194 LBS | SYSTOLIC BLOOD PRESSURE: 119 MMHG | DIASTOLIC BLOOD PRESSURE: 88 MMHG

## 2025-05-20 DIAGNOSIS — N95.2 POSTMENOPAUSAL ATROPHIC VAGINITIS: ICD-10-CM

## 2025-05-20 DIAGNOSIS — R23.2 FLUSHING: ICD-10-CM

## 2025-05-20 DIAGNOSIS — Z12.4 ENCOUNTER FOR SCREENING FOR MALIGNANT NEOPLASM OF CERVIX: ICD-10-CM

## 2025-05-20 DIAGNOSIS — Z78.0 ASYMPTOMATIC MENOPAUSAL STATE: ICD-10-CM

## 2025-05-20 DIAGNOSIS — Z00.00 ENCOUNTER FOR GENERAL ADULT MEDICAL EXAMINATION W/OUT ABNORMAL FINDINGS: ICD-10-CM

## 2025-05-20 DIAGNOSIS — G47.00 INSOMNIA, UNSPECIFIED: ICD-10-CM

## 2025-05-20 DIAGNOSIS — E28.319 ASYMPTOMATIC PREMATURE MENOPAUSE: ICD-10-CM

## 2025-05-20 DIAGNOSIS — C50.919 MALIGNANT NEOPLASM OF UNSPECIFIED SITE OF UNSPECIFIED FEMALE BREAST: ICD-10-CM

## 2025-05-20 DIAGNOSIS — R61 GENERALIZED HYPERHIDROSIS: ICD-10-CM

## 2025-05-20 PROCEDURE — 99385 PREV VISIT NEW AGE 18-39: CPT

## 2025-05-22 LAB — HPV HIGH+LOW RISK DNA PNL CVX: NOT DETECTED

## 2025-05-27 ENCOUNTER — RESULT REVIEW (OUTPATIENT)
Age: 35
End: 2025-05-27

## 2025-05-28 ENCOUNTER — APPOINTMENT (OUTPATIENT)
Dept: CT IMAGING | Facility: CLINIC | Age: 35
End: 2025-05-28
Payer: COMMERCIAL

## 2025-05-28 ENCOUNTER — OUTPATIENT (OUTPATIENT)
Dept: OUTPATIENT SERVICES | Facility: HOSPITAL | Age: 35
LOS: 1 days | End: 2025-05-28
Payer: COMMERCIAL

## 2025-05-28 DIAGNOSIS — C50.919 MALIGNANT NEOPLASM OF UNSPECIFIED SITE OF UNSPECIFIED FEMALE BREAST: ICD-10-CM

## 2025-05-28 DIAGNOSIS — Z90.89 ACQUIRED ABSENCE OF OTHER ORGANS: Chronic | ICD-10-CM

## 2025-05-28 DIAGNOSIS — Z95.828 PRESENCE OF OTHER VASCULAR IMPLANTS AND GRAFTS: Chronic | ICD-10-CM

## 2025-05-28 DIAGNOSIS — Z90.11 ACQUIRED ABSENCE OF RIGHT BREAST AND NIPPLE: Chronic | ICD-10-CM

## 2025-05-28 DIAGNOSIS — Z98.890 OTHER SPECIFIED POSTPROCEDURAL STATES: Chronic | ICD-10-CM

## 2025-05-28 DIAGNOSIS — Z90.722 ACQUIRED ABSENCE OF OVARIES, BILATERAL: Chronic | ICD-10-CM

## 2025-05-28 PROCEDURE — 71260 CT THORAX DX C+: CPT | Mod: 26

## 2025-05-28 PROCEDURE — 74177 CT ABD & PELVIS W/CONTRAST: CPT | Mod: 26

## 2025-05-28 PROCEDURE — 71260 CT THORAX DX C+: CPT

## 2025-05-28 PROCEDURE — 74177 CT ABD & PELVIS W/CONTRAST: CPT

## 2025-05-30 ENCOUNTER — NON-APPOINTMENT (OUTPATIENT)
Age: 35
End: 2025-05-30

## 2025-05-30 DIAGNOSIS — E83.52 HYPERCALCEMIA: ICD-10-CM

## 2025-05-30 LAB — CYTOLOGY CVX/VAG DOC THIN PREP: ABNORMAL

## 2025-06-04 ENCOUNTER — RESULT REVIEW (OUTPATIENT)
Age: 35
End: 2025-06-04

## 2025-06-04 ENCOUNTER — APPOINTMENT (OUTPATIENT)
Dept: HEMATOLOGY ONCOLOGY | Facility: CLINIC | Age: 35
End: 2025-06-04

## 2025-06-04 LAB
BASOPHILS # BLD AUTO: 0.03 K/UL — SIGNIFICANT CHANGE UP (ref 0–0.2)
BASOPHILS NFR BLD AUTO: 0.5 % — SIGNIFICANT CHANGE UP (ref 0–2)
EOSINOPHIL # BLD AUTO: 0.13 K/UL — SIGNIFICANT CHANGE UP (ref 0–0.5)
EOSINOPHIL NFR BLD AUTO: 2 % — SIGNIFICANT CHANGE UP (ref 0–6)
HCT VFR BLD CALC: 40.4 % — SIGNIFICANT CHANGE UP (ref 34.5–45)
HGB BLD-MCNC: 13.6 G/DL — SIGNIFICANT CHANGE UP (ref 11.5–15.5)
IMM GRANULOCYTES NFR BLD AUTO: 0.3 % — SIGNIFICANT CHANGE UP (ref 0–0.9)
LYMPHOCYTES # BLD AUTO: 2.1 K/UL — SIGNIFICANT CHANGE UP (ref 1–3.3)
LYMPHOCYTES # BLD AUTO: 33.1 % — SIGNIFICANT CHANGE UP (ref 13–44)
MCHC RBC-ENTMCNC: 30.3 PG — SIGNIFICANT CHANGE UP (ref 27–34)
MCHC RBC-ENTMCNC: 33.7 G/DL — SIGNIFICANT CHANGE UP (ref 32–36)
MCV RBC AUTO: 90 FL — SIGNIFICANT CHANGE UP (ref 80–100)
MONOCYTES # BLD AUTO: 0.48 K/UL — SIGNIFICANT CHANGE UP (ref 0–0.9)
MONOCYTES NFR BLD AUTO: 7.6 % — SIGNIFICANT CHANGE UP (ref 2–14)
NEUTROPHILS # BLD AUTO: 3.59 K/UL — SIGNIFICANT CHANGE UP (ref 1.8–7.4)
NEUTROPHILS NFR BLD AUTO: 56.5 % — SIGNIFICANT CHANGE UP (ref 43–77)
NRBC BLD AUTO-RTO: 0 /100 WBCS — SIGNIFICANT CHANGE UP (ref 0–0)
PLATELET # BLD AUTO: 275 K/UL — SIGNIFICANT CHANGE UP (ref 150–400)
RBC # BLD: 4.49 M/UL — SIGNIFICANT CHANGE UP (ref 3.8–5.2)
RBC # FLD: 12.7 % — SIGNIFICANT CHANGE UP (ref 10.3–14.5)
WBC # BLD: 6.35 K/UL — SIGNIFICANT CHANGE UP (ref 3.8–10.5)
WBC # FLD AUTO: 6.35 K/UL — SIGNIFICANT CHANGE UP (ref 3.8–10.5)

## 2025-06-05 LAB
ALBUMIN SERPL ELPH-MCNC: 5.1 G/DL
ALP BLD-CCNC: 104 U/L
ALT SERPL-CCNC: 20 U/L
ANION GAP SERPL CALC-SCNC: 15 MMOL/L
AST SERPL-CCNC: 26 U/L
BILIRUB SERPL-MCNC: 0.6 MG/DL
BUN SERPL-MCNC: 13 MG/DL
CALCIUM SERPL-MCNC: 10.4 MG/DL
CANCER AG27-29 SERPL-ACNC: 12.5 U/ML
CEA SERPL-MCNC: 1.2 NG/ML
CHLORIDE SERPL-SCNC: 100 MMOL/L
CO2 SERPL-SCNC: 25 MMOL/L
CREAT SERPL-MCNC: 0.73 MG/DL
EGFRCR SERPLBLD CKD-EPI 2021: 109 ML/MIN/1.73M2
GLUCOSE SERPL-MCNC: 84 MG/DL
POTASSIUM SERPL-SCNC: 4.7 MMOL/L
PROT SERPL-MCNC: 7.7 G/DL
SODIUM SERPL-SCNC: 140 MMOL/L

## 2025-06-19 RX ORDER — TIRZEPATIDE 10 MG/.5ML
10 INJECTION, SOLUTION SUBCUTANEOUS
Qty: 4 | Refills: 2 | Status: ACTIVE | COMMUNITY
Start: 2025-06-18

## 2025-07-10 ENCOUNTER — APPOINTMENT (OUTPATIENT)
Dept: OBGYN | Facility: CLINIC | Age: 35
End: 2025-07-10

## 2025-08-06 ENCOUNTER — APPOINTMENT (OUTPATIENT)
Dept: INTERNAL MEDICINE | Facility: CLINIC | Age: 35
End: 2025-08-06
Payer: COMMERCIAL

## 2025-08-06 VITALS
RESPIRATION RATE: 14 BRPM | OXYGEN SATURATION: 97 % | SYSTOLIC BLOOD PRESSURE: 120 MMHG | HEIGHT: 65 IN | HEART RATE: 90 BPM | TEMPERATURE: 97.8 F | BODY MASS INDEX: 29.99 KG/M2 | DIASTOLIC BLOOD PRESSURE: 80 MMHG | WEIGHT: 180 LBS

## 2025-08-06 DIAGNOSIS — E78.49 OTHER HYPERLIPIDEMIA: ICD-10-CM

## 2025-08-06 DIAGNOSIS — E78.00 PURE HYPERCHOLESTEROLEMIA, UNSPECIFIED: ICD-10-CM

## 2025-08-06 PROCEDURE — 99214 OFFICE O/P EST MOD 30 MIN: CPT

## 2025-08-06 RX ORDER — TIRZEPATIDE 12.5 MG/.5ML
12.5 INJECTION, SOLUTION SUBCUTANEOUS WEEKLY
Qty: 1 | Refills: 2 | Status: ACTIVE | COMMUNITY
Start: 2025-08-06 | End: 1900-01-01

## 2025-08-07 LAB
ALBUMIN SERPL ELPH-MCNC: 5.1 G/DL
ALP BLD-CCNC: 102 U/L
ALT SERPL-CCNC: 28 U/L
ANION GAP SERPL CALC-SCNC: 15 MMOL/L
AST SERPL-CCNC: 33 U/L
BILIRUB SERPL-MCNC: 0.5 MG/DL
BUN SERPL-MCNC: 16 MG/DL
CALCIUM SERPL-MCNC: 10.6 MG/DL
CHLORIDE SERPL-SCNC: 104 MMOL/L
CHOLEST SERPL-MCNC: 190 MG/DL
CO2 SERPL-SCNC: 23 MMOL/L
CREAT SERPL-MCNC: 0.87 MG/DL
EGFRCR SERPLBLD CKD-EPI 2021: 88 ML/MIN/1.73M2
ESTIMATED AVERAGE GLUCOSE: 97 MG/DL
GLUCOSE SERPL-MCNC: 87 MG/DL
HBA1C MFR BLD HPLC: 5 %
HDLC SERPL-MCNC: 44 MG/DL
LDLC SERPL-MCNC: 123 MG/DL
NONHDLC SERPL-MCNC: 146 MG/DL
POTASSIUM SERPL-SCNC: 4.3 MMOL/L
PROT SERPL-MCNC: 7.7 G/DL
SODIUM SERPL-SCNC: 142 MMOL/L
T4 FREE SERPL-MCNC: 1.2 NG/DL
TRIGL SERPL-MCNC: 133 MG/DL
TSH SERPL-ACNC: 5.86 UIU/ML

## 2025-08-12 ENCOUNTER — APPOINTMENT (OUTPATIENT)
Dept: AFTER HOURS CARE | Facility: EMERGENCY ROOM | Age: 35
End: 2025-08-12
Payer: COMMERCIAL

## 2025-08-12 PROCEDURE — 99215 OFFICE O/P EST HI 40 MIN: CPT | Mod: 95

## 2025-09-02 ENCOUNTER — APPOINTMENT (OUTPATIENT)
Dept: OBGYN | Facility: CLINIC | Age: 35
End: 2025-09-02

## 2025-09-02 DIAGNOSIS — N94.10 UNSPECIFIED DYSPAREUNIA: ICD-10-CM

## 2025-09-02 DIAGNOSIS — Z12.4 ENCOUNTER FOR SCREENING FOR MALIGNANT NEOPLASM OF CERVIX: ICD-10-CM

## 2025-09-02 DIAGNOSIS — E28.319 ASYMPTOMATIC PREMATURE MENOPAUSE: ICD-10-CM

## 2025-09-02 DIAGNOSIS — Z78.0 ASYMPTOMATIC MENOPAUSAL STATE: ICD-10-CM

## 2025-09-02 DIAGNOSIS — N95.8 OTHER SPECIFIED MENOPAUSAL AND PERIMENOPAUSAL DISORDERS: ICD-10-CM

## 2025-09-02 DIAGNOSIS — N95.2 POSTMENOPAUSAL ATROPHIC VAGINITIS: ICD-10-CM

## 2025-09-02 DIAGNOSIS — Z85.3 PERSONAL HISTORY OF MALIGNANT NEOPLASM OF BREAST: ICD-10-CM

## 2025-09-02 LAB
APPEARANCE: CLEAR
BILIRUBIN URINE: NEGATIVE
BLOOD URINE: ABNORMAL
COLOR: YELLOW
GLUCOSE QUALITATIVE U: NEGATIVE
KETONES URINE: NEGATIVE
LEUKOCYTE ESTERASE URINE: ABNORMAL
NITRITE URINE: NEGATIVE
PH URINE: 5.5
PROTEIN URINE: NEGATIVE
SPECIFIC GRAVITY URINE: <=1.005
UROBILINOGEN URINE: 0.2 (ref 0.2–?)

## 2025-09-02 PROCEDURE — 99204 OFFICE O/P NEW MOD 45 MIN: CPT

## 2025-09-03 PROBLEM — N95.8 GENITOURINARY SYNDROME OF MENOPAUSE: Status: ACTIVE | Noted: 2025-09-03

## 2025-09-03 PROBLEM — N94.10 DYSPAREUNIA, FEMALE: Status: ACTIVE | Noted: 2025-09-03

## 2025-09-03 LAB
CANDIDA VAG CYTO: NOT DETECTED
G VAGINALIS+PREV SP MTYP VAG QL MICRO: NOT DETECTED
HPV HIGH+LOW RISK DNA PNL CVX: NOT DETECTED
T VAGINALIS VAG QL WET PREP: NOT DETECTED

## 2025-09-05 ENCOUNTER — OUTPATIENT (OUTPATIENT)
Dept: OUTPATIENT SERVICES | Facility: HOSPITAL | Age: 35
LOS: 1 days | End: 2025-09-05
Payer: COMMERCIAL

## 2025-09-05 ENCOUNTER — APPOINTMENT (OUTPATIENT)
Dept: NUCLEAR MEDICINE | Facility: CLINIC | Age: 35
End: 2025-09-05
Payer: COMMERCIAL

## 2025-09-05 DIAGNOSIS — Z98.890 OTHER SPECIFIED POSTPROCEDURAL STATES: Chronic | ICD-10-CM

## 2025-09-05 DIAGNOSIS — E83.52 HYPERCALCEMIA: ICD-10-CM

## 2025-09-05 DIAGNOSIS — Z90.722 ACQUIRED ABSENCE OF OVARIES, BILATERAL: Chronic | ICD-10-CM

## 2025-09-05 DIAGNOSIS — C50.919 MALIGNANT NEOPLASM OF UNSPECIFIED SITE OF UNSPECIFIED FEMALE BREAST: ICD-10-CM

## 2025-09-05 DIAGNOSIS — Z95.828 PRESENCE OF OTHER VASCULAR IMPLANTS AND GRAFTS: Chronic | ICD-10-CM

## 2025-09-05 DIAGNOSIS — Z90.89 ACQUIRED ABSENCE OF OTHER ORGANS: Chronic | ICD-10-CM

## 2025-09-05 DIAGNOSIS — Z90.11 ACQUIRED ABSENCE OF RIGHT BREAST AND NIPPLE: Chronic | ICD-10-CM

## 2025-09-05 PROCEDURE — A9552: CPT

## 2025-09-05 PROCEDURE — 78815 PET IMAGE W/CT SKULL-THIGH: CPT | Mod: 26,PI

## 2025-09-05 PROCEDURE — 78815 PET IMAGE W/CT SKULL-THIGH: CPT

## 2025-09-08 LAB — CYTOLOGY CVX/VAG DOC THIN PREP: NORMAL

## 2025-09-15 ENCOUNTER — APPOINTMENT (OUTPATIENT)
Dept: HEMATOLOGY ONCOLOGY | Facility: CLINIC | Age: 35
End: 2025-09-15
Payer: COMMERCIAL

## 2025-09-15 VITALS
BODY MASS INDEX: 29.35 KG/M2 | SYSTOLIC BLOOD PRESSURE: 103 MMHG | TEMPERATURE: 97.7 F | OXYGEN SATURATION: 98 % | HEART RATE: 70 BPM | DIASTOLIC BLOOD PRESSURE: 70 MMHG | WEIGHT: 176.37 LBS | RESPIRATION RATE: 13 BRPM

## 2025-09-15 DIAGNOSIS — C50.919 MALIGNANT NEOPLASM OF UNSPECIFIED SITE OF UNSPECIFIED FEMALE BREAST: ICD-10-CM

## 2025-09-15 PROCEDURE — 99214 OFFICE O/P EST MOD 30 MIN: CPT

## 2025-09-15 PROCEDURE — G2211 COMPLEX E/M VISIT ADD ON: CPT | Mod: NC

## (undated) DEVICE — SYR LUER LOK 50CC

## (undated) DEVICE — SUT VICRYL 2-0 27" SH UNDYED

## (undated) DEVICE — TUBING IRR SET FOR CYSTOSCOPY 77"

## (undated) DEVICE — DRAPE TOWEL BLUE 17" X 24"

## (undated) DEVICE — LAP PAD 18 X 18"

## (undated) DEVICE — DRAPE MAYO STAND 30"

## (undated) DEVICE — DRSG KERLIX ROLL 4.5"

## (undated) DEVICE — Device

## (undated) DEVICE — TUBING CANNULA SALTER LABS NASAL ADULT 7FT

## (undated) DEVICE — MASK O2 NON REBREATH 3IN1 ADULT

## (undated) DEVICE — DRAPE 1/2 SHEET 40X57"

## (undated) DEVICE — CANNULA ANT CHMBR 27GX22MM

## (undated) DEVICE — DRSG BIOPATCH DISK W CHG 1" W 7.0MM HOLE

## (undated) DEVICE — DRAPE IOBAN 23" X 23"

## (undated) DEVICE — SUT PLAIN GUT FAST ABSORBING 5-0 PC-1

## (undated) DEVICE — SPECIMEN CONTAINER 100ML

## (undated) DEVICE — DRAPE 3/4 SHEET 52X76"

## (undated) DEVICE — CANISTER DISPOSABLE THIN WALL 3000CC

## (undated) DEVICE — SUT VICRYL 3-0 27" SH UNDYED

## (undated) DEVICE — SYR IV POSIFLUSH NS 3ML 30/TY

## (undated) DEVICE — SUT QUILL MONODERM 2-0 30CM 19MM

## (undated) DEVICE — ELCTR BOVIE PENCIL BLADE 10FT

## (undated) DEVICE — MEDTRONIC RADIALUX LIGHTED RETRACTOR DISP

## (undated) DEVICE — SUT VICRYL 3-0 18" PS-2 UNDYED

## (undated) DEVICE — SOL IRR POUR NS 0.9% 1500ML

## (undated) DEVICE — VACUUM CURETTE BERKLEY OLYMPUS STRAIGHT 11MM

## (undated) DEVICE — SET IV PUMP BLOOD 1VALVE 180FILTER NON-DEHP

## (undated) DEVICE — SOL IRR POUR H2O 500ML

## (undated) DEVICE — ELCTR BOVIE PENCIL SMOKE EVACUATION

## (undated) DEVICE — DRAPE MAGNETIC INSTRUMENT MEDIUM

## (undated) DEVICE — LABELS BLANK W PEN

## (undated) DEVICE — SUT POLYSORB 2-0 30" V-20 UNDYED

## (undated) DEVICE — DRAIN RESERVOIR FOR JACKSON PRATT 100CC CARDINAL

## (undated) DEVICE — SUT SILK 2-0 18" FS

## (undated) DEVICE — SUT SURGIPRO II 2-0 30" GS-21

## (undated) DEVICE — DRSG MAMMARY SUPPORT XL SIZE 5

## (undated) DEVICE — DRAPE LAPAROTOMY TRANSVERSE

## (undated) DEVICE — DRSG DERMABOND 0.7ML

## (undated) DEVICE — FORCEP RADIAL JAW 4 JUMBO 2.8MM 3.2MM 240CM ORANGE DISP

## (undated) DEVICE — DOPPLER PROBE  CABLE

## (undated) DEVICE — SUT MONOCRYL 3-0 27" PS-2 UNDYED

## (undated) DEVICE — CLAMP MICROVASCULAR SINGLE  1-2MM

## (undated) DEVICE — DRSG DERMABOND PRINEO 60CM

## (undated) DEVICE — DRAPE UNDER BUTTOCKS W SCREEN

## (undated) DEVICE — POSITIONER STRAP ARMBOARD VELCRO TS-30

## (undated) DEVICE — DRSG TEGADERM 4X4.75"

## (undated) DEVICE — SUT QUILL POLYPROPYLENE 2 24CM 36MM

## (undated) DEVICE — TRAP QUICK CATCH  SINGL CHAMBER

## (undated) DEVICE — TOURNIQUET ESMARK 4"

## (undated) DEVICE — DRAPE SPLIT SHEET 77" X 108"

## (undated) DEVICE — RETRIEVER ROTH NET PLATINUM-UNIVERSAL

## (undated) DEVICE — PREP BETADINE SPONGE STICKS

## (undated) DEVICE — SUT PLAIN GUT 5-0 18" P-3

## (undated) DEVICE — TUBING HI-VAC PSI-TEC STERILE

## (undated) DEVICE — CLAMP MICROVASCULAR DOUBLE  1-2MM

## (undated) DEVICE — VACUUM CURETTE BERKLEY OLYMPUS CURVED 8MM

## (undated) DEVICE — SAFETY PIN

## (undated) DEVICE — SUT POLYSORB 0 30" GS-23

## (undated) DEVICE — SUT PROLENE 6-0 18" P-3

## (undated) DEVICE — TUBING INFILTRATION

## (undated) DEVICE — SENTIMAG MAGTRACE TRACER INJECT MAGNETIC 2ML VIAL

## (undated) DEVICE — SOL IRR POUR H2O 1500ML

## (undated) DEVICE — VENODYNE/SCD SLEEVE CALF MEDIUM

## (undated) DEVICE — DRAPE INSTRUMENT POUCH 6.75" X 11"

## (undated) DEVICE — ELCTR GROUNDING PAD ADULT COVIDIEN

## (undated) DEVICE — BRA PINK XL 39-42"

## (undated) DEVICE — WARMING BLANKET LOWER ADULT

## (undated) DEVICE — WARMING BLANKET FULL ADULT

## (undated) DEVICE — TUBE O2 SUPL CRUSH RESIS CONN SOUTHSIDE ONLY

## (undated) DEVICE — LIGASURE SMALL JAW

## (undated) DEVICE — TUBING IV SET SECONDARY 34"

## (undated) DEVICE — LIGASURE BLUNT TIP 37CM

## (undated) DEVICE — DRSG MASTISOL

## (undated) DEVICE — SUT POLYSORB 0 30" GS-21 UNDYED

## (undated) DEVICE — SUT POLYSORB 2-0 36" GS-21 UNDYED

## (undated) DEVICE — DRAPE MICROSCOPE ZEISS OPMI 41X64"

## (undated) DEVICE — PREP ALCOHOL PAD MED

## (undated) DEVICE — CATH ELECHMSTAT  INJ 7FR 210CM

## (undated) DEVICE — SNARE POLYP SENS 27MM 240CM

## (undated) DEVICE — CLAMP BX HOT RAD JAW 3

## (undated) DEVICE — SUT BIOSYN 3-0 18" P-12

## (undated) DEVICE — DRAIN JACKSON PRATT 10MM FLAT FULL 15FR TROCAR

## (undated) DEVICE — DRSG XEROFORM 5 X 9"

## (undated) DEVICE — TUBING OLYMPUS INSUFFLATION

## (undated) DEVICE — SENSOR VIOPTIX TISSUE OXIMETER DISP

## (undated) DEVICE — ABDOMINAL BINDER MED/LG 9" X 36"-64"

## (undated) DEVICE — GOWN LG

## (undated) DEVICE — PACK IV START WITH CHG

## (undated) DEVICE — TROCAR COVIDIEN BLUNT TIP HASSAN 10MM

## (undated) DEVICE — SOL IRR POUR NS 0.9% 500ML

## (undated) DEVICE — STAPLER SKIN VISI-STAT 35 WIDE

## (undated) DEVICE — DRSG TELFA .25 X 3

## (undated) DEVICE — ENDOCATCH 10MM SPECIMEN POUCH

## (undated) DEVICE — SUT QUILL PDO 2-0 24CM 26MM

## (undated) DEVICE — DRSG COMBINE 5X9"

## (undated) DEVICE — ENDOCUFF VISION SZ 3 SM PRPL

## (undated) DEVICE — MARKER ENDO SPOT EX

## (undated) DEVICE — POSITIONER FOAM EGG CRATE ULNAR 2PCS (PINK)

## (undated) DEVICE — SUT MONOCRYL 3-0 18" PS-2 UNDYED

## (undated) DEVICE — GLV 7 PROTEXIS (WHITE)

## (undated) DEVICE — CATH IV SAFE BC 22G X 1" (BLUE)

## (undated) DEVICE — WARMING BLANKET UPPER ADULT

## (undated) DEVICE — UTERINE MANIPULATOR COOPER SURGICAL 5MM 33CM GREEN

## (undated) DEVICE — DRAPE CV 106" X 135"

## (undated) DEVICE — TUBING SUCTION CONN 6FT STERILE

## (undated) DEVICE — POSITIONER PINK PAD PIGAZZI SYSTEM

## (undated) DEVICE — VACUUM CURETTE BERKLEY OLYMPUS STRAIGHT 12MM

## (undated) DEVICE — MASK LRG MED AND HIGH O2 CONC M TO M 10FT

## (undated) DEVICE — GLV 6.5 PROTEXIS W HYDROGEL

## (undated) DEVICE — POSITIONER PURPLE ARM ONE STEP (LARGE)

## (undated) DEVICE — ELCTR BOVIE TIP BLADE INSULATED 2.75" EDGE

## (undated) DEVICE — SYR LUER SLIP TIP 30CC

## (undated) DEVICE — FORMALIN CUPS 10% BUFFERED

## (undated) DEVICE — SUT ETHILON 8-0 5" BV130-5

## (undated) DEVICE — SYR LUER LOK 10CC

## (undated) DEVICE — NDL COUNTER FOAM AND MAGNET 40-70

## (undated) DEVICE — TRAP SPECIMEN SPUTUM 40CC

## (undated) DEVICE — TUBING MICROAIRE ASPIRATION SET 12FT

## (undated) DEVICE — PROTECTOR HEEL / ELBOW FLUFFY

## (undated) DEVICE — DRSG TELFA 3 X 8

## (undated) DEVICE — LAP PAD W RING 18 X 18"

## (undated) DEVICE — NDL INJ SCLERO INTERJECT 23G

## (undated) DEVICE — TUBING UTERINE ASPIRATION 3/8" X 6FT W/O ADAPTER

## (undated) DEVICE — SUT MONOCRYL 4-0 27" PS-2 UNDYED

## (undated) DEVICE — LONE STAR ELASTIC STAY HOOK 12MM BLUNT

## (undated) DEVICE — DRAPE U POLY BLUE 60X72"

## (undated) DEVICE — FORCEP RADIAL JAW 4 W NDL 2.4MM 2.8MM 240CM ORANGE DISP

## (undated) DEVICE — SUT HISTOACRYL BLUE

## (undated) DEVICE — DRSG STERISTRIPS 0.5 X 4"

## (undated) DEVICE — DRSG CURITY GAUZE SPONGE 4 X 4" 12-PLY

## (undated) DEVICE — FOLEY TRAY 16FR 5CC LF UMETER CLOSED

## (undated) DEVICE — DRAPE GENERAL ENDOSCOPY

## (undated) DEVICE — CATH ELCTR GLIDE PRB 7FR

## (undated) DEVICE — TUBING SINGLE SPIKE INFILTRTION 15.5 FT

## (undated) DEVICE — PACK MAJOR ABDOMINAL WITH LAP

## (undated) DEVICE — SUT SOFSILK 2-0 18" C-15

## (undated) DEVICE — BLADE SCALPEL SAFETYLOCK #10

## (undated) DEVICE — SURGILUBE HR ONESHOT SAFEWRAP 1.25OZ

## (undated) DEVICE — SPECIMEN TRAP 70ML

## (undated) DEVICE — ELCTR BIPOLAR CORD GYRUS 12FT DISP

## (undated) DEVICE — ELCTR BOVIE TIP BLADE MEGADYNE E-Z CLEAN 6.5" (LONG)

## (undated) DEVICE — BLADE SURGICAL #15 CARBON

## (undated) DEVICE — PACK D&C

## (undated) DEVICE — ELCTR BOVIE TIP BLADE INSULATED 6.5" EDGE

## (undated) DEVICE — TUBING HYDRO-SURG PLUS IRRIGATOR W SMOKEVAC & PROBE

## (undated) DEVICE — FOLEY TRAY 16FR LF URINE METER SURESTEP

## (undated) DEVICE — VALVE BIOPSY

## (undated) DEVICE — CANISTER SUCTION 1200CC 10/SL

## (undated) DEVICE — PIN DISPENSING SPIKE MINI

## (undated) DEVICE — SUT MONOCRYL 4-0 18" PS-2

## (undated) DEVICE — BLADE SURGICAL #10 CARBON

## (undated) DEVICE — SYR ASEPTO

## (undated) DEVICE — SUCTION YANKAUER TAPERED BULBOUS NO VENT

## (undated) DEVICE — GOWN XL

## (undated) DEVICE — BASIN SET DOUBLE

## (undated) DEVICE — SUT QUILL MONODERM 2-0 30CM 26MM

## (undated) DEVICE — DRSG DERMABOND PRINEO 22CM

## (undated) DEVICE — SUT NYLON 2-0 18" FS

## (undated) DEVICE — POLY TRAP ETRAP

## (undated) DEVICE — SOL IRR NS 0.9% 250ML

## (undated) DEVICE — TUBE RECTAL 24FR

## (undated) DEVICE — SNARE LRG

## (undated) DEVICE — VALVE YELLOW PORT SEAL PLUS 5MM

## (undated) DEVICE — DRSG TRACH DRAINAGE 4X4

## (undated) DEVICE — VISITEC 4X4

## (undated) DEVICE — DRSG OPSITE 2.5 X 2"

## (undated) DEVICE — PACK GENERAL MINOR

## (undated) DEVICE — SHEATH SURG GUIDE SCOUT DISP STRL

## (undated) DEVICE — SUT MAXON 0 36" T-12

## (undated) DEVICE — SPONGE PEANUT AUTO COUNT

## (undated) DEVICE — SNARE CAPTIVATOR II RND COLD 10MM

## (undated) DEVICE — PACK MAJOR ABDOMINAL SUPINE

## (undated) DEVICE — FOLEY TRAY 16FR SURESTEP LTX BG

## (undated) DEVICE — SUT SURGIPRO II 0 30" GS-21

## (undated) DEVICE — MEDICATION LABELS W MARKER

## (undated) DEVICE — SYR LUER SLIP TIP 50CC

## (undated) DEVICE — PACK PERI GYN

## (undated) DEVICE — GAMMA SLEEVE DISPOSABLE

## (undated) DEVICE — ELCTR BOVIE TIP BLADE INSULATED 2.8" EDGE WITH SAFETY

## (undated) DEVICE — ELCTR STRYKER NEPTUNE SMOKE EVACUATION PENCIL (GREEN)

## (undated) DEVICE — MARKING PEN W RULER

## (undated) DEVICE — TUBING SUCTION 20FT

## (undated) DEVICE — SUT ETHILON 5-0 18" P-3

## (undated) DEVICE — DRAPE IRRIGATION POUCH 19X23"

## (undated) DEVICE — INVUITY ILLUMINATOR EIGR WAVEGUIDE, WIDE/FLAT DISP

## (undated) DEVICE — CANISTER SUCTION 2000CC

## (undated) DEVICE — SPONGE DISSECTOR PEANUT

## (undated) DEVICE — DRSG STOCKINETTE TUBULAR COTTON 2PLY 6X60"

## (undated) DEVICE — STERIS DEFENDO 3-PIECE KIT (AIR/WATER, SUCTION & BIOPSY VALVES)

## (undated) DEVICE — GLV 6.5 PROTEXIS (BLUE)

## (undated) DEVICE — DRAPE LAPAROTOMY W VELCRO CORD TABS

## (undated) DEVICE — VACUUM CURETTE BERKLEY OLYMPUS STRAIGHT 16MM X 1/2"

## (undated) DEVICE — DRSG BENZOIN 0.6CC

## (undated) DEVICE — DRSG TEGADERM 2.5X3"

## (undated) DEVICE — STAPLER SKIN MULTI DIRECTION W35

## (undated) DEVICE — VACUUM CURETTE BERKLEY OLYMPUS CURVED 9MM

## (undated) DEVICE — PACK GENERAL LAPAROSCOPY

## (undated) DEVICE — TUBING IV SET GRAVITY 3Y 100" MACRO

## (undated) DEVICE — DRAPE LIGHT HANDLE COVER (BLUE)

## (undated) DEVICE — SPEAR SURG EYE WECK-CELL CELOS

## (undated) DEVICE — BRUSH COLONOSCOPY CYTOLOGY

## (undated) DEVICE — ABDOMINAL BINDER MED/LG 12" X 36"-54"

## (undated) DEVICE — GLV 7 PROTEXIS (BLUE)

## (undated) DEVICE — D HELP - CLEARVIEW CLEARIFY SYSTEM

## (undated) DEVICE — SENSOR O2 FINGER XL ADULT 24/BX 6BX/CA

## (undated) DEVICE — SUT HEWSON RETRIEVER

## (undated) DEVICE — PACK MINOR NO DRAPE

## (undated) DEVICE — CATH IV SAFE BC 20G X 1.16" (PINK)

## (undated) DEVICE — SUT QUILL MONODERM 2-0 30CM 18MM

## (undated) DEVICE — SUT VICRYL 4-0 18" PS-2 UNDYED

## (undated) DEVICE — ENDOCUFF VISION SZ 2 LG GRN

## (undated) DEVICE — SOL IRR POUR H2O 250ML

## (undated) DEVICE — VACUUM CURETTE BERKLEY OLYMPUS CURVED 7MM